# Patient Record
Sex: FEMALE | Race: WHITE | NOT HISPANIC OR LATINO | URBAN - METROPOLITAN AREA
[De-identification: names, ages, dates, MRNs, and addresses within clinical notes are randomized per-mention and may not be internally consistent; named-entity substitution may affect disease eponyms.]

---

## 2016-12-31 NOTE — ED PROVIDER NOTE - ALLERGIC/IMMUNOLOGIC NEGATIVE STATEMENT, MLM
no dermatitis, no environmental allergies, no food allergies, no immunosuppressive disorder, and no pruritus.

## 2016-12-31 NOTE — ED PROVIDER NOTE - SECONDARY DIAGNOSIS.
Aspiration pneumonia of both lower lobes, unspecified aspiration pneumonia type Respiratory distress, acute

## 2016-12-31 NOTE — ED PROVIDER NOTE - CHPI ED SYMPTOMS NEG
no chills/no cough/no chest discomfort/no fever/no chest pain/no chest tightness/no diaphoresis/no vomiting/no dizziness

## 2016-12-31 NOTE — ED PROVIDER NOTE - CRITICAL CARE PROVIDED
interpretation of diagnostic studies/consultation with other physicians/direct patient care (not related to procedure)

## 2016-12-31 NOTE — ED PROVIDER NOTE - PMH
Acute congestive heart failure, unspecified congestive heart failure type    BETSY (acute kidney injury)    Coronary bypass graft mechanical complication    Gastroesophageal reflux disease without esophagitis    Hypertension    Insomnia, unspecified type    ST elevation myocardial infarction (STEMI), unspecified artery

## 2016-12-31 NOTE — ED PROVIDER NOTE - NOTES
Case d/w Dr. Conner pt to be admitted to the hospitaist service (Dr. Shine no longer admits this patient)

## 2016-12-31 NOTE — ED ADULT NURSE NOTE - OBJECTIVE STATEMENT
81 yo female BIBA c/o respiratory distress, on oxygen, ems reports pmh caro, md aware. IV RAC #20g.respiratory at bedside and Bipap

## 2016-12-31 NOTE — ED PROVIDER NOTE - MEDICAL DECISION MAKING DETAILS
81yo female with respiratory distress; elevated BP , and hypoxia; placed on BIPAP and tridil drip for APE/decomp CHF; found to have +infiltrates on CXR; will add antibiotics and blood c/s; admission will be required

## 2016-12-31 NOTE — ED PROVIDER NOTE - INTERPRETATION
normal sinus rhythm, Normal axis, Normal CT interval and QRS complex. There are no acute ischemic ST or T-wave changes. normal sinus rhythm

## 2016-12-31 NOTE — ED ADULT NURSE REASSESSMENT NOTE - NS ED NURSE REASSESS COMMENT FT1
pt VS STABLE AFEBRILE CALL PATTERSON IN REACH, SAFETY MAINTAINED PT PLACED ON TELE , pt resting comfortably, denies pain, except for slight ha, pt removed from bipap by rt, pulse ox 95-96% on 3L /nc.resp 20, pt admitted and awaiting a bed assignment.pt aware.

## 2016-12-31 NOTE — ED PROVIDER NOTE - CARE PLAN
Principal Discharge DX:	Acute on chronic congestive heart failure, unspecified congestive heart failure type  Secondary Diagnosis:	Aspiration pneumonia of both lower lobes, unspecified aspiration pneumonia type  Secondary Diagnosis:	Respiratory distress, acute

## 2017-01-01 NOTE — CONSULT NOTE ADULT - SUBJECTIVE AND OBJECTIVE BOX
Chief Complaint:  Patient is a 82y old  Female who presents with a chief complaint of SOB    HPI: Mirtha is a pleasant 82 female with background history of CAD, coronary artery bypass grafting, hypertension, prior heart failure decompensation and lower extremity edema with recent admission for the same improved and was discharged but apparently returned with increasing shortness of breath that was progressively worsening over the last few days. Last evening she had hypertensive urgency as well and was given BiPAP supplemental oxygen, as well as IV nitroglycerin with improvement in her status. She is resting comfortably now and no acute distress with better blood pressures however she is a bit dizzy with background chronic vertigo as well as Gerd symptoms. No current chest pains, PND, palpitations, syncope, lightheadedness, or worsening edema.    Allergies: Talwin    Medications:  Tylenol 325 mg two capsules every six hours as needed PRN. Albuterol nebulizer every four hours as directed, aspirin 81 mg daily, Atorvastatin 20 mg nightly, Colace 100 mg TID, fluoxetine 40 mg daily, meclizine 25 mg TID, Spiriva 18 µg one capsule inhalation daily, zolpidem 5 mg daily as needed for insomnia.    Past medical/surgical history:   CAD, CABG, hypertension, dyslipidemia, chronic vertigo, Gerd, constipation, insomnia.     Family history: Noncontributory to the current situation    Social history: current non-smoker    Review of Systems:    General:  No wt loss, fevers, chills, night sweats  Eyes:  Good vision, no reported pain  ENT:  No sore throat, pain, runny nose, dysphagia  CV:  No pain, palpitations hypo/hypertension  Resp:  No dyspnea, cough, tachypnea, wheezing  GI:  No pain, nausea, vomiting, diarrhea, constipation  :  No pain, bleeding, incontinence, nocturia  Muscle:  No pain, weakness  Breast:  No pain, abscess, mass, discharge  Neuro:  No weakness, tingling, memory problems  Psych:  No fatigue, insomnia, mood problems, depression  Endocrine:  No polyuria, polydipsia cold/heat intolerance  Heme:  No petechiae, ecchymosis, easy bruisability  Skin:  No rash, scars, edema      Physical Exam:      Vital Signs:  Vital Signs Last 24 Hrs  T(C): 36.3, Max: 36.7 (01-01 @ 00:45)  T(F): 97.4, Max: 98 (01-01 @ 00:45)  HR: 78 (68 - 115)  BP: 178/74 (135/77 - 257/137)  RR: 18 (14 - 26)  SpO2: 95% (95% - 100%)  Daily Height in cm: 152.4 (01 Jan 2017 00:45)        General:  Appears stated age, well-groomed, well-nourished, no distress  HEENT:  NC/AT, patent nares w/ pink mucosa, OP clear w/o lesions,EOMI, conjunctivae clear, no thyromegaly, nodules, adenopathy, no JVD  Chest:  Full & symmetric excursion, no increased effort, breath sounds clear  Cardiovascular:  Regular rhythm, S1, S2, no murmur/rub/S3/S4, no carotid bruit, radial/pedal pulses 2+, no edema  Abdomen:  Soft, non-tender, non-distended, normoactive bowel sounds, no HSM  Extremities: No c/c/edema.  Skin:  No rash/erythema. Skin is warm/dry  Musculoskeletal:  Full ROM in all joints w/o swelling/tenderness/effusion  Neuro/Psych:  Alert, oriented    Laboratory:                            13.9   13.0  )-----------( 337      ( 31 Dec 2016 15:30 )             39.0     31 Dec 2016 15:30    139    |  103    |  18     ----------------------------<  150    4.6     |  25     |  0.78     Ca    9.1        31 Dec 2016 15:30    TPro  8.3    /  Alb  4.2    /  TBili  0.5    /  DBili  x      /  AST  19     /  ALT  17     /  AlkPhos  80     31 Dec 2016 15:30    ABG - ( 31 Dec 2016 16:46 )  pH: x     /  pCO2: 43    /  pO2: 340   / HCO3: 24    / Base Excess: -0.5  /  SaO2: 100         CARDIAC MARKERS ( 31 Dec 2016 15:30 )  <.015 ng/mL / x     / 47 U/L / x     / 0.6 ng/mL  BNP 5779    CAPILLARY BLOOD GLUCOSE    LIVER FUNCTIONS - ( 31 Dec 2016 15:30 )  Alb: 4.2 g/dL / Pro: 8.3 gm/dL / ALK PHOS: 80 U/L / ALT: 17 U/L / AST: 19 U/L / GGT: x           PT/INR - ( 31 Dec 2016 15:30 )   PT: 11.1 sec;   INR: 0.99 ratio         PTT - ( 31 Dec 2016 15:30 )  PTT:41.5 sec      Imaging:  CXR:   IMPRESSION: Worsening bibasilar patchy opacities compatible with   pneumonia since 11/16/2016. Small left pleural effusion.    5/2016 echo:     1. Left ventricular ejection fraction, by visual estimation, is 55 to 60%.   2. Moderate mitral annular calcification.   3. Mild aortic regurgitation.   4. Estimated pulmonary artery systolic pressure is 40.3 mmHg assuming a   right atrial pressure of 5 mmHg, which is consistent with mild pulmonary   hypertension.    Assessment:Mirtha is a pleasant 82 female with background history of CAD, coronary artery bypass grafting, hypertension, prior heart failure decompensation and lower extremity edema with recent admission for the same improved and was discharged but apparently returned with increasing shortness of breath that was progressively worsening over the last few days. Last evening she had hypertensive urgency as well and was given BiPAP supplemental oxygen, as well as IV nitroglycerin with improvement in her status. She is resting comfortably now and no acute distress with better blood pressures however she is a bit dizzy with background chronic vertigo as well as Gerd symptoms. No current chest pains, PND, palpitations, syncope, lightheadedness, or worsening edema. Possible PNA and acute on chronic diastolic HF decompensation.      Plan: con't supportive care, abx, fluid/salt restrict. Diuretic prn.  Con't meds:  heparin  Injectable 5000Unit(s) SubCutaneous every 12 hours  lisinopril 20milliGRAM(s) Oral daily  metoprolol 50milliGRAM(s) Oral every 12 hours  meclizine 25milliGRAM(s) Oral three times a day  ALBUTerol    90 MICROgram(s) HFA Inhaler 1Puff(s) Inhalation every 4 hours  tiotropium 18 MICROgram(s) Capsule 1Capsule(s) Inhalation daily  pantoprazole    Tablet 40milliGRAM(s) Oral before breakfast  aspirin enteric coated 81milliGRAM(s) Oral daily  FLUoxetine 40milliGRAM(s) Oral daily  atorvastatin 20milliGRAM(s) Oral at bedtime  docusate sodium 100milliGRAM(s) Oral three times a day    MEDICATIONS  (PRN):  guaiFENesin    Syrup 100milliGRAM(s) Oral every 6 hours PRN Cough  benzocaine 15 mG/menthol 3.6 mG Lozenge 1Lozenge Oral every 3 hours PRN Sore Throat           Kevin Moulton MD, FACC, YOLIS SEVILLA, FACP  Director, Heart Failure Services  [facility]  , Department of Cardiology  [facility]

## 2017-01-02 NOTE — PROGRESS NOTE ADULT - SUBJECTIVE AND OBJECTIVE BOX
Chief Complaint:  Patient is a 82y old  Female who presents with a chief complaint of SOB    HPI: Mirtha is a pleasant 82 female with background history of CAD, coronary artery bypass grafting, hypertension, prior heart failure decompensation and lower extremity edema with recent admission for the same improved and was discharged but apparently returned with increasing shortness of breath that was progressively worsening over the last few days. Last evening she had hypertensive urgency as well and was given BiPAP supplemental oxygen, as well as IV nitroglycerin with improvement in her status. She is resting comfortably now and no acute distress with better blood pressures however she is a bit dizzy with background chronic vertigo as well as Gerd symptoms. No current chest pains, PND, palpitations, syncope, lightheadedness, or worsening edema.    Medications:  Tylenol 325 mg two capsules every six hours as needed PRN. Albuterol nebulizer every four hours as directed, aspirin 81 mg daily, Atorvastatin 20 mg nightly, Colace 100 mg TID, fluoxetine 40 mg daily, meclizine 25 mg TID, Spiriva 18 µg one capsule inhalation daily, zolpidem 5 mg daily as needed for insomnia.      Review of Systems:    General:  No wt loss, fevers, chills, night sweats  Eyes:  Good vision, no reported pain  ENT:  No sore throat, pain, runny nose, dysphagia  CV:  No pain, palpitations hypo/hypertension  Resp:  No dyspnea, cough, tachypnea, wheezing  GI:  No pain, nausea, vomiting, diarrhea, constipation  :  No pain, bleeding, incontinence, nocturia  Muscle:  No pain, weakness  Breast:  No pain, abscess, mass, discharge  Neuro:  No weakness, tingling, memory problems  Psych:  No fatigue, insomnia, mood problems, depression  Endocrine:  No polyuria, polydipsia cold/heat intolerance  Heme:  No petechiae, ecchymosis, easy bruisability  Skin:  No rash, scars, edema      Physical Exam:    Vital Signs Last 24 Hrs  T(C): 36.9, Max: 36.9 (01-02 @ 11:42)  T(F): 98.4, Max: 98.4 (01-02 @ 11:42)  HR: 66 (63 - 75)  BP: 159/89 (159/89 - 185/66)  RR: 18 (18 - 20)  SpO2: 97% (95% - 99%)      General:  Appears stated age, well-groomed, well-nourished, no distress  HEENT:  NC/AT, patent nares w/ pink mucosa, OP clear w/o lesions,EOMI, conjunctivae clear, no thyromegaly, nodules, adenopathy, no JVD  Chest:  Full & symmetric excursion, no increased effort, breath sounds clear  Cardiovascular:  Regular rhythm, S1, S2, no murmur/rub/S3/S4, no carotid bruit, radial/pedal pulses 2+, no edema  Abdomen:  Soft, non-tender, non-distended, normoactive bowel sounds, no HSM  Extremities: No c/c/edema.  Skin:  No rash/erythema. Skin is warm/dry  Musculoskeletal:  Full ROM in all joints w/o swelling/tenderness/effusion  Neuro/Psych:  Alert, oriented    Laboratory:    No new labs today.    Imaging:  CXR:   IMPRESSION: Worsening bibasilar patchy opacities compatible with   pneumonia since 11/16/2016. Small left pleural effusion.    5/2016 echo:     1. Left ventricular ejection fraction, by visual estimation, is 55 to 60%.   2. Moderate mitral annular calcification.   3. Mild aortic regurgitation.   4. Estimated pulmonary artery systolic pressure is 40.3 mmHg assuming a   right atrial pressure of 5 mmHg, which is consistent with mild pulmonary   hypertension.    Assessment:Mirtha is a pleasant 82 female with background history of CAD, coronary artery bypass grafting, hypertension, prior heart failure decompensation and lower extremity edema with recent admission for the same improved and was discharged but apparently returned with increasing shortness of breath that was progressively worsening over the last few days. Last evening she had hypertensive urgency as well and was given BiPAP supplemental oxygen, as well as IV nitroglycerin with improvement in her status. She is resting comfortably now and no acute distress with better blood pressures however she is a bit dizzy with background chronic vertigo as well as Gerd symptoms. No current chest pains, PND, palpitations, syncope, lightheadedness, or worsening edema. Possible PNA and acute on chronic diastolic HF decompensation.      Plan: con't supportive care, abx, fluid/salt restrict. Diuretic prn.  BP management, DVT protection, lipid lowering.    heparin  Injectable 5000Unit(s) SubCutaneous every 12 hours  lisinopril 20milliGRAM(s) Oral daily  metoprolol 50milliGRAM(s) Oral every 12 hours  meclizine 25milliGRAM(s) Oral three times a day  ALBUTerol    90 MICROgram(s) HFA Inhaler 1Puff(s) Inhalation every 4 hours  tiotropium 18 MICROgram(s) Capsule 1Capsule(s) Inhalation daily  pantoprazole    Tablet 40milliGRAM(s) Oral before breakfast  aspirin enteric coated 81milliGRAM(s) Oral daily  FLUoxetine 40milliGRAM(s) Oral daily  atorvastatin 20milliGRAM(s) Oral at bedtime  docusate sodium 100milliGRAM(s) Oral three times a day    MEDICATIONS  (PRN):  guaiFENesin    Syrup 100milliGRAM(s) Oral every 6 hours PRN Cough  benzocaine 15 mG/menthol 3.6 mG Lozenge 1Lozenge Oral every 3 hours PRN Sore Throat  sodium chloride 0.65% Nasal 1Spray(s) Both Nostrils two times a day PRN Nasal Congestion             Kevin Moulton MD, FACC, FASSWATI, FASNC, FACP  Director, Heart Failure Services  [facility]  , Department of Cardiology  [facility]

## 2017-01-02 NOTE — DIETITIAN INITIAL EVALUATION ADULT. - ENERGY NEEDS
Height (cm): 152.4 (01-01)  Weight (kg): 62.2 (01-01)  BMI (kg/m2): 26.8 (01-01)  IBW: 100 lbs/ 45.3 kg % IBW: 152% UBW: 134lbs/ 60.7 kg %UBW: 113%, ? adm wt. for accuracy, pt. appears WNL of IBW/ DBW

## 2017-01-02 NOTE — DIETITIAN INITIAL EVALUATION ADULT. - NS AS NUTRI INTERV ED CONTENT
Educate on Dysphagia 3 Soft - Thin Liquids @ present & recommendation for swallow evaluation to assess appropriate consistency of diet/Purpose of the nutrition education

## 2017-01-02 NOTE — DIETITIAN INITIAL EVALUATION ADULT. - FACTORS AFF FOOD INTAKE
difficulty chewing/difficulty swallowing/pt. reports poor teeth condition, missing teeth, broken bridge/loose tooth,  coughing c food intake which is alleviated by drinking water, 1/1. dysphagia screen ordered

## 2017-01-02 NOTE — DIETITIAN INITIAL EVALUATION ADULT. - OTHER INFO
Pt. was seen due to dx of CHF.  Pt. stated that she has not been eating right during the Holidays & was consuming salty foods ie, cheese, pepperoni & canned soups.  Pt. reports hx of lactose intolerance c symptoms of gas & vomiting.  Pt. was using lactacid milk @ home.  Pt. reports that she consumed 1/2 of breakfast this AM, able to eat soft, moist foods.

## 2017-01-02 NOTE — DIETITIAN INITIAL EVALUATION ADULT. - NS AS NUTRI INTERV MEALS SNACK
Texture-modified diet/Recommend Dysphagia 3 Soft - Thin Liquids, Low sodium diet/Mineral - modified diet

## 2017-01-02 NOTE — DIETITIAN INITIAL EVALUATION ADULT. - NUTRITION INTERVENTION
Meals and Snack/Nutrition Education/Collaboration and Referral of Nutrition Care Nutrition Education

## 2017-01-02 NOTE — DIETITIAN INITIAL EVALUATION ADULT. - PHYSICAL APPEARANCE
other (specify)/BMI=26.8, overweight 1/1/17)? for accuracy, pt. does  not appear overweight,  pt. reports last weight @ home was 134 lbs 3 weeks ago.

## 2017-01-03 NOTE — PROGRESS NOTE ADULT - ASSESSMENT
81yo female with background history of CAD, coronary artery bypass grafting, hypertension, prior heart failure decompensation and lower extremity edema with recent admission for the same improved and was discharged but apparently returned with increasing shortness of breath that was progressively worsening over the last few days. Admitted with hypertensive urgency and patient remains hypertensive. She is resting comfortably now and no acute distress however she is a bit dizzy with background chronic vertigo as well as Gerd symptoms. Possible PNA and acute on chronic diastolic HF decompensation.

## 2017-01-03 NOTE — PROGRESS NOTE ADULT - SUBJECTIVE AND OBJECTIVE BOX
83yo female who presents with a chief complaint of SOB.    HPI: Known history of CAD, coronary artery bypass grafting, hypertension, prior heart failure decompensation and lower extremity edema with recent admission for the same, improved and was discharged but apparently returned with increasing shortness of breath over the last few days. Patient also has complaints of headaches and chronic dizziness which she attributes to vertigo. She was noted to have hypertensive urgency as well She is resting comfortably now and no acute distress with better blood pressures. She still complains of significant dizziness and is most disturbed over the fact that she did not receive her sleeping pill last night in a timely fashion. No current chest pains, PND, palpitations, syncope, lightheadedness, or worsening edema.    Allergies: Talwin    Medications: Tylenol 325 mg two capsules every six hours as needed PRN. Albuterol nebulizer every four hours as directed, aspirin 81 mg daily, Atorvastatin 20 mg nightly, Colace 100 mg TID, fluoxetine 40 mg daily, meclizine 25 mg TID, Spiriva 18 µg one capsule inhalation daily, zolpidem 5 mg daily as needed for insomnia.  Past medical/surgical history: CABG, hypertension, dyslipidemia, chronic vertigo, Gerd, constipation, insomnia.   Family history: Noncontributory to the current situation  Social history: current non-smoker    Review of Systems:    General:  No wt loss, fevers, chills, night sweats  Eyes:  Good vision, no reported pain  ENT:  No sore throat, pain, runny nose, dysphagia  CV:  No pain, palpitations hypo/hypertension  Resp:  No dyspnea, cough, tachypnea, wheezing  GI:  No pain, nausea, vomiting, diarrhea, constipation  :  No pain, bleeding, incontinence, nocturia  Muscle:  No pain, weakness  Breast:  No pain, abscess, mass, discharge  Neuro:  Migraine headaches and dizziness  Psych:  Anxious. Complains of insomnia.  Endocrine:  No polyuria, polydipsia cold/heat intolerance  Heme:  No petechiae, ecchymosis, easy bruisability  Skin:  No rash, scars, edema    PHYSICAL EXAMINATION:  -----------------------------  T(C): 36.6, Max: 36.9 (- @ 11:42)  HR: 59 (59 - 66)  BP: 181/73 (159/89 - 192/73)  RR: 18 (17 - 18)  SpO2: 97% (97% - 98%)  Wt(kg): --    I & Os for current day (as of  @ 10:05)  =============================================  IN:    Oral Fluid: 120 ml    Total IN: 120 ml  ---------------------------------------------  OUT:    Total OUT: 0 ml  ---------------------------------------------  Total NET: 120 ml        Constitutional: well developed, normal appearance, well groomed, well nourished, no deformities and no acute distress.   Eyes: the conjunctiva exhibited no abnormalities and the eyelids demonstrated no xanthelasmas.   HEENT: normal oral mucosa, no oral pallor and no oral cyanosis.   Neck: normal jugular venous A waves present, normal jugular venous V waves present and no jugular venous martinez A waves.   Pulmonary: no respiratory distress, normal respiratory rhythm and effort, no accessory muscle use and lungs were clear to auscultation bilaterally.   Cardiovascular: heart rate and rhythm were normal, normal S1 and S2 and no murmur, gallop, rub, heave or thrill are present.   Abdomen: soft, non-tender, no hepato-splenomegaly and no abdominal mass palpated.   Musculoskeletal: the gait could not be assessed..   Extremities: no clubbing of the fingernails, no localized cyanosis, no petechial hemorrhages and no ischemic changes.   Skin: normal skin color and pigmentation, no rash, no venous stasis, no skin lesions, no skin ulcer and no xanthoma was observed.   Psychiatric: oriented to person, place, and time, the affect was normal, the mood was normal and not feeling anxious.     LABS:   --------  2017 07:21    136    |  97     |  14     ----------------------------<  96     3.6     |  30     |  0.77     Ca    8.8        2017 07:21    TPro  6.6    /  Alb  3.4    /  TBili  0.4    /  DBili  x      /  AST  18     /  ALT  15     /  AlkPhos  61     2017 07:21                         11.4   6.4   )-----------( 230      ( 2017 07:21 )             32.8                 Imaging:    EXAM:  CHEST SINGLE VIEW                            PROCEDURE DATE:  2016        INTERPRETATION:  Single AP view of the chest.    CLINICAL INFORMATION: Chest pain, shortness of breath    FINDINGS:  There is a small left pleural effusion as well as bibasilar   patchy opacities. Heart size cannot be accurately evaluated in this   projection. The patient is status post sternotomy. 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    IMPRESSION: Worsening bibasilar patchy opacities compatible with   pneumonia since 2016. Small left pleural effusion.             EXAM:  TTE WO CON COMPLETE W DOPPL      PROCEDURE DATE:  May 27 2016   .      INTERPRETATION:  Sonographer:  INDIA     Indications: I50.33 Acute on chronic diastolic (congestive) heart failure  Diagnosis:   CHF           2D AND M-MODE MEASUREMENTS (normal ranges within parentheses):  Left Ventricle:                 Normal    Aorta/Left Atrium:           Normal  IVSd (2D):              1.21 cm (0.7-1.1) Aortic Root (2D):  2.99 cm   (2.4-3.7)  LVPWd (2D):             1.10 cm (0.7-1.1) Left Atrium (2D):  3.01 cm   (1.9-4.0)  LVIDd (2D):             4.47 cm (3.4-5.7) Right Ventricle:  LVIDs (2D):             2.74 cm           TAPSE:           1.53 cm  LV FS (2D):             38.8 %  (>25%)  Relative Wall Thickness 0.49    (<0.42)     LV DIASTOLICFUNCTION:  MV Peak E: 1.09 m/s Decel Time: 235 msec  MV Peak A: 0.87 m/s  E/A Ratio: 1.25     SPECTRAL DOPPLER ANALYSIS (where applicable):  Mitral Valve:  MV P1/2 Time: 68.23 msec  MV Area, PHT: 3.22 cm²     Aortic Valve: AoV Max Mina: 1.63 m/s AoV Peak PG: 10.6 mmHg AoV Mean P.9 mmHg     LVOT Vmax: 1.17 m/s LVOT VTI: 0.272 m LVOT Diameter:     Aortic Insufficiency:  AI Half-time:  670 msec  AI Decel Rate: 1.46 m/s²     Tricuspid Valve and PA/RV Systolic Pressure: TR Max Velocity: 2.97 m/s   RA Pressure: 5 mmHg RVSP/PASP: 40.3 mmHg        PHYSICIAN INTERPRETATION:  Left Ventricle: Normal left ventricular size and wall thicknesses, with   normal systolic and diastolic function.  Left ventricular ejection fraction, by visual estimation, is 55 to 60%.  Right Ventricle: Normal right ventricular size and function.  Left Atrium: The left atrium is normal in size. Normal left atrial size.  Right Atrium: The right atrium is normal in size.  Pericardium: There is no evidence of pericardial effusion.  Mitral Valve: Structurally normal mitral valve, with normal leaflet   excursion. There is moderate mitral annular calcification. Mild mitral   valve regurgitation is seen.  Tricuspid Valve: Structurally normal tricuspid valve, with normal leaflet   excursion. The tricuspid valve is normal in structure. Mild tricuspid   regurgitation is visualized. Estimated pulmonary artery systolic pressure   is 40.3 mmHg assuming a right atrial pressure of 5 mmHg, which is   consistent with mild pulmonary hypertension.  Aortic Valve: Normal trileaflet aortic valve with normal opening. Mild   aortic valve regurgitation is seen.  Pulmonic Valve: Structurally normal pulmonic valve, with normal leaflet   excursion.  Aorta: The aortic root and ascendingaorta are structurally normal, with   no evidence of dilitation.  Pulmonary Artery: The main pulmonary artery is normal in size.     IMPRESSION:  Summary:   1. Left ventricular ejection fraction, by visual estimation, is 55 to   60%.   2. Moderate mitral annular calcification.   3. Mild aortic regurgitation.   4. Estimated pulmonary artery systolic pressure is 40.3 mmHg assuming a   right atrial pressure of 5 mmHg, which is consistent with mild pulmonary   hypertension.

## 2017-01-03 NOTE — CONSULT NOTE ADULT - SUBJECTIVE AND OBJECTIVE BOX
Infectious Diseases - Attending at Dr. Issa    HPI:  76 y/o M with PMH of CHF, h/o cardiac arrythmia VT, s/p AICD, h/o stroke on aggrenox, HTN, TRISTA on CPAP at night and DM2 p/w c/o worsening sob, retrosternal CP since 7 PM evening before admission. She was experiencing worsening cough (dry ) and shortness of breath since 5 days prior to admission and thought she was over exhausting herself over holidays.She denies any fever, chills etc  Patient also gives h/o worsening leg edema for last few days.  In ED he received IV lasix and morphine, now CP is resolved and SOB improved. Lying comfortably on bed and saturating well on nasal cannula.  patient states to be complaint to his medications , but drinks plenty of water daily.  Her symptoms have been improving in the hospital.  She had leukocytosis in the hospital which has resolved. She received a dose of vanco and zosyn in the hospital on admission.      PAST MEDICAL & SURGICAL HISTORY:  Coronary bypass graft mechanical complication  ST elevation myocardial infarction (STEMI), unspecified artery  BETSY (acute kidney injury)  Insomnia, unspecified type  Gastroesophageal reflux disease without esophagitis  Acute congestive heart failure, unspecified congestive heart failure type  Hypertension  S/P CABG x 1: 9 days ago  S/P TKR (total knee replacement) using cement, bilateral      Allergies    Talwin (Nausea)    Intolerances    lactose (Headache; Vomiting)  Milk (Vomiting)      FAMILY HISTORY:  No pertinent family history in first degree relatives      SOCIAL HISTORY:    REVIEW OF SYSTEMS:    Constitutional: No fever, weight loss or fatigue  Eyes: No eye pain, visual disturbances, or discharge  ENT: headache, also has history of migraine  Neck: No pain or stiffness  Breasts: No pain, masses or nipple discharge  Respiratory: cough with shortness of breath and chest pain  Cardiovascular: No chest pain, palpitations, shortness of breath, dizziness or leg swelling  Gastrointestinal: No abdominal or epigastric pain. No nausea, vomiting or hematemesis; No diarrhea or constipation. No melena or hematochezia.  Genitourinary: No dysuria, frequency, hematuria or incontinence  Rectal: No pain, hemorrhoids or incontinence  Neurological: No headaches, memory loss, loss of strength, numbness or tremors  Skin: No itching, burning, rashes or lesions   Lymph Nodes: No enlarged glands  Endocrine: No heat or cold intolerance; No hair loss  Musculoskeletal: No joint pain or swelling; No muscle, back or extremity pain  Psychiatric: No depression, anxiety, mood swings or difficulty sleeping  Heme/Lymph: No easy bruising or bleeding gums  Allergy and Immunologic: No hives or eczema    MEDICATIONS  (STANDING):  heparin  Injectable 5000Unit(s) SubCutaneous every 12 hours  metoprolol 50milliGRAM(s) Oral every 12 hours  meclizine 25milliGRAM(s) Oral three times a day  ALBUTerol    90 MICROgram(s) HFA Inhaler 1Puff(s) Inhalation every 4 hours  tiotropium 18 MICROgram(s) Capsule 1Capsule(s) Inhalation daily  pantoprazole    Tablet 40milliGRAM(s) Oral before breakfast  aspirin enteric coated 81milliGRAM(s) Oral daily  FLUoxetine 40milliGRAM(s) Oral daily  atorvastatin 20milliGRAM(s) Oral at bedtime  docusate sodium 100milliGRAM(s) Oral three times a day  lisinopril 40milliGRAM(s) Oral daily  furosemide   Injectable 40milliGRAM(s) IV Push every 12 hours    MEDICATIONS  (PRN):  guaiFENesin    Syrup 100milliGRAM(s) Oral every 6 hours PRN Cough  benzocaine 15 mG/menthol 3.6 mG Lozenge 1Lozenge Oral every 3 hours PRN Sore Throat  sodium chloride 0.65% Nasal 1Spray(s) Both Nostrils two times a day PRN Nasal Congestion  acetaminophen   Tablet. 650milliGRAM(s) Oral every 6 hours PRN Moderate Pain (4 - 6)  melatonin 3milliGRAM(s) Oral at bedtime PRN Insomnia  zolpidem 5milliGRAM(s) Oral at bedtime PRN Insomnia  benzonatate 100milliGRAM(s) Oral three times a day PRN Cough      Vital Signs Last 24 Hrs  T(C): 36.2, Max: 36.9 (01-02 @ 23:49)  T(F): 97.2, Max: 98.4 (01-02 @ 23:49)  HR: 55 (55 - 65)  BP: 163/59 (163/59 - 192/73)  BP(mean): --  RR: 16 (16 - 18)  SpO2: 96% (96% - 98%)    PHYSICAL EXAM:    Constitutional: NAD, well-groomed, well-developed  HEENT: PERRLA, EOMI, Normal Hearing, MMM  Neck: No LAD, No JVD  Respiratory: decreased air entry in all lung fields.  Cardiovascular: S1 and S2, RRR, no M/G/R  Gastrointestinal: BS+, soft, NT/ND  Extremities: No peripheral edema  Vascular: 2+ peripheral pulses  Neurological: A/O x 3, no focal deficits  Psychiatric: Normal mood, normal affect  Musculoskeletal: 5/5 strength b/l upper and lower extremities  Skin: No rashes      LABS:                        11.4   6.4   )-----------( 230      ( 03 Jan 2017 07:21 )             32.8     03 Jan 2017 07:21    136    |  97     |  14     ----------------------------<  96     3.6     |  30     |  0.77     Ca    8.8        03 Jan 2017 07:21    TPro  6.6    /  Alb  3.4    /  TBili  0.4    /  DBili  x      /  AST  18     /  ALT  15     /  AlkPhos  61     03 Jan 2017 07:21          MICROBIOLOGY:      RADIOLOGY & ADDITIONAL STUDIES:

## 2017-01-03 NOTE — CONSULT NOTE ADULT - PROBLEM SELECTOR RECOMMENDATION 2
on lasix,cxr changes not much changed then last admission and Pr BNP elevated with leg swelling on presentation.  cardio on board

## 2017-01-03 NOTE — H&P ADULT. - HISTORY OF PRESENT ILLNESS
HPI written today because admitting md did not write and history of present illness and was not Follow up by hospitalist since admission   76 y/o M with PMH of CHF, h/o cardiac arrythmia VT, s/p AICD, h/o stroke on aggrenox, HTN, TRISTA on CPAP at night and DM2 p/w c/o worsening sob, retrosternal CP since 7 PM last evening.  Patient also gives h/o worsening leg edema for last few days, he denies cough, fever, chills, orthopnea, perspiration, nausea, vomiting.  In ED he received IV lasix and morphine, now CP is resolved and SOB improved. lying comfortably on bed and saturating well on nasal cannula.  patient states to be complaint to his medications , but drinks plenty of water daily.  patient still uses bipap

## 2017-01-03 NOTE — CONSULT NOTE ADULT - PROBLEM SELECTOR RECOMMENDATION 9
symptoms imrpoving  At this point I really doubt pneumonia. Monitoring off antibiotics  check PCT level  Pennysealejo parker for cough PRN

## 2017-01-04 NOTE — PHYSICAL THERAPY INITIAL EVALUATION ADULT - MODIFIED CLINICAL TEST OF SENSORY INTEGRATION IN BALANCE TEST
Barthel Index: Feeding Score _10__, Bathing Score __5_, Grooming Score _5__, Dressing Score ___10, Bowels Score _10__, Bladder Score __10_, Toilet Score _10__, Transfers Score __15_, Mobility Score _15__, Stairs Score __0_,     Total Score __90_

## 2017-01-04 NOTE — PROGRESS NOTE ADULT - SUBJECTIVE AND OBJECTIVE BOX
Patient is a 82y old  Female who presents with a chief complaint of short of breath (03 Jan 2017 11:40)  HPI: Known history of CAD, coronary artery bypass grafting, hypertension, prior heart failure decompensation and lower extremity edema with recent admission for the same, improved and was discharged but apparently returned with increasing shortness of breath over the last few days. Patient also has complaints of headaches and chronic dizziness which she attributes to vertigo. She was noted to have hypertensive urgency as well She is resting comfortably now and no acute distress with better blood pressures. She still complains of significant dizziness and is most disturbed over the fact that she did not receive her sleeping pill last night in a timely fashion. No current chest pains, PND, palpitations, syncope, lightheadedness, or worsening edema      PAST MEDICAL & SURGICAL HISTORY:  Coronary bypass graft mechanical complication  ST elevation myocardial infarction (STEMI), unspecified artery  BETSY (acute kidney injury)  Insomnia, unspecified type  Gastroesophageal reflux disease without esophagitis  Acute congestive heart failure, unspecified congestive heart failure type  Hypertension  S/P CABG x 1: 9 days ago  S/P TKR (total knee replacement) using cement, bilateral      INTERVAL HISTORY: Pt. was able to ambulate length of hallway with physical therapy. O2 Sat after back in bed on room air 93%, mild DILLARD.  Mostly concerned about insomnia.  	  MEDICATIONS:  MEDICATIONS  (STANDING):  heparin  Injectable 5000Unit(s) SubCutaneous every 12 hours  metoprolol 50milliGRAM(s) Oral every 12 hours  meclizine 25milliGRAM(s) Oral three times a day  ALBUTerol    90 MICROgram(s) HFA Inhaler 1Puff(s) Inhalation every 4 hours  tiotropium 18 MICROgram(s) Capsule 1Capsule(s) Inhalation daily  pantoprazole    Tablet 40milliGRAM(s) Oral before breakfast  aspirin enteric coated 81milliGRAM(s) Oral daily  FLUoxetine 40milliGRAM(s) Oral daily  atorvastatin 20milliGRAM(s) Oral at bedtime  docusate sodium 100milliGRAM(s) Oral three times a day  lisinopril 40milliGRAM(s) Oral daily  furosemide   Injectable 40milliGRAM(s) IV Push every 12 hours    MEDICATIONS  (PRN):  guaiFENesin    Syrup 100milliGRAM(s) Oral every 6 hours PRN Cough  benzocaine 15 mG/menthol 3.6 mG Lozenge 1Lozenge Oral every 3 hours PRN Sore Throat  sodium chloride 0.65% Nasal 1Spray(s) Both Nostrils two times a day PRN Nasal Congestion  acetaminophen   Tablet. 650milliGRAM(s) Oral every 6 hours PRN Moderate Pain (4 - 6)  melatonin 3milliGRAM(s) Oral at bedtime PRN Insomnia  zolpidem 5milliGRAM(s) Oral at bedtime PRN Insomnia  benzonatate 100milliGRAM(s) Oral three times a day PRN Cough      Vitals:  T(F): 98.2, Max: 99.8 (01-03 @ 17:05)  HR: 61 (55 - 66)  BP: 190/85 (161/72 - 196/89)  RR: 17 (16 - 17)  SpO2: 100% (94% - 100%)  Wt(kg): --    I & Os for current day (as of 01-04 @ 12:21)  =============================================  IN:    Oral Fluid: 80 ml    Total IN: 80 ml  ---------------------------------------------  OUT:    Total OUT: 0 ml  ---------------------------------------------  Total NET: 80 ml        PHYSICAL EXAM:  Neuro: Awake, responsive  CV: S1 S2 RRR, soft syst murmur  Lungs: CTA anteriorly, dim bibas  GI: Soft, BS +, ND, NT  Extremities: less LE edema, now trace    TELEMETRY: NSR, occ PVC  	    RADIOLOGY:     CHEST SINGLE VIEW                            PROCEDURE DATE:  12/31/2016      IMPRESSION: Worsening bibasilar patchy opacities compatible with   pneumonia since 11/16/2016. Small left pleural effusion.	                      12.7   7.0   )-----------( 231      ( 04 Jan 2017 07:26 )             36.3 ,                       11.4   6.4   )-----------( 230      ( 03 Jan 2017 07:21 )             32.8   04 Jan 2017 07:26    135    |  97     |  16     ----------------------------<  98     3.5     |  30     |  0.72     Ca    9.0        04 Jan 2017 07:26  Mg     1.7       04 Jan 2017 07:26    TPro  6.6    /  Alb  3.4    /  TBili  0.4    /  DBili  x      /  AST  18     /  ALT  15     /  AlkPhos  61     03 Jan 2017 07:21

## 2017-01-04 NOTE — PHYSICAL THERAPY INITIAL EVALUATION ADULT - CRITERIA FOR SKILLED THERAPEUTIC INTERVENTIONS
anticipated discharge recommendation/Home. DC'd from PT secondary to being at baseline and prior level of function.

## 2017-01-04 NOTE — PROGRESS NOTE ADULT - PROBLEM SELECTOR PROBLEM 3
Gastroesophageal reflux disease without esophagitis
Respiratory distress, acute
Respiratory distress, acute

## 2017-01-04 NOTE — PROGRESS NOTE ADULT - PROBLEM SELECTOR PLAN 4
continue current meds
Continue zolpidem as needed and consider anti-anxiolytics as well.
Continue zolpidem as needed and consider anti-anxiolytics as well.

## 2017-01-04 NOTE — PHYSICAL THERAPY INITIAL EVALUATION ADULT - GENERAL OBSERVATIONS, REHAB EVAL
Pt seen supine in bed, alert and Ox4, NAD, nasal cannula intact, cardiac monitor intact. Pt /85, HR 60, O2 99% on 3L.

## 2017-01-04 NOTE — PROGRESS NOTE ADULT - ASSESSMENT
81yo female with background history of CAD, coronary artery bypass grafting, hypertension, prior heart failure decompensation and lower extremity edema with recent admission for the same improved and was discharged but apparently returned with increasing shortness of breath that was progressively worsening over the last few days. Admitted with hypertensive urgency and patient remains hypertensive. Possible PNA and acute on chronic diastolic HF decompensation. She is diuresing well. Now off ABX, afebrile.

## 2017-01-04 NOTE — PROGRESS NOTE ADULT - SUBJECTIVE AND OBJECTIVE BOX
Patient is a 82y old  Female who presents with a chief complaint of short of breath (03 Jan 2017 11:40)      INTERVAL HPI / OVERNIGHT EVENTS: pt c/o palpitations and on and off dyspnea for a long time. She is concerned if she has lupus which was told to her by a physician 3-4 yrs ago. Her cough is better today and c/o of only     MEDICATIONS  (STANDING):  heparin  Injectable 5000Unit(s) Sub Cutaneous every 12 hours.  metoprolol 50milliGRAM(s) Oral every 12 hours  meclizine 25milliGRAM(s) Oral three times a day  ALBUTerol    90 MICROgram(s) HFA Inhaler 1Puff(s) Inhalation every 4 hours  tiotropium 18 MICROgram(s) Capsule 1Capsule(s) Inhalation daily  pantoprazole    Tablet 40milliGRAM(s) Oral before breakfast  aspirin enteric coated 81milliGRAM(s) Oral daily  FLUoxetine 40milliGRAM(s) Oral daily  atorvastatin 20milliGRAM(s) Oral at bedtime  docusate sodium 100milliGRAM(s) Oral three times a day  lisinopril 40milliGRAM(s) Oral daily  furosemide   Injectable 40milliGRAM(s) IV Push every 12 hours    MEDICATIONS  (PRN):  guaiFENesin    Syrup 100milliGRAM(s) Oral every 6 hours PRN Cough  benzocaine 15 mG/menthol 3.6 mG Lozenge 1Lozenge Oral every 3 hours PRN Sore Throat  sodium chloride 0.65% Nasal 1Spray(s) Both Nostrils two times a day PRN Nasal Congestion  acetaminophen   Tablet. 650milliGRAM(s) Oral every 6 hours PRN Moderate Pain (4 - 6)  melatonin 3milliGRAM(s) Oral at bedtime PRN Insomnia  zolpidem 5milliGRAM(s) Oral at bedtime PRN Insomnia  benzonatate 100milliGRAM(s) Oral three times a day PRN Cough      Vital Signs Last 24 Hrs  T(C): 36.8, Max: 37.7 (01-03 @ 17:05)  T(F): 98.2, Max: 99.8 (01-03 @ 17:05)  HR: 61 (55 - 66)  BP: 190/85 (161/72 - 196/89)  BP(mean): --  RR: 17 (16 - 17)  SpO2: 100% (94% - 100%)    PHYSICAL EXAM:    Constitutional: NAD, well-groomed, well-developed  Respiratory: CTAB/L  Cardiovascular: S1 and S2, RRR, no M/G/R  Gastrointestinal: BS+, soft, NT/ND  Extremities: No peripheral edema  Vascular: 2+ peripheral pulses  Skin: No rashes      LABS:  Procalcitonin <0.05                        12.7   7.0   )-----------( 231      ( 04 Jan 2017 07:26 )             36.3     04 Jan 2017 07:26    135    |  97     |  16     ----------------------------<  98     3.5     |  30     |  0.72     Ca    9.0        04 Jan 2017 07:26  Mg     1.7       04 Jan 2017 07:26    TPro  6.6    /  Alb  3.4    /  TBili  0.4    /  DBili  x      /  AST  18     /  ALT  15     /  AlkPhos  61     03 Jan 2017 07:21            MICROBIOLOGY:    Culture Results -   No growth to date.  Gram Stain - --  Gram Stain Blood - --  Organism - --  Organism Identification - --  Specimen Source - .Blood Blood-Venous            Culture Results -   No growth to date.  Method Type - --  Organism Identification - --  Specimen Source - .Blood Blood-Venous              AFB:        RADIOLOGY & ADDITIONAL STUDIES:

## 2017-01-04 NOTE — PROGRESS NOTE ADULT - PROBLEM SELECTOR PLAN 3
protonix
Possibly due to acute on chronic diastolic heart failure but would consider concurrent respiratory infection as well.
Possibly due to acute on chronic diastolic heart failure but would consider concurrent respiratory infection as well. Now off ABX. Will check RA O2 Sat. DC oxygen if Sat >93%

## 2017-01-04 NOTE — PROGRESS NOTE ADULT - PROBLEM SELECTOR PROBLEM 2
Acute on chronic diastolic (congestive) heart failure
Insomnia, unspecified type
Essential hypertension
Essential hypertension

## 2017-01-04 NOTE — PROGRESS NOTE ADULT - SUBJECTIVE AND OBJECTIVE BOX
CHIEF COMPLAINT/INTERVAL HISTORY:    Patient is a 82y old  Female who presents with a chief complaint of short of breath (03 Jan 2017 11:40)      HPI:  HPI written today because admitting md did not write and history of present illness and was not Follow up by hospitalist since admission   74 y/o M with PMH of CHF, h/o cardiac arrythmia VT, s/p AICD, h/o stroke on aggrenox, HTN, TRISTA on CPAP at night and DM2 p/w c/o worsening sob, retrosternal CP since 7 PM last evening.  Patient also gives h/o worsening leg edema for last few days, he denies cough, fever, chills, orthopnea, perspiration, nausea, vomiting.  In ED he received IV lasix and morphine, now CP is resolved and SOB improved. lying comfortably on bed and saturating well on nasal cannula.  patient states to be complaint to his medications , but drinks plenty of water daily.  patient still uses bipap (03 Jan 2017 11:40)    Overnight issues  still complain of dyspnea upon exertion   switched by cardiology to oral lasix     SUBJECTIVE & OBJECTIVE: Pt seen and examined at bedside.   ROS:  CONSTITUTIONAL: No fever, weight loss, or fatigue  EYES: No eye pain, visual disturbances, or discharge  ENMT:  No difficulty hearing, tinnitus, vertigo; No sinus or throat pain  NECK: No pain or stiffness  RESPIRATORY: No cough, wheezing, chills or hemoptysis; No shortness of breath  CARDIOVASCULAR: No chest pain, palpitations, dizziness, or leg swelling POSITIVE dyspnea upon exertion   GASTROINTESTINAL: No abdominal or epigastric pain. No nausea, vomiting, or hematemesis; No diarrhea or constipation. No melena or hematochezia.  GENITOURINARY: No dysuria, frequency, hematuria, or incontinence  NEUROLOGICAL: No headaches, memory loss, loss of strength, numbness, or tremors  SKIN: No itching, burning, rashes, or lesions   LYMPH NODES: No enlarged glands  ENDOCRINE: No heat or cold intolerance; No hair loss  MUSCULOSKELETAL: No joint pain or swelling; No muscle, back, or extremity pain  PSYCHIATRIC: No depression, anxiety, mood swings, or difficulty sleeping  HEME/LYMPH: No easy bruising, or bleeding gums  ALLERGY AND IMMUNOLOGIC: No hives or eczema  ICU Vital Signs Last 24 Hrs  T(C): 36.6, Max: 37.7 (01-03 @ 17:05)  T(F): 97.8, Max: 99.8 (01-03 @ 17:05)  HR: 62 (61 - 66)  BP: 189/66 (161/72 - 196/89)  BP(mean): --  ABP: --  ABP(mean): --  RR: 16 (16 - 17)  SpO2: 95% (94% - 100%)        MEDICATIONS  (STANDING):  heparin  Injectable 5000Unit(s) SubCutaneous every 12 hours  metoprolol 50milliGRAM(s) Oral every 12 hours  meclizine 25milliGRAM(s) Oral three times a day  ALBUTerol    90 MICROgram(s) HFA Inhaler 1Puff(s) Inhalation every 4 hours  tiotropium 18 MICROgram(s) Capsule 1Capsule(s) Inhalation daily  pantoprazole    Tablet 40milliGRAM(s) Oral before breakfast  aspirin enteric coated 81milliGRAM(s) Oral daily  FLUoxetine 40milliGRAM(s) Oral daily  atorvastatin 20milliGRAM(s) Oral at bedtime  docusate sodium 100milliGRAM(s) Oral three times a day  lisinopril 40milliGRAM(s) Oral daily  hydrochlorothiazide    Capsule 12.5milliGRAM(s) Oral daily  furosemide    Tablet 40milliGRAM(s) Oral daily    MEDICATIONS  (PRN):  guaiFENesin    Syrup 100milliGRAM(s) Oral every 6 hours PRN Cough  benzocaine 15 mG/menthol 3.6 mG Lozenge 1Lozenge Oral every 3 hours PRN Sore Throat  sodium chloride 0.65% Nasal 1Spray(s) Both Nostrils two times a day PRN Nasal Congestion  acetaminophen   Tablet. 650milliGRAM(s) Oral every 6 hours PRN Moderate Pain (4 - 6)  melatonin 3milliGRAM(s) Oral at bedtime PRN Insomnia  zolpidem 5milliGRAM(s) Oral at bedtime PRN Insomnia  benzonatate 100milliGRAM(s) Oral three times a day PRN Cough        PHYSICAL EXAM:    GENERAL: NAD, well-groomed, well-developed  HEAD:  Atraumatic, Normocephalic  EYES: EOMI, PERRLA, conjunctiva and sclera clear  ENMT: Moist mucous membranes  NECK: Supple, No JVD  NERVOUS SYSTEM:  Alert & Oriented X3, Motor Strength 5/5 B/L upper and lower extremities; DTRs 2+ intact and symmetric  CHEST/LUNG: Clear to auscultation bilaterally; No rales, rhonchi, wheezing, or rubs  HEART: Regular rate and rhythm; No murmurs, rubs, or gallops  ABDOMEN: Soft, Nontender, Nondistended; Bowel sounds present  EXTREMITIES:  2+ Peripheral Pulses, No clubbing, cyanosis, or edema    LABS:                        12.7   7.0   )-----------( 231      ( 04 Jan 2017 07:26 )             36.3     04 Jan 2017 07:26    135    |  97     |  16     ----------------------------<  98     3.5     |  30     |  0.72     Ca    9.0        04 Jan 2017 07:26  Mg     1.7       04 Jan 2017 07:26    TPro  6.6    /  Alb  3.4    /  TBili  0.4    /  DBili  x      /  AST  18     /  ALT  15     /  AlkPhos  61     03 Jan 2017 07:21          CAPILLARY BLOOD GLUCOSE      RECENT CULTURES:      RADIOLOGY & ADDITIONAL TESTS:  Imaging Personally Reviewed:  [ ] YES      Consultant(s) Notes Reviewed:  [ ] YES     Care Discussed with [ ] Consultants [X ] Patient [ ] Family  [x ]    [x ]  Other; RN  HEALTH ISSUES - PROBLEM Dx:  Gastroesophageal reflux disease without esophagitis: Gastroesophageal reflux disease without esophagitis  Insomnia, unspecified type: Insomnia, unspecified type  Respiratory distress, acute: Respiratory distress, acute  Essential hypertension: Essential hypertension  Acute on chronic diastolic (congestive) heart failure: Acute on chronic diastolic (congestive) heart failure        DVT/GI ppx  Discussed with pt @ bedside

## 2017-01-04 NOTE — PROGRESS NOTE ADULT - PROBLEM SELECTOR PLAN 2
melatonin
Plan: titrated ACE inhibitor to 40mg/day.Will add hydrochlorothiazide now
Will titrate ACE inhibitor and add hydrochlorothiazide.
Doubt PNA, PCT is neg as well. Cont to monitor off antibiotics

## 2017-01-04 NOTE — PROGRESS NOTE ADULT - PROBLEM SELECTOR PLAN 1
lasix/ace  cardiology consult
Plan: Continue diuretic therapy but can change to home dose once daily, continue Beta blockers and ACE inhibitor. Out of bed to chair and observe for signs of respiratory distress
Resume diuretic therapy, continue Beta blockers and ACE inhibitor. Out of bed to chair and observe for signs of respiratory distress.
resolved,pt gets on and off symptoms of dyspnea and palpitaitons.? LSE in past-needs more w/u   Send SOY, ESR, Ds DNA etc

## 2017-01-04 NOTE — PROGRESS NOTE ADULT - PROBLEM SELECTOR PROBLEM 1
Acute on chronic diastolic (congestive) heart failure
Respiratory distress, acute
Acute on chronic diastolic (congestive) heart failure
Acute on chronic diastolic (congestive) heart failure

## 2017-01-05 NOTE — DISCHARGE NOTE ADULT - MEDICATION SUMMARY - MEDICATIONS TO TAKE
I will START or STAY ON the medications listed below when I get home from the hospital:    aspirin 81 mg oral delayed release tablet  -- 1 tab(s) by mouth once a day  -- Indication: For ACUTE ON CHRONIC CONGESTIVE HEART FAILURE/ ASPIRATION PNEMON    acetaminophen 325 mg oral tablet  -- 2 tab(s) by mouth every 6 hours, As needed, Mild Pain  -- Indication: For ACUTE ON CHRONIC CONGESTIVE HEART FAILURE/ ASPIRATION PNEMON    lisinopril 20 mg oral tablet  -- 1 tab(s) by mouth every 12 hours  -- Indication: For Essential hypertension    FLUoxetine 40 mg oral capsule  -- 1 cap(s) by mouth once a day  -- Indication: For Insomnia, unspecified type    meclizine 25 mg oral tablet  -- 1 tab(s) by mouth 3 times a day  -- Indication: For vertigo    atorvastatin 20 mg oral tablet  -- 1 tab(s) by mouth once a day (at bedtime)  -- Indication: For hyperlipidemia    zolpidem 5 mg oral tablet  -- 1 tab(s) by mouth once a day (at bedtime), As needed, Insomnia MDD:4  -- Indication: For Insomnia, unspecified type    metoprolol tartrate 50 mg oral tablet  -- 1 tab(s) by mouth 2 times a day  -- Indication: For htn    albuterol CFC free 90 mcg/inh inhalation aerosol  -- 1 puff(s) inhaled every 4 hours - for bronchospasm  -- Indication: For bronchitis    tiotropium 18 mcg inhalation capsule  -- 1 cap(s) inhaled once a day  -- Indication: For bronchitis    furosemide 40 mg oral tablet  -- 1 tab(s) by mouth once a day  -- Indication: For Acute on chronic congestive heart failure, unspecified congestive heart failure type    docusate sodium 100 mg oral capsule  -- 1 cap(s) by mouth 3 times a day  -- Indication: For constipation    K-Tab 10 mEq oral tablet, extended release  -- 1 tab(s) by mouth once a day  -- It is very important that you take or use this exactly as directed.  Do not skip doses or discontinue unless directed by your doctor.  Medication should be taken with plenty of water.  Take with food or milk.    -- Indication: For Acute on chronic congestive heart failure, unspecified congestive heart failure type    pantoprazole 40 mg oral delayed release tablet  -- 1 tab(s) by mouth once a day (before a meal)  -- Indication: For Gerd

## 2017-01-05 NOTE — DISCHARGE NOTE ADULT - SECONDARY DIAGNOSIS.
Essential hypertension Insomnia, unspecified type Respiratory distress, acute Gastroesophageal reflux disease without esophagitis

## 2017-01-05 NOTE — DISCHARGE NOTE ADULT - CARE PLAN
Principal Discharge DX:	Acute on chronic congestive heart failure, unspecified congestive heart failure type  Goal:	no short of breath  Instructions for follow-up, activity and diet:	continue current meds  Follow up with cardiology  Secondary Diagnosis:	Essential hypertension  Secondary Diagnosis:	Insomnia, unspecified type  Secondary Diagnosis:	Respiratory distress, acute  Secondary Diagnosis:	Gastroesophageal reflux disease without esophagitis

## 2017-01-05 NOTE — DISCHARGE NOTE ADULT - CARE PROVIDERS DIRECT ADDRESSES
,DirectAddress_Unknown,[email or web address],DirectAddress_Unknown

## 2017-01-05 NOTE — DISCHARGE NOTE ADULT - PATIENT PORTAL LINK FT
“You can access the FollowHealth Patient Portal, offered by [facility], by registering with the following website: [email or web address]”

## 2017-08-27 ENCOUNTER — EMERGENCY (EMERGENCY)
Facility: HOSPITAL | Age: 82
LOS: 0 days | Discharge: HOME | End: 2017-08-28
Attending: EMERGENCY MEDICINE
Payer: MEDICARE

## 2017-08-27 VITALS
DIASTOLIC BLOOD PRESSURE: 60 MMHG | RESPIRATION RATE: 18 BRPM | TEMPERATURE: 99 F | WEIGHT: 154.98 LBS | SYSTOLIC BLOOD PRESSURE: 134 MMHG | OXYGEN SATURATION: 99 % | HEART RATE: 92 BPM | HEIGHT: 60 IN

## 2017-08-27 DIAGNOSIS — Z86.73 PERSONAL HISTORY OF TRANSIENT ISCHEMIC ATTACK (TIA), AND CEREBRAL INFARCTION WITHOUT RESIDUAL DEFICITS: ICD-10-CM

## 2017-08-27 DIAGNOSIS — Z96.653 PRESENCE OF ARTIFICIAL KNEE JOINT, BILATERAL: ICD-10-CM

## 2017-08-27 DIAGNOSIS — Z88.8 ALLERGY STATUS TO OTHER DRUGS, MEDICAMENTS AND BIOLOGICAL SUBSTANCES: ICD-10-CM

## 2017-08-27 DIAGNOSIS — I50.9 HEART FAILURE, UNSPECIFIED: ICD-10-CM
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
## 2017-08-27 DIAGNOSIS — Z91.011 ALLERGY TO MILK PRODUCTS: ICD-10-CM

## 2017-08-27 DIAGNOSIS — I10 ESSENTIAL (PRIMARY) HYPERTENSION: ICD-10-CM

## 2017-08-27 DIAGNOSIS — H91.90 UNSPECIFIED HEARING LOSS, UNSPECIFIED EAR: ICD-10-CM
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
## 2017-08-27 DIAGNOSIS — S42.302A UNSPECIFIED FRACTURE OF SHAFT OF HUMERUS, LEFT ARM, INITIAL ENCOUNTER FOR CLOSED FRACTURE: ICD-10-CM

## 2017-08-27 DIAGNOSIS — M79.602 PAIN IN LEFT ARM: ICD-10-CM

## 2017-08-27 DIAGNOSIS — Z96.653 PRESENCE OF ARTIFICIAL KNEE JOINT, BILATERAL: Chronic | ICD-10-CM

## 2017-08-27 DIAGNOSIS — Z95.1 PRESENCE OF AORTOCORONARY BYPASS GRAFT: Chronic | ICD-10-CM

## 2017-08-27 DIAGNOSIS — K21.9 GASTRO-ESOPHAGEAL REFLUX DISEASE WITHOUT ESOPHAGITIS: ICD-10-CM

## 2017-08-27 DIAGNOSIS — G47.00 INSOMNIA, UNSPECIFIED: ICD-10-CM

## 2017-08-27 DIAGNOSIS — Z95.1 PRESENCE OF AORTOCORONARY BYPASS GRAFT: ICD-10-CM

## 2017-08-27 PROCEDURE — 73030 X-RAY EXAM OF SHOULDER: CPT | Mod: 26,LT

## 2017-08-27 PROCEDURE — 99284 EMERGENCY DEPT VISIT MOD MDM: CPT

## 2017-08-27 PROCEDURE — 73060 X-RAY EXAM OF HUMERUS: CPT | Mod: 26,LT

## 2017-08-27 RX ORDER — OXYCODONE AND ACETAMINOPHEN 5; 325 MG/1; MG/1
1 TABLET ORAL ONCE
Qty: 0 | Refills: 0 | Status: DISCONTINUED | OUTPATIENT
Start: 2017-08-27 | End: 2017-08-27

## 2017-08-27 RX ORDER — PANTOPRAZOLE SODIUM 20 MG/1
40 TABLET, DELAYED RELEASE ORAL ONCE
Qty: 0 | Refills: 0 | Status: COMPLETED | OUTPATIENT
Start: 2017-08-27 | End: 2017-08-27

## 2017-08-27 RX ADMIN — OXYCODONE AND ACETAMINOPHEN 1 TABLET(S): 5; 325 TABLET ORAL at 23:22

## 2017-08-27 RX ADMIN — PANTOPRAZOLE SODIUM 40 MILLIGRAM(S): 20 TABLET, DELAYED RELEASE ORAL at 23:22

## 2017-08-27 NOTE — ED PROVIDER NOTE - CONSTITUTIONAL, MLM
normal... Well appearing, well nourished, awake, alert, oriented to person, place, time/situation and in no apparent distress.no sign of head trauma

## 2017-08-27 NOTE — ED PROVIDER NOTE - NEURO NEGATIVE STATEMENT, MLM
no loss of consciousness, no gait abnormality, no headache, no sensory deficits, and no weakness. + baseline vertiago

## 2017-08-27 NOTE — ED PROVIDER NOTE - OBJECTIVE STATEMENT
this is a 84 yo f bib daughter w a pmhx of htn, hearing impaired, vertigo c/o of left shoulder and upper arm pain s/p fall while gardening x 1 day. Pt falls frequently bc of baseline unsteady gait.  Pt was seen by city md and was told to f/u w orthopedic for comminuted humerus fracture. Pt and daughter denies head trauma, loc, blood thinner, nausea, vomiting, chest pain, neck pain. this is a 84 yo f bib daughter w a pmhx of htn, hearing impaired, vertigo c/o of left shoulder and upper arm pain s/p fall while gardening x 1 day. Pt falls frequently bc of baseline unsteady gait.  Pt was seen by city md and was told to f/u w orthopedic for comminuted humerus fracture. Pt and daughter denies head trauma, loc, blood thinner, nausea, vomiting, chest pain, neck pain, neuro defict. pain worst with movement. she is not taken anything for pain

## 2017-08-27 NOTE — ED PROVIDER NOTE - MEDICAL DECISION MAKING DETAILS
Ddx: L. humerus fx/ no neurovascular deficit  plan: Ortho consult, already in sling, d/c to ortho f/u
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
## 2017-08-28 VITALS
HEART RATE: 87 BPM | OXYGEN SATURATION: 98 % | TEMPERATURE: 98 F | DIASTOLIC BLOOD PRESSURE: 63 MMHG | RESPIRATION RATE: 18 BRPM | SYSTOLIC BLOOD PRESSURE: 143 MMHG

## 2017-09-23 ENCOUNTER — INPATIENT (INPATIENT)
Facility: HOSPITAL | Age: 82
LOS: 1 days | Discharge: HOME HEALTH | End: 2017-09-25
Attending: INTERNAL MEDICINE | Admitting: INTERNAL MEDICINE
Payer: MEDICARE

## 2017-09-23 VITALS
TEMPERATURE: 97 F | RESPIRATION RATE: 16 BRPM | HEART RATE: 118 BPM | SYSTOLIC BLOOD PRESSURE: 137 MMHG | HEIGHT: 64 IN | WEIGHT: 147.93 LBS | OXYGEN SATURATION: 98 % | DIASTOLIC BLOOD PRESSURE: 80 MMHG

## 2017-09-23 DIAGNOSIS — K21.9 GASTRO-ESOPHAGEAL REFLUX DISEASE WITHOUT ESOPHAGITIS: ICD-10-CM

## 2017-09-23 DIAGNOSIS — Z29.9 ENCOUNTER FOR PROPHYLACTIC MEASURES, UNSPECIFIED: ICD-10-CM

## 2017-09-23 DIAGNOSIS — Z95.1 PRESENCE OF AORTOCORONARY BYPASS GRAFT: Chronic | ICD-10-CM

## 2017-09-23 DIAGNOSIS — R41.89 OTHER SYMPTOMS AND SIGNS INVOLVING COGNITIVE FUNCTIONS AND AWARENESS: ICD-10-CM

## 2017-09-23 DIAGNOSIS — I10 ESSENTIAL (PRIMARY) HYPERTENSION: ICD-10-CM

## 2017-09-23 DIAGNOSIS — I50.9 HEART FAILURE, UNSPECIFIED: ICD-10-CM

## 2017-09-23 DIAGNOSIS — G47.00 INSOMNIA, UNSPECIFIED: ICD-10-CM

## 2017-09-23 DIAGNOSIS — Z96.653 PRESENCE OF ARTIFICIAL KNEE JOINT, BILATERAL: Chronic | ICD-10-CM

## 2017-09-23 DIAGNOSIS — Z65.9 PROBLEM RELATED TO UNSPECIFIED PSYCHOSOCIAL CIRCUMSTANCES: ICD-10-CM

## 2017-09-23 DIAGNOSIS — M79.602 PAIN IN LEFT ARM: ICD-10-CM

## 2017-09-23 DIAGNOSIS — I25.810 ATHEROSCLEROSIS OF CORONARY ARTERY BYPASS GRAFT(S) WITHOUT ANGINA PECTORIS: ICD-10-CM

## 2017-09-23 DIAGNOSIS — J45.20 MILD INTERMITTENT ASTHMA, UNCOMPLICATED: ICD-10-CM

## 2017-09-23 DIAGNOSIS — R69 ILLNESS, UNSPECIFIED: ICD-10-CM

## 2017-09-23 LAB
ALBUMIN SERPL ELPH-MCNC: 3.9 G/DL — SIGNIFICANT CHANGE UP (ref 3.3–5)
ALP SERPL-CCNC: 137 U/L — HIGH (ref 40–120)
ALT FLD-CCNC: 32 U/L — SIGNIFICANT CHANGE UP (ref 12–78)
ANION GAP SERPL CALC-SCNC: 14 MMOL/L — SIGNIFICANT CHANGE UP (ref 5–17)
AST SERPL-CCNC: 45 U/L — HIGH (ref 15–37)
BILIRUB SERPL-MCNC: 0.8 MG/DL — SIGNIFICANT CHANGE UP (ref 0.2–1.2)
BUN SERPL-MCNC: 15 MG/DL — SIGNIFICANT CHANGE UP (ref 7–23)
CALCIUM SERPL-MCNC: 9.3 MG/DL — SIGNIFICANT CHANGE UP (ref 8.5–10.1)
CHLORIDE SERPL-SCNC: 92 MMOL/L — LOW (ref 96–108)
CO2 SERPL-SCNC: 24 MMOL/L — SIGNIFICANT CHANGE UP (ref 22–31)
CREAT SERPL-MCNC: 0.85 MG/DL — SIGNIFICANT CHANGE UP (ref 0.5–1.3)
ETHANOL SERPL-MCNC: <10 MG/DL — SIGNIFICANT CHANGE UP (ref 0–10)
GLUCOSE SERPL-MCNC: 126 MG/DL — HIGH (ref 70–99)
HCT VFR BLD CALC: 39.1 % — SIGNIFICANT CHANGE UP (ref 34.5–45)
HGB BLD-MCNC: 13 G/DL — SIGNIFICANT CHANGE UP (ref 11.5–15.5)
MCHC RBC-ENTMCNC: 29.8 PG — SIGNIFICANT CHANGE UP (ref 27–34)
MCHC RBC-ENTMCNC: 33.2 GM/DL — SIGNIFICANT CHANGE UP (ref 32–36)
MCV RBC AUTO: 89.6 FL — SIGNIFICANT CHANGE UP (ref 80–100)
PLATELET # BLD AUTO: 474 K/UL — HIGH (ref 150–400)
POTASSIUM SERPL-MCNC: 3.4 MMOL/L — LOW (ref 3.5–5.3)
POTASSIUM SERPL-SCNC: 3.4 MMOL/L — LOW (ref 3.5–5.3)
PROT SERPL-MCNC: 8.1 GM/DL — SIGNIFICANT CHANGE UP (ref 6–8.3)
RBC # BLD: 4.36 M/UL — SIGNIFICANT CHANGE UP (ref 3.8–5.2)
RBC # FLD: 15.4 % — HIGH (ref 11–15)
SODIUM SERPL-SCNC: 130 MMOL/L — LOW (ref 135–145)
TROPONIN I SERPL-MCNC: <.015 NG/ML — SIGNIFICANT CHANGE UP (ref 0.01–0.04)
TSH SERPL-MCNC: 0.98 UIU/ML — SIGNIFICANT CHANGE UP (ref 0.36–3.74)
WBC # BLD: 7.8 K/UL — SIGNIFICANT CHANGE UP (ref 3.8–10.5)
WBC # FLD AUTO: 7.8 K/UL — SIGNIFICANT CHANGE UP (ref 3.8–10.5)

## 2017-09-23 PROCEDURE — 99223 1ST HOSP IP/OBS HIGH 75: CPT

## 2017-09-23 PROCEDURE — 70450 CT HEAD/BRAIN W/O DYE: CPT | Mod: 26

## 2017-09-23 PROCEDURE — 73030 X-RAY EXAM OF SHOULDER: CPT | Mod: 26,LT

## 2017-09-23 PROCEDURE — 73060 X-RAY EXAM OF HUMERUS: CPT | Mod: 26,LT

## 2017-09-23 PROCEDURE — 90792 PSYCH DIAG EVAL W/MED SRVCS: CPT | Mod: GT

## 2017-09-23 PROCEDURE — 99284 EMERGENCY DEPT VISIT MOD MDM: CPT

## 2017-09-23 RX ORDER — PANTOPRAZOLE SODIUM 20 MG/1
40 TABLET, DELAYED RELEASE ORAL
Qty: 0 | Refills: 0 | Status: DISCONTINUED | OUTPATIENT
Start: 2017-09-23 | End: 2017-09-25

## 2017-09-23 RX ORDER — MECLIZINE HCL 12.5 MG
25 TABLET ORAL THREE TIMES A DAY
Qty: 0 | Refills: 0 | Status: DISCONTINUED | OUTPATIENT
Start: 2017-09-23 | End: 2017-09-25

## 2017-09-23 RX ORDER — DOCUSATE SODIUM 100 MG
100 CAPSULE ORAL THREE TIMES A DAY
Qty: 0 | Refills: 0 | Status: DISCONTINUED | OUTPATIENT
Start: 2017-09-23 | End: 2017-09-25

## 2017-09-23 RX ORDER — ASPIRIN/CALCIUM CARB/MAGNESIUM 324 MG
81 TABLET ORAL DAILY
Qty: 0 | Refills: 0 | Status: DISCONTINUED | OUTPATIENT
Start: 2017-09-23 | End: 2017-09-25

## 2017-09-23 RX ORDER — LISINOPRIL 2.5 MG/1
20 TABLET ORAL ONCE
Qty: 0 | Refills: 0 | Status: COMPLETED | OUTPATIENT
Start: 2017-09-23 | End: 2017-09-23

## 2017-09-23 RX ORDER — HALOPERIDOL DECANOATE 100 MG/ML
2 INJECTION INTRAMUSCULAR ONCE
Qty: 0 | Refills: 0 | Status: COMPLETED | OUTPATIENT
Start: 2017-09-23 | End: 2017-09-23

## 2017-09-23 RX ORDER — METOPROLOL TARTRATE 50 MG
50 TABLET ORAL
Qty: 0 | Refills: 0 | Status: DISCONTINUED | OUTPATIENT
Start: 2017-09-23 | End: 2017-09-25

## 2017-09-23 RX ORDER — IPRATROPIUM/ALBUTEROL SULFATE 18-103MCG
3 AEROSOL WITH ADAPTER (GRAM) INHALATION EVERY 6 HOURS
Qty: 0 | Refills: 0 | Status: DISCONTINUED | OUTPATIENT
Start: 2017-09-23 | End: 2017-09-25

## 2017-09-23 RX ORDER — ZOLPIDEM TARTRATE 10 MG/1
5 TABLET ORAL AT BEDTIME
Qty: 0 | Refills: 0 | Status: DISCONTINUED | OUTPATIENT
Start: 2017-09-23 | End: 2017-09-25

## 2017-09-23 RX ORDER — METOPROLOL TARTRATE 50 MG
50 TABLET ORAL ONCE
Qty: 0 | Refills: 0 | Status: COMPLETED | OUTPATIENT
Start: 2017-09-23 | End: 2017-09-23

## 2017-09-23 RX ORDER — POTASSIUM CHLORIDE 20 MEQ
40 PACKET (EA) ORAL ONCE
Qty: 0 | Refills: 0 | Status: COMPLETED | OUTPATIENT
Start: 2017-09-23 | End: 2017-09-23

## 2017-09-23 RX ORDER — HEPARIN SODIUM 5000 [USP'U]/ML
5000 INJECTION INTRAVENOUS; SUBCUTANEOUS EVERY 8 HOURS
Qty: 0 | Refills: 0 | Status: DISCONTINUED | OUTPATIENT
Start: 2017-09-23 | End: 2017-09-25

## 2017-09-23 RX ORDER — OLANZAPINE 15 MG/1
5 TABLET, FILM COATED ORAL EVERY 6 HOURS
Qty: 0 | Refills: 0 | Status: DISCONTINUED | OUTPATIENT
Start: 2017-09-23 | End: 2017-09-25

## 2017-09-23 RX ORDER — SODIUM CHLORIDE 9 MG/ML
1000 INJECTION INTRAMUSCULAR; INTRAVENOUS; SUBCUTANEOUS ONCE
Qty: 0 | Refills: 0 | Status: COMPLETED | OUTPATIENT
Start: 2017-09-23 | End: 2017-09-23

## 2017-09-23 RX ORDER — POTASSIUM CHLORIDE 20 MEQ
40 PACKET (EA) ORAL ONCE
Qty: 0 | Refills: 0 | Status: DISCONTINUED | OUTPATIENT
Start: 2017-09-23 | End: 2017-09-23

## 2017-09-23 RX ORDER — FLUOXETINE HCL 10 MG
40 CAPSULE ORAL DAILY
Qty: 0 | Refills: 0 | Status: DISCONTINUED | OUTPATIENT
Start: 2017-09-23 | End: 2017-09-25

## 2017-09-23 RX ORDER — ONDANSETRON 8 MG/1
4 TABLET, FILM COATED ORAL ONCE
Qty: 0 | Refills: 0 | Status: COMPLETED | OUTPATIENT
Start: 2017-09-23 | End: 2017-09-23

## 2017-09-23 RX ORDER — DIAZEPAM 5 MG
2 TABLET ORAL ONCE
Qty: 0 | Refills: 0 | Status: DISCONTINUED | OUTPATIENT
Start: 2017-09-23 | End: 2017-09-23

## 2017-09-23 RX ORDER — OXYCODONE AND ACETAMINOPHEN 5; 325 MG/1; MG/1
1 TABLET ORAL THREE TIMES A DAY
Qty: 0 | Refills: 0 | Status: DISCONTINUED | OUTPATIENT
Start: 2017-09-23 | End: 2017-09-23

## 2017-09-23 RX ORDER — FUROSEMIDE 40 MG
40 TABLET ORAL DAILY
Qty: 0 | Refills: 0 | Status: DISCONTINUED | OUTPATIENT
Start: 2017-09-23 | End: 2017-09-25

## 2017-09-23 RX ORDER — ACETAMINOPHEN 500 MG
650 TABLET ORAL EVERY 6 HOURS
Qty: 0 | Refills: 0 | Status: DISCONTINUED | OUTPATIENT
Start: 2017-09-23 | End: 2017-09-25

## 2017-09-23 RX ORDER — OLANZAPINE 15 MG/1
2.5 TABLET, FILM COATED ORAL EVERY 6 HOURS
Qty: 0 | Refills: 0 | Status: DISCONTINUED | OUTPATIENT
Start: 2017-09-23 | End: 2017-09-25

## 2017-09-23 RX ORDER — LISINOPRIL 2.5 MG/1
20 TABLET ORAL DAILY
Qty: 0 | Refills: 0 | Status: DISCONTINUED | OUTPATIENT
Start: 2017-09-23 | End: 2017-09-25

## 2017-09-23 RX ORDER — KETOROLAC TROMETHAMINE 30 MG/ML
30 SYRINGE (ML) INJECTION ONCE
Qty: 0 | Refills: 0 | Status: DISCONTINUED | OUTPATIENT
Start: 2017-09-23 | End: 2017-09-23

## 2017-09-23 RX ORDER — ATORVASTATIN CALCIUM 80 MG/1
20 TABLET, FILM COATED ORAL AT BEDTIME
Qty: 0 | Refills: 0 | Status: DISCONTINUED | OUTPATIENT
Start: 2017-09-23 | End: 2017-09-25

## 2017-09-23 RX ORDER — TIOTROPIUM BROMIDE 18 UG/1
1 CAPSULE ORAL; RESPIRATORY (INHALATION) DAILY
Qty: 0 | Refills: 0 | Status: DISCONTINUED | OUTPATIENT
Start: 2017-09-23 | End: 2017-09-25

## 2017-09-23 RX ORDER — ALBUTEROL 90 UG/1
1 AEROSOL, METERED ORAL EVERY 4 HOURS
Qty: 0 | Refills: 0 | Status: DISCONTINUED | OUTPATIENT
Start: 2017-09-23 | End: 2017-09-25

## 2017-09-23 RX ORDER — ACETAMINOPHEN 500 MG
975 TABLET ORAL ONCE
Qty: 0 | Refills: 0 | Status: COMPLETED | OUTPATIENT
Start: 2017-09-23 | End: 2017-09-23

## 2017-09-23 RX ADMIN — HEPARIN SODIUM 5000 UNIT(S): 5000 INJECTION INTRAVENOUS; SUBCUTANEOUS at 22:22

## 2017-09-23 RX ADMIN — Medication 2 MILLIGRAM(S): at 16:01

## 2017-09-23 RX ADMIN — Medication 650 MILLIGRAM(S): at 22:22

## 2017-09-23 RX ADMIN — Medication 50 MILLIGRAM(S): at 20:26

## 2017-09-23 RX ADMIN — Medication 30 MILLIGRAM(S): at 13:43

## 2017-09-23 RX ADMIN — ATORVASTATIN CALCIUM 20 MILLIGRAM(S): 80 TABLET, FILM COATED ORAL at 22:22

## 2017-09-23 RX ADMIN — LISINOPRIL 20 MILLIGRAM(S): 2.5 TABLET ORAL at 20:26

## 2017-09-23 RX ADMIN — SODIUM CHLORIDE 1000 MILLILITER(S): 9 INJECTION INTRAMUSCULAR; INTRAVENOUS; SUBCUTANEOUS at 13:39

## 2017-09-23 RX ADMIN — Medication 40 MILLIEQUIVALENT(S): at 22:22

## 2017-09-23 RX ADMIN — ONDANSETRON 4 MILLIGRAM(S): 8 TABLET, FILM COATED ORAL at 13:50

## 2017-09-23 RX ADMIN — HALOPERIDOL DECANOATE 2 MILLIGRAM(S): 100 INJECTION INTRAMUSCULAR at 17:41
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
## 2017-09-23 NOTE — ED ADULT NURSE REASSESSMENT NOTE - NS ED NURSE REASSESS COMMENT FT1
Patient gave this nurse her daughter (Thu's) number [phone], tried to reach her daughter but no answer, not able to leave a voice mail on machine. No VM available to leave a message.

## 2017-09-23 NOTE — ED BEHAVIORAL HEALTH ASSESSMENT NOTE - ORIENTATION OTHER
knows she is at [facility], xiao month and year, at first said she is "60" yrs old then quickly changed it to 83.
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
## 2017-09-23 NOTE — ED BEHAVIORAL HEALTH NOTE - BEHAVIORAL HEALTH NOTE
BTCM contacted pts daughter for collateral information [phone], the phone kept ringing and there was no voicemail to leave a message.

## 2017-09-23 NOTE — ED ADULT NURSE REASSESSMENT NOTE - NS ED NURSE REASSESS COMMENT FT1
Patient introduced to Evening shift nurse, explained to patient that she will be moved to private room for telepsych. Water bottle provided Patient introduced to Evening shift nurse, explained to patient that she will be moved to private room for telepsych. Water bottle provided. Patient AAOX3. 20 Gauge L- AC
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
## 2017-09-23 NOTE — ED PROVIDER NOTE - PHYSICAL EXAMINATION
Vitals: tachy 118, otherwise wnl  Gen: AAOx3, NAD, visibly in pain,  cradling left arm, wearing shoulder splint  Head: ncat, perrla, eomi b/l  Neck: supple, no lymphadenopathy, no midline deviation  Heart: rrr, no m/r/g  Lungs: CTA b/l, no rales/ronchi/wheezes  Abd: soft, nontender, non-distended, no rebound or guarding  Ext: no clubbing/cyanosis/edema, LUE tender to palpation, no bruising, no gross deformity or skin break  Neuro: sensation and muscle strength intact b/l, steady gait
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
## 2017-09-23 NOTE — ED BEHAVIORAL HEALTH ASSESSMENT NOTE - PSYCHIATRIC ISSUES AND PLAN (INCLUDE STANDING AND PRN MEDICATION)
for agitation, Zyprexa 2.5mg PO q6hrs, 5mg IM q6hrs PRN for SEVERE agitation (aggression, combativeness)
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
## 2017-09-23 NOTE — ED PROVIDER NOTE - OBJECTIVE STATEMENT
82 yo F with L arm pain for 1 month.  Pt. states she fell while gardening and broke her left arm.  She was told she didn't need surgery.  She can't take narcotics because she is allergic to them.  Pt. feels like her daughter isn't taking care of her--her daughter is currently sick at home.  Pt. denies reinjury.  No other complaints.   ROS: negative for fever, cough, headache, chest pain, shortness of breath, abd pain, nausea, vomiting, diarrhea, rash, paresthesia, and weakness.   PMH: HTN, HLD, depresion, hearing impaired, vertigo; Meds: tylenol, albuterol, asa 81, atorvastatin 20, docusate, fluoxetine 40, furosemide 40, lisinopril 20, meclizine 25, metoprolol 50, pantoprazole 40, zolpidem 5; SH: Denies smoking/drinking/drug use
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
## 2017-09-23 NOTE — H&P ADULT - PROBLEM SELECTOR PLAN 1
Patient left in ED by her daughter, family not available at time of encounter.  Patient is ADL/IADL dependent  SW and PT consults  Cleared by psychiatry, no SI/HI  For agitation, Zyprexa 2.5mg PO q6hrs, 5mg IM q6hrs PRN for SEVERE agitation (aggression, combativeness) as per psychiatry.

## 2017-09-23 NOTE — ED ADULT NURSE REASSESSMENT NOTE - NS ED NURSE REASSESS COMMENT FT1
Patients family at bedside, stating "Pearl never seen her so confused", emotional support provided, patient consoled and asked if she needed anything, patient began to yell, "No one has come to see me no one comes to see me". Patient re-oriented and re-educated on which staff members have come to see her and ask her about her medical history, patient unable to calm down, MD made aware. Valium 2mg PO given to patient. Will continue to monitor.

## 2017-09-23 NOTE — ED BEHAVIORAL HEALTH ASSESSMENT NOTE - OTHER PAST PSYCHIATRIC HISTORY (INCLUDE DETAILS REGARDING ONSET, COURSE OF ILLNESS, INPATIENT/OUTPATIENT TREATMENT)
Patient denies she was ever psychiatrically admitted; denies suicide attempts; denies drug/substance use.  Patient cannot recall what type of medications she is on at this time, including antidepressants

## 2017-09-23 NOTE — ED BEHAVIORAL HEALTH ASSESSMENT NOTE - NS ED [facility] ED COURSE PSYCHIATRIC MEDICATION GIVEN
Involuntary Intramuscular (indication, name, dose, time)/Oral Medication (indication, name, dose, time)

## 2017-09-23 NOTE — H&P ADULT - NSHPLABSRESULTS_GEN_ALL_CORE
Vital Signs Last 24 Hrs  T(C): 36.9 (23 Sep 2017 20:35), Max: 36.9 (23 Sep 2017 20:35)  T(F): 98.4 (23 Sep 2017 20:35), Max: 98.4 (23 Sep 2017 20:35)  HR: 100 (23 Sep 2017 20:35) (87 - 118)  BP: 199/90 (23 Sep 2017 20:35) (137/80 - 199/90)  BP(mean): --  RR: 18 (23 Sep 2017 20:35) (16 - 18)  SpO2: 98% (23 Sep 2017 20:35) (98% - 98%)        LABS:                        13.0   7.8   )-----------( 474      ( 23 Sep 2017 13:44 )             39.1     09-23    130<L>  |  92<L>  |  15  ----------------------------<  126<H>  3.4<L>   |  24  |  0.85    Ca    9.3      23 Sep 2017 13:44    TPro  8.1  /  Alb  3.9  /  TBili  0.8  /  DBili  x   /  AST  45<H>  /  ALT  32  /  AlkPhos  137<H>  09-23

## 2017-09-23 NOTE — H&P ADULT - PROBLEM SELECTOR PLAN 5
CT head unremarkable for acute changes.  Unclear if ever worked up, will send TSH, RPR, Vit B12  1:1 as per psychiatry  Support and reorientation  For agitation, Zyprexa 2.5mg PO q6hrs, 5mg IM q6hrs PRN for SEVERE agitation (aggression, combativeness).

## 2017-09-23 NOTE — H&P ADULT - HISTORY OF PRESENT ILLNESS
83 year old woman with PMH HTN, CAD s/p CABG, CHF, gerd, HLD, mild Dementia, asthma, left arm fracture recently not requiring surgery brought by daughter and to be admitted for placement as she has no safe discharge location.  Her daughter apparently cannot take care of her at home as per charts.  She will not cooperate with interview and says "I've seen enough people today, leave me alone".  Seen by psychiatry in ED as daytime ED attending reported her making a suicidal comment.  She was cleared by psychiatry for admission to medical floor pending placement.

## 2017-09-23 NOTE — ED BEHAVIORAL HEALTH ASSESSMENT NOTE - CURRENT MEDICATION
Percocet 5/325mg PO up to TID prn; tiotropium 18 mcg INH cap qd; albuterol CFC free 90 mcg/inh inhalation aerosol: 1 puff(s) INH q4hrs; K-Tab 10  ER mEq qd; pantoprazole 40 mg PO DR qd; furosemide 40 mg PO qd; metoprolol tartrate 50 mg PO bid; aspirin 81 mg PO qd; atorvastatin 20 mg PO qhs; lisinopril 20 mg PO q12hrs; Prozac 40 mg PO qd; meclizine 25 mg PO tid; zolpidem 5 mg PO qhs PRN; Tylenol PRN; docusate sodium 100 mg po tid

## 2017-09-23 NOTE — ED BEHAVIORAL HEALTH ASSESSMENT NOTE - DESCRIPTION
As per ED nurse note “Patients family at bedside, stating "I’ve never seen her so confused", emotional support provided, patient consoled and asked if she needed anything, patient began to yell, "No one has come to see me no one comes to see me". Patient re-oriented and re-educated on which staff members have come to see her and ask her about her medical history, patient unable to calm down, MD made aware. Valium 2mg PO given to patient” Also haldol 2mg IVP for subsequent agitation and refusal to cooperative with medical assessment/treatment     Zofran, Toradol, Tylenol also given    Patient refused to cooperate with giving a urine sample Percocet 5/325mg PO up to TID prn; tiotropium 18 mcg INH cap qd; albuterol CFC free 90 mcg/inh inhalation aerosol: 1 puff(s) INH q4hrs; K-Tab 10  ER mEq qd; pantoprazole 40 mg PO DR qd; furosemide 40 mg PO qd; metoprolol tartrate 50 mg PO bid; aspirin 81 mg PO qd; atorvastatin 20 mg PO qhs; lisinopril 20 mg PO q12hrs; Prozac 40 mg PO qd; meclizine 25 mg PO tid; zolpidem 5 mg PO qhs PRN; Tylenol PRN; docusate sodium 100 mg po tid see hpi

## 2017-09-23 NOTE — ED PROVIDER NOTE - MEDICAL DECISION MAKING DETAILS
84 yo F with LUE pain s/p fracture  -chart review shows: Aug 27th X-ray L humerus: comminuted fracture of the surgical neck area  -pain control, hydrate iv NS, xr shoulder, basic labs  -f/u results, refer ortho outpt.
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
## 2017-09-23 NOTE — H&P ADULT - NSHPPHYSICALEXAM_GEN_ALL_CORE
GENERAL: Elderly woman on stretcher; seems upset  HEAD:  Atraumatic, Normocephalic  EYES: EOMI, PERRLA, conjunctiva and sclera clear  ENMT: No tonsillar erythema, exudates, or enlargement; Moist mucous membranes, No lesions  NECK: Supple, No JVD, Normal thyroid  NERVOUS SYSTEM:  Alert & Oriented X3, Good concentration  CHEST/LUNG: Clear to auscultation bilaterally; No rales, rhonchi, wheezing, or rubs  HEART: Regular rate and rhythm; No murmurs, rubs, or gallops  ABDOMEN: Soft, Nontender, Nondistended; Bowel sounds present  EXTREMITIES: limited ROM LUE due to pain  PSYCH: agitated, withdrawn

## 2017-09-24 DIAGNOSIS — R53.81 OTHER MALAISE: ICD-10-CM

## 2017-09-24 DIAGNOSIS — E87.1 HYPO-OSMOLALITY AND HYPONATREMIA: ICD-10-CM

## 2017-09-24 DIAGNOSIS — S42.213S: ICD-10-CM

## 2017-09-24 DIAGNOSIS — R29.6 REPEATED FALLS: ICD-10-CM

## 2017-09-24 LAB
ANION GAP SERPL CALC-SCNC: 11 MMOL/L — SIGNIFICANT CHANGE UP (ref 5–17)
APPEARANCE UR: CLEAR — SIGNIFICANT CHANGE UP
BILIRUB UR-MCNC: NEGATIVE — SIGNIFICANT CHANGE UP
BUN SERPL-MCNC: 15 MG/DL — SIGNIFICANT CHANGE UP (ref 7–23)
CALCIUM SERPL-MCNC: 9.2 MG/DL — SIGNIFICANT CHANGE UP (ref 8.5–10.1)
CHLORIDE SERPL-SCNC: 93 MMOL/L — LOW (ref 96–108)
CO2 SERPL-SCNC: 28 MMOL/L — SIGNIFICANT CHANGE UP (ref 22–31)
COLOR SPEC: YELLOW — SIGNIFICANT CHANGE UP
CREAT SERPL-MCNC: 0.61 MG/DL — SIGNIFICANT CHANGE UP (ref 0.5–1.3)
DIFF PNL FLD: ABNORMAL
GLUCOSE SERPL-MCNC: 104 MG/DL — HIGH (ref 70–99)
GLUCOSE UR QL: NEGATIVE MG/DL — SIGNIFICANT CHANGE UP
HCT VFR BLD CALC: 34.5 % — SIGNIFICANT CHANGE UP (ref 34.5–45)
HGB BLD-MCNC: 11.4 G/DL — LOW (ref 11.5–15.5)
KETONES UR-MCNC: ABNORMAL
LEUKOCYTE ESTERASE UR-ACNC: NEGATIVE — SIGNIFICANT CHANGE UP
MCHC RBC-ENTMCNC: 30.7 PG — SIGNIFICANT CHANGE UP (ref 27–34)
MCHC RBC-ENTMCNC: 33.2 GM/DL — SIGNIFICANT CHANGE UP (ref 32–36)
MCV RBC AUTO: 92.5 FL — SIGNIFICANT CHANGE UP (ref 80–100)
NITRITE UR-MCNC: NEGATIVE — SIGNIFICANT CHANGE UP
PCP SPEC-MCNC: SIGNIFICANT CHANGE UP
PH UR: 6.5 — SIGNIFICANT CHANGE UP (ref 5–8)
PLATELET # BLD AUTO: 359 K/UL — SIGNIFICANT CHANGE UP (ref 150–400)
POTASSIUM SERPL-MCNC: 3.7 MMOL/L — SIGNIFICANT CHANGE UP (ref 3.5–5.3)
POTASSIUM SERPL-SCNC: 3.7 MMOL/L — SIGNIFICANT CHANGE UP (ref 3.5–5.3)
PROT UR-MCNC: 100 MG/DL
RBC # BLD: 3.72 M/UL — LOW (ref 3.8–5.2)
RBC # FLD: 15.4 % — HIGH (ref 11–15)
SODIUM SERPL-SCNC: 132 MMOL/L — LOW (ref 135–145)
SP GR SPEC: 1.01 — SIGNIFICANT CHANGE UP (ref 1.01–1.02)
UROBILINOGEN FLD QL: 4 MG/DL
VIT B12 SERPL-MCNC: 855 PG/ML — SIGNIFICANT CHANGE UP (ref 243–894)
WBC # BLD: 6.8 K/UL — SIGNIFICANT CHANGE UP (ref 3.8–10.5)
WBC # FLD AUTO: 6.8 K/UL — SIGNIFICANT CHANGE UP (ref 3.8–10.5)

## 2017-09-24 PROCEDURE — 99233 SBSQ HOSP IP/OBS HIGH 50: CPT

## 2017-09-24 PROCEDURE — 93010 ELECTROCARDIOGRAM REPORT: CPT

## 2017-09-24 RX ORDER — INFLUENZA VIRUS VACCINE 15; 15; 15; 15 UG/.5ML; UG/.5ML; UG/.5ML; UG/.5ML
0.5 SUSPENSION INTRAMUSCULAR ONCE
Qty: 0 | Refills: 0 | Status: DISCONTINUED | OUTPATIENT
Start: 2017-09-24 | End: 2017-09-25

## 2017-09-24 RX ADMIN — Medication 40 MILLIGRAM(S): at 06:19

## 2017-09-24 RX ADMIN — PANTOPRAZOLE SODIUM 40 MILLIGRAM(S): 20 TABLET, DELAYED RELEASE ORAL at 06:19

## 2017-09-24 RX ADMIN — HEPARIN SODIUM 5000 UNIT(S): 5000 INJECTION INTRAVENOUS; SUBCUTANEOUS at 06:19

## 2017-09-24 RX ADMIN — OLANZAPINE 2.5 MILLIGRAM(S): 15 TABLET, FILM COATED ORAL at 21:32

## 2017-09-24 RX ADMIN — Medication 50 MILLIGRAM(S): at 06:19

## 2017-09-24 RX ADMIN — HEPARIN SODIUM 5000 UNIT(S): 5000 INJECTION INTRAVENOUS; SUBCUTANEOUS at 21:21

## 2017-09-24 RX ADMIN — LISINOPRIL 20 MILLIGRAM(S): 2.5 TABLET ORAL at 06:19

## 2017-09-24 RX ADMIN — Medication 650 MILLIGRAM(S): at 06:30

## 2017-09-24 RX ADMIN — ZOLPIDEM TARTRATE 5 MILLIGRAM(S): 10 TABLET ORAL at 00:43

## 2017-09-24 RX ADMIN — Medication 40 MILLIGRAM(S): at 13:26

## 2017-09-24 RX ADMIN — Medication 650 MILLIGRAM(S): at 07:03

## 2017-09-24 RX ADMIN — HEPARIN SODIUM 5000 UNIT(S): 5000 INJECTION INTRAVENOUS; SUBCUTANEOUS at 13:27

## 2017-09-24 RX ADMIN — Medication 50 MILLIGRAM(S): at 17:47

## 2017-09-24 RX ADMIN — Medication 81 MILLIGRAM(S): at 13:27

## 2017-09-24 RX ADMIN — ATORVASTATIN CALCIUM 20 MILLIGRAM(S): 80 TABLET, FILM COATED ORAL at 21:20

## 2017-09-24 RX ADMIN — Medication 650 MILLIGRAM(S): at 00:29

## 2017-09-24 NOTE — PHYSICAL THERAPY INITIAL EVALUATION ADULT - GENERAL OBSERVATIONS, REHAB EVAL
Pt seen supine in bed, alert and Ox4, L upper extremity with sling intact, Fx of surgical neck of L humerus as per EMR. Maintained non WB throughout PT Eval. Pt /90, HR 75, O2 95% on room air.
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
## 2017-09-24 NOTE — PROGRESS NOTE ADULT - PROBLEM SELECTOR PROBLEM 5
Essential hypertension Coronary artery disease involving coronary bypass graft of native heart without angina pectoris

## 2017-09-24 NOTE — PROGRESS NOTE ADULT - PROBLEM SELECTOR PROBLEM 4
Coronary artery disease involving coronary bypass graft of native heart without angina pectoris Hyponatremia

## 2017-09-24 NOTE — PHYSICAL THERAPY INITIAL EVALUATION ADULT - CRITERIA FOR SKILLED THERAPEUTIC INTERVENTIONS
anticipated discharge recommendation/impairments found/functional limitations in following categories/risk reduction/prevention/predicted duration of therapy intervention/therapy frequency/short term rehab/rehab potential

## 2017-09-24 NOTE — PHYSICAL THERAPY INITIAL EVALUATION ADULT - MODIFIED CLINICAL TEST OF SENSORY INTEGRATION IN BALANCE TEST
Barthel Index: Feeding Score _10__, Bathing Score 0___, Grooming Score _0__, Dressing Score 0___, Bowels Score ___10, Bladder Score _10__, Toilet Score __0_, Transfers Score _10__, Mobility Score _0__, Stairs Score 0___,     Total Score _45__
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
## 2017-09-24 NOTE — PHYSICAL THERAPY INITIAL EVALUATION ADULT - RANGE OF MOTION EXAMINATION, REHAB EVAL
Right UE ROM was WNL (within normal limits)/unable to assess L upper extremity secondary to Fx of humerus./bilateral lower extremity was ROM was WNL (within normal limits)

## 2017-09-25 ENCOUNTER — TRANSCRIPTION ENCOUNTER (OUTPATIENT)
Age: 82
End: 2017-09-25

## 2017-09-25 VITALS
HEART RATE: 73 BPM | OXYGEN SATURATION: 97 % | RESPIRATION RATE: 18 BRPM | TEMPERATURE: 99 F | SYSTOLIC BLOOD PRESSURE: 139 MMHG | DIASTOLIC BLOOD PRESSURE: 69 MMHG

## 2017-09-25 LAB
ANION GAP SERPL CALC-SCNC: 12 MMOL/L — SIGNIFICANT CHANGE UP (ref 5–17)
BUN SERPL-MCNC: 19 MG/DL — SIGNIFICANT CHANGE UP (ref 7–23)
CALCIUM SERPL-MCNC: 8.6 MG/DL — SIGNIFICANT CHANGE UP (ref 8.5–10.1)
CHLORIDE SERPL-SCNC: 94 MMOL/L — LOW (ref 96–108)
CO2 SERPL-SCNC: 27 MMOL/L — SIGNIFICANT CHANGE UP (ref 22–31)
CREAT SERPL-MCNC: 0.87 MG/DL — SIGNIFICANT CHANGE UP (ref 0.5–1.3)
CULTURE RESULTS: SIGNIFICANT CHANGE UP
GLUCOSE SERPL-MCNC: 156 MG/DL — HIGH (ref 70–99)
HCT VFR BLD CALC: 32.6 % — LOW (ref 34.5–45)
HGB BLD-MCNC: 10.7 G/DL — LOW (ref 11.5–15.5)
MCHC RBC-ENTMCNC: 29.3 PG — SIGNIFICANT CHANGE UP (ref 27–34)
MCHC RBC-ENTMCNC: 32.7 GM/DL — SIGNIFICANT CHANGE UP (ref 32–36)
MCV RBC AUTO: 89.6 FL — SIGNIFICANT CHANGE UP (ref 80–100)
PLATELET # BLD AUTO: 370 K/UL — SIGNIFICANT CHANGE UP (ref 150–400)
POTASSIUM SERPL-MCNC: 3.2 MMOL/L — LOW (ref 3.5–5.3)
POTASSIUM SERPL-SCNC: 3.2 MMOL/L — LOW (ref 3.5–5.3)
RBC # BLD: 3.64 M/UL — LOW (ref 3.8–5.2)
RBC # FLD: 14.9 % — SIGNIFICANT CHANGE UP (ref 11–15)
SODIUM SERPL-SCNC: 133 MMOL/L — LOW (ref 135–145)
SPECIMEN SOURCE: SIGNIFICANT CHANGE UP
T PALLIDUM AB TITR SER: NEGATIVE — SIGNIFICANT CHANGE UP
WBC # BLD: 6.8 K/UL — SIGNIFICANT CHANGE UP (ref 3.8–10.5)
WBC # FLD AUTO: 6.8 K/UL — SIGNIFICANT CHANGE UP (ref 3.8–10.5)

## 2017-09-25 PROCEDURE — 99239 HOSP IP/OBS DSCHRG MGMT >30: CPT

## 2017-09-25 PROCEDURE — 90792 PSYCH DIAG EVAL W/MED SRVCS: CPT

## 2017-09-25 RX ORDER — TIOTROPIUM BROMIDE 18 UG/1
1 CAPSULE ORAL; RESPIRATORY (INHALATION)
Qty: 0 | Refills: 0 | COMMUNITY
Start: 2017-09-25

## 2017-09-25 RX ORDER — ASPIRIN/CALCIUM CARB/MAGNESIUM 324 MG
1 TABLET ORAL
Qty: 0 | Refills: 0 | DISCHARGE
Start: 2017-09-25

## 2017-09-25 RX ORDER — PANTOPRAZOLE SODIUM 20 MG/1
1 TABLET, DELAYED RELEASE ORAL
Qty: 0 | Refills: 0 | DISCHARGE
Start: 2017-09-25

## 2017-09-25 RX ORDER — OLANZAPINE 15 MG/1
5 TABLET, FILM COATED ORAL
Qty: 0 | Refills: 0 | COMMUNITY
Start: 2017-09-25

## 2017-09-25 RX ORDER — ACETAMINOPHEN 500 MG
2 TABLET ORAL
Qty: 0 | Refills: 0 | DISCHARGE
Start: 2017-09-25

## 2017-09-25 RX ORDER — POTASSIUM CHLORIDE 20 MEQ
40 PACKET (EA) ORAL ONCE
Qty: 0 | Refills: 0 | Status: COMPLETED | OUTPATIENT
Start: 2017-09-25 | End: 2017-09-25

## 2017-09-25 RX ORDER — FUROSEMIDE 40 MG
1 TABLET ORAL
Qty: 0 | Refills: 0 | DISCHARGE
Start: 2017-09-25

## 2017-09-25 RX ORDER — DOCUSATE SODIUM 100 MG
1 CAPSULE ORAL
Qty: 0 | Refills: 0 | COMMUNITY
Start: 2017-09-25

## 2017-09-25 RX ORDER — MECLIZINE HCL 12.5 MG
1 TABLET ORAL
Qty: 0 | Refills: 0 | COMMUNITY
Start: 2017-09-25

## 2017-09-25 RX ORDER — ZOLPIDEM TARTRATE 10 MG/1
1 TABLET ORAL
Qty: 0 | Refills: 0 | COMMUNITY
Start: 2017-09-25

## 2017-09-25 RX ORDER — ALBUTEROL 90 UG/1
1 AEROSOL, METERED ORAL
Qty: 0 | Refills: 0 | DISCHARGE
Start: 2017-09-25

## 2017-09-25 RX ORDER — METOPROLOL TARTRATE 50 MG
1 TABLET ORAL
Qty: 0 | Refills: 0 | DISCHARGE
Start: 2017-09-25

## 2017-09-25 RX ORDER — ATORVASTATIN CALCIUM 80 MG/1
1 TABLET, FILM COATED ORAL
Qty: 0 | Refills: 0 | DISCHARGE
Start: 2017-09-25

## 2017-09-25 RX ORDER — FLUOXETINE HCL 10 MG
1 CAPSULE ORAL
Qty: 0 | Refills: 0 | DISCHARGE
Start: 2017-09-25

## 2017-09-25 RX ORDER — OLANZAPINE 15 MG/1
1 TABLET, FILM COATED ORAL
Qty: 0 | Refills: 0 | COMMUNITY
Start: 2017-09-25

## 2017-09-25 RX ORDER — LISINOPRIL 2.5 MG/1
1 TABLET ORAL
Qty: 0 | Refills: 0 | DISCHARGE
Start: 2017-09-25

## 2017-09-25 RX ADMIN — Medication 81 MILLIGRAM(S): at 11:47

## 2017-09-25 RX ADMIN — LISINOPRIL 20 MILLIGRAM(S): 2.5 TABLET ORAL at 07:00

## 2017-09-25 RX ADMIN — HEPARIN SODIUM 5000 UNIT(S): 5000 INJECTION INTRAVENOUS; SUBCUTANEOUS at 07:00

## 2017-09-25 RX ADMIN — Medication 40 MILLIGRAM(S): at 07:00

## 2017-09-25 RX ADMIN — Medication 40 MILLIGRAM(S): at 11:47

## 2017-09-25 RX ADMIN — Medication 50 MILLIGRAM(S): at 07:00

## 2017-09-25 RX ADMIN — PANTOPRAZOLE SODIUM 40 MILLIGRAM(S): 20 TABLET, DELAYED RELEASE ORAL at 07:00

## 2017-09-25 RX ADMIN — Medication 40 MILLIEQUIVALENT(S): at 11:47

## 2017-09-25 NOTE — DISCHARGE NOTE ADULT - CONDITION (STATED IN TERMS THAT PERMIT A SPECIFIC MEASURABLE COMPARISON WITH CONDITION ON ADMISSION):
75 minutes spent in direct patient care including physical examination, discharge instructions , medication instructions and follow up, patient verbalized understanding and agreed.
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
## 2017-09-25 NOTE — DISCHARGE NOTE ADULT - PROVIDER TOKENS
TOKEN:'[id number]',FREE:[LAST:[PMD],PHONE:[(   )    -],FAX:[(   )    -]] TOKEN:'[id number]',FREE:[LAST:[PMD],PHONE:[(   )    -],FAX:[(   )    -]],TOKEN:'[id number]'

## 2017-09-25 NOTE — OCCUPATIONAL THERAPY INITIAL EVALUATION ADULT - RANGE OF MOTION EXAMINATION, UPPER EXTREMITY
Right UE Active ROM was WFL (within functional limits)/Right UE Passive ROM was WFL  (within functional limits)/ROM  is diminished in  left elbow, wrist and digist
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
## 2017-09-25 NOTE — DISCHARGE NOTE ADULT - CARE PLAN
Principal Discharge DX:	Pain of left upper extremity  Goal:	Follow up with orthopedist  Instructions for follow-up, activity and diet:	Follow up with orthopedist  Secondary Diagnosis:	Cognitive decline  Goal:	Continue home meds  Instructions for follow-up, activity and diet:	Follow up with PMD  Secondary Diagnosis:	Coronary artery disease involving coronary bypass graft of native heart without angina pectoris  Instructions for follow-up, activity and diet:	Continue home meds  Follow up with PMD  Secondary Diagnosis:	Frequent falls  Instructions for follow-up, activity and diet:	Prevention  Secondary Diagnosis:	Debility  Instructions for follow-up, activity and diet:	KADIE  Secondary Diagnosis:	Chronic congestive heart failure, unspecified congestive heart failure type  Instructions for follow-up, activity and diet:	Continue home meds  Follow up with PMD  Secondary Diagnosis:	Essential hypertension  Instructions for follow-up, activity and diet:	Continue home blood pressure medications  Follow up with PCP  Low-sodium diet  AHA Recipes - [email or web address] Principal Discharge DX:	Pain of left upper extremity  Goal:	Follow up with orthopedist  Instructions for follow-up, activity and diet:	Follow up with orthopedist after rehab  Continue using L arm non-weight bearing sling  Secondary Diagnosis:	Cognitive decline  Goal:	Continue home meds  Instructions for follow-up, activity and diet:	Follow up with PMD  Secondary Diagnosis:	Coronary artery disease involving coronary bypass graft of native heart without angina pectoris  Instructions for follow-up, activity and diet:	Continue home meds  Follow up with PMD  Secondary Diagnosis:	Frequent falls  Instructions for follow-up, activity and diet:	Prevention  Secondary Diagnosis:	Debility  Instructions for follow-up, activity and diet:	KADIE  Secondary Diagnosis:	Chronic congestive heart failure, unspecified congestive heart failure type  Instructions for follow-up, activity and diet:	Continue home meds  Follow up with PMD  Secondary Diagnosis:	Essential hypertension  Instructions for follow-up, activity and diet:	Continue home blood pressure medications  Follow up with PCP  Low-sodium diet  AHA Recipes - [email or web address] Principal Discharge DX:	Pain of left upper extremity  Goal:	Follow up with orthopedist  Instructions for follow-up, activity and diet:	sec to left humeral fracture last month.  Follow up with orthopedist Dr. Flower x 1 week   L arm non-weight bearing and keep sling on  analgesics prn  Secondary Diagnosis:	Cognitive decline  Goal:	Continue home meds  Instructions for follow-up, activity and diet:	Please follow up with your primary care physician regarding your hospitalization  Consider getting neurology referral from PCP  Secondary Diagnosis:	Coronary artery disease involving coronary bypass graft of native heart without angina pectoris  Instructions for follow-up, activity and diet:	Continue home meds  Follow up with PMD  Secondary Diagnosis:	Frequent falls  Instructions for follow-up, activity and diet:	Prevention  Physical therapy  Secondary Diagnosis:	Debility  Goal:	get muscle strength and good balance  Instructions for follow-up, activity and diet:	Physical therapy  Secondary Diagnosis:	Chronic congestive heart failure, unspecified congestive heart failure type  Instructions for follow-up, activity and diet:	Continue home meds  Follow up with PMD  Secondary Diagnosis:	Essential hypertension  Instructions for follow-up, activity and diet:	Continue home blood pressure medications  Follow up with PCP  Low-sodium diet  AHA Recipes - [email or web address]

## 2017-09-25 NOTE — BEHAVIORAL HEALTH ASSESSMENT NOTE - ADDITIONAL DETAILS / COMMENTS
she refused an MMSE, but deficits noted in delayed and immediate recall as observed during general assessment
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
## 2017-09-25 NOTE — OCCUPATIONAL THERAPY INITIAL EVALUATION ADULT - GENERAL OBSERVATIONS, REHAB EVAL
Pt was encountered supine in bed, AA&Ox4, cooperative & followed commands. Left UE  is supported in sling , NWB on LUE was reviewed and maintained; pt ir right hand dominant.

## 2017-09-25 NOTE — DISCHARGE NOTE ADULT - CARE PROVIDERS DIRECT ADDRESSES
,DirectAddress_Unknown,DirectAddress_Unknown ,DirectAddress_Unknown,DirectAddress_Unknown,[email or web address]

## 2017-09-25 NOTE — OCCUPATIONAL THERAPY INITIAL EVALUATION ADULT - LIVES WITH, PROFILE
in a private house with her daughter; pt  has 5 steps to enter with hand rails and 2 flight of stairs inside with rail on1 side  ; pt  bathroom  has a tub/shower combination  and is equipped with  standard toilet, shower bench, but no grab bars.

## 2017-09-25 NOTE — OCCUPATIONAL THERAPY INITIAL EVALUATION ADULT - ADDITIONAL COMMENTS
Prior to admission, Pt was functioning in her roles, self sufficient & ambulating independently with a straight cane; presently, pt needs assistance with self care tasks and functional  mobility; pt shows diminished  protective response in standing ,when is displaced. The scale below depicts a picture of the pt's current level of functioning. Barthel Index: Feeding Score____10__, Bathing Score____10__, Grooming Score____5_, Dressing Score___5__, Bowel Score___5__, Bladder Score____5__, Toilet Score____5_, Transfer Score___10___, Mobility Score___10__, Stairs Score___0__, Total Score__55/100___.

## 2017-09-25 NOTE — DISCHARGE NOTE ADULT - MEDICATION SUMMARY - MEDICATIONS TO TAKE
I will START or STAY ON the medications listed below when I get home from the hospital:    aspirin 81 mg oral delayed release tablet  -- 1 tab(s) by mouth once a day  -- Indication: For Preventive measure    oxyCODONE-acetaminophen 5mg-325mg oral tablet  -- 1 tab(s) by mouth 3 times a day MDD:3  -- Caution federal law prohibits the transfer of this drug to any person other  than the person for whom it was prescribed.  May cause drowsiness.  Alcohol may intensify this effect.  Use care when operating dangerous machinery.  This prescription cannot be refilled.  This product contains acetaminophen.  Do not use  with any other product containing acetaminophen to prevent possible liver damage.  Using more of this medication than prescribed may cause serious breathing problems.    -- Indication: For Pain of left upper extremity    acetaminophen 325 mg oral tablet  -- 2 tab(s) by mouth every 6 hours, As needed, Mild Pain (1 - 3)  -- Indication: For Pain of left upper extremity    lisinopril 20 mg oral tablet  -- 1 tab(s) by mouth once a day  -- Indication: For HTN    FLUoxetine 40 mg oral capsule  -- 1 cap(s) by mouth once a day  -- Indication: For Antidepressant    meclizine 25 mg oral tablet  -- 1 tab(s) by mouth 3 times a day, As needed, Dizziness  -- Indication: For Antiemetic    atorvastatin 20 mg oral tablet  -- 1 tab(s) by mouth once a day (at bedtime)  -- Indication: For HLD    OLANZapine 2.5 mg oral tablet  -- 1 tab(s) by mouth every 6 hours, As needed, agiatation  -- Indication: For Antipsych    OLANZapine 10 mg intramuscular injection  -- 5 milligram(s) intramuscular every 6 hours, As needed, Severe agitation  -- Indication: For Antipsych    zolpidem 5 mg oral tablet  -- 1 tab(s) by mouth once a day (at bedtime), As needed, Insomnia  -- Indication: For Insomnia, unspecified type    metoprolol tartrate 50 mg oral tablet  -- 1 tab(s) by mouth 2 times a day  -- Indication: For HTN    albuterol 90 mcg/inh inhalation aerosol  -- 1 puff(s) inhaled every 4 hours, As needed, Shortness of Breath and/or Wheezing  -- Indication: For Asthma    tiotropium 18 mcg inhalation capsule  -- 1 cap(s) inhaled once a day, As needed, SOB/wheezing  -- Indication: For Asthma    furosemide 40 mg oral tablet  -- 1 tab(s) by mouth once a day  -- Indication: For ChF    docusate sodium 100 mg oral capsule  -- 1 cap(s) by mouth 3 times a day, As needed, Constipation  -- Indication: For Constipation    K-Tab 10 mEq oral tablet, extended release  -- 1 tab(s) by mouth once a day  -- It is very important that you take or use this exactly as directed.  Do not skip doses or discontinue unless directed by your doctor.  Medication should be taken with plenty of water.  Take with food or milk.    -- Indication: For Supplement    pantoprazole 40 mg oral delayed release tablet  -- 1 tab(s) by mouth once a day (before a meal)  -- Indication: For GERD I will START or STAY ON the medications listed below when I get home from the hospital:    aspirin 81 mg oral delayed release tablet  -- 1 tab(s) by mouth once a day  -- Indication: For Preventive measure    oxyCODONE-acetaminophen 5mg-325mg oral tablet  -- 1 tab(s) by mouth 3 times a day MDD:3  -- Caution federal law prohibits the transfer of this drug to any person other  than the person for whom it was prescribed.  May cause drowsiness.  Alcohol may intensify this effect.  Use care when operating dangerous machinery.  This prescription cannot be refilled.  This product contains acetaminophen.  Do not use  with any other product containing acetaminophen to prevent possible liver damage.  Using more of this medication than prescribed may cause serious breathing problems.    -- Indication: For Pain of left upper extremity    acetaminophen 325 mg oral tablet  -- 2 tab(s) by mouth every 6 hours, As needed, Mild Pain (1 - 3)  -- Indication: For Pain of left upper extremity    lisinopril 20 mg oral tablet  -- 1 tab(s) by mouth once a day  -- Indication: For HTN    FLUoxetine 40 mg oral capsule  -- 1 cap(s) by mouth once a day  -- Indication: For Antidepressant    meclizine 25 mg oral tablet  -- 1 tab(s) by mouth 3 times a day, As needed, Dizziness  -- Indication: For Dizziness    atorvastatin 20 mg oral tablet  -- 1 tab(s) by mouth once a day (at bedtime)  -- Indication: For HLD    zolpidem 5 mg oral tablet  -- 1 tab(s) by mouth once a day (at bedtime), As needed, Insomnia  -- Indication: For Insomnia, unspecified type    metoprolol tartrate 50 mg oral tablet  -- 1 tab(s) by mouth 2 times a day  -- Indication: For HTN    albuterol 90 mcg/inh inhalation aerosol  -- 1 puff(s) inhaled every 4 hours, As needed, Shortness of Breath and/or Wheezing  -- Indication: For Asthma    tiotropium 18 mcg inhalation capsule  -- 1 cap(s) inhaled once a day, As needed, SOB/wheezing  -- Indication: For Asthma    furosemide 40 mg oral tablet  -- 1 tab(s) by mouth once a day  -- Indication: For ChF    docusate sodium 100 mg oral capsule  -- 1 cap(s) by mouth 3 times a day, As needed, Constipation  -- Indication: For Constipation    K-Tab 10 mEq oral tablet, extended release  -- 1 tab(s) by mouth once a day  -- It is very important that you take or use this exactly as directed.  Do not skip doses or discontinue unless directed by your doctor.  Medication should be taken with plenty of water.  Take with food or milk.    -- Indication: For Supplement    pantoprazole 40 mg oral delayed release tablet  -- 1 tab(s) by mouth once a day (before a meal)  -- Indication: For GERD

## 2017-09-25 NOTE — DISCHARGE NOTE ADULT - CARE PROVIDER_API CALL
Anila Vinson (CAROL), Psychiatry  [address] [place] [address]  Phone: [phone]  Fax: [phone]    PMD,   Phone: (   )    -  Fax: (   )    - Anila Vinson (CAROL), Psychiatry  [address] [place] [address]  Phone: [phone]  Fax: [phone]    PMD,   Phone: (   )    -  Fax: (   )    -    Rafa Gastelum), Orthopaedic Surgery  [address] [place] [address]  Phone: [phone]  Fax: [phone]

## 2017-09-25 NOTE — OCCUPATIONAL THERAPY INITIAL EVALUATION ADULT - PRECAUTIONS/LIMITATIONS, REHAB EVAL
fall precautions/diminished reaction time due to age related changes; pt  has history  of multiple comorbidities

## 2017-09-25 NOTE — DISCHARGE NOTE ADULT - ADDITIONAL INSTRUCTIONS
Follow with PCP x 3 days  Consider getting neurologist referral from PCP for evaluation fro dementia.  Follow with Ortho/Dr. rollins x 1 week

## 2017-09-25 NOTE — DISCHARGE NOTE ADULT - PLAN OF CARE
Follow up with orthopedist Continue home meds Follow up with PMD Continue home meds  Follow up with PMD Prevention KADIE Continue home blood pressure medications  Follow up with PCP  Low-sodium diet  AHA Recipes - [email or web address] Follow up with orthopedist after rehab  Continue using L arm non-weight bearing sling sec to left humeral fracture last month.  Follow up with orthopedist Dr. Flower x 1 week   L arm non-weight bearing and keep sling on  analgesics prn Please follow up with your primary care physician regarding your hospitalization  Consider getting neurology referral from PCP Prevention  Physical therapy get muscle strength and good balance Physical therapy

## 2017-09-25 NOTE — BEHAVIORAL HEALTH ASSESSMENT NOTE - NSBHCONSULTFOLLOWAFTERCARE_PSY_A_CORE FT
Psychiatrically cleared for disposition planning. Can consult psychiatry at rehab for antidepressant review.

## 2017-09-25 NOTE — BEHAVIORAL HEALTH ASSESSMENT NOTE - OTHER
not assessed daughter lives with her worsening cognition? some deficits noted did not assess variable Telepsychiatry- Dr Schmidt

## 2017-09-25 NOTE — OCCUPATIONAL THERAPY INITIAL EVALUATION ADULT - SOCIAL CONCERNS
Complex psychosocial needs/coping issues/Pt voiced concerns about the fracture in her left shoulder  and the inability to  use her hand.

## 2017-09-25 NOTE — OCCUPATIONAL THERAPY INITIAL EVALUATION ADULT - PLANNED THERAPY INTERVENTIONS, OT EVAL
fine motor coordination training/joint mobilization/neuromuscular re-education/ROM/energy conservation techniques/IADL retraining/bed mobility training/massage/parent/caregiver training.../strengthening/stretching/balance training/motor coordination training/ADL retraining/transfer training

## 2017-09-25 NOTE — DISCHARGE NOTE ADULT - SECONDARY DIAGNOSIS.
Cognitive decline Coronary artery disease involving coronary bypass graft of native heart without angina pectoris Frequent falls Debility Chronic congestive heart failure, unspecified congestive heart failure type Essential hypertension
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
## 2017-09-27 DIAGNOSIS — J45.20 MILD INTERMITTENT ASTHMA, UNCOMPLICATED: ICD-10-CM

## 2017-09-27 DIAGNOSIS — M79.622 PAIN IN LEFT UPPER ARM: ICD-10-CM

## 2017-09-27 DIAGNOSIS — Z79.52 LONG TERM (CURRENT) USE OF SYSTEMIC STEROIDS: ICD-10-CM

## 2017-09-27 DIAGNOSIS — R45.1 RESTLESSNESS AND AGITATION: ICD-10-CM
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
## 2017-09-27 DIAGNOSIS — I11.0 HYPERTENSIVE HEART DISEASE WITH HEART FAILURE: ICD-10-CM

## 2017-09-27 DIAGNOSIS — Z91.011 ALLERGY TO MILK PRODUCTS: ICD-10-CM

## 2017-09-27 DIAGNOSIS — F03.90 UNSPECIFIED DEMENTIA, UNSPECIFIED SEVERITY, WITHOUT BEHAVIORAL DISTURBANCE, PSYCHOTIC DISTURBANCE, MOOD DISTURBANCE, AND ANXIETY: ICD-10-CM

## 2017-09-27 DIAGNOSIS — Z95.1 PRESENCE OF AORTOCORONARY BYPASS GRAFT: ICD-10-CM

## 2017-09-27 DIAGNOSIS — Z96.653 PRESENCE OF ARTIFICIAL KNEE JOINT, BILATERAL: ICD-10-CM

## 2017-09-27 DIAGNOSIS — Z79.82 LONG TERM (CURRENT) USE OF ASPIRIN: ICD-10-CM

## 2017-09-27 DIAGNOSIS — G47.00 INSOMNIA, UNSPECIFIED: ICD-10-CM

## 2017-09-27 DIAGNOSIS — Z28.21 IMMUNIZATION NOT CARRIED OUT BECAUSE OF PATIENT REFUSAL: ICD-10-CM

## 2017-09-27 DIAGNOSIS — K21.9 GASTRO-ESOPHAGEAL REFLUX DISEASE WITHOUT ESOPHAGITIS: ICD-10-CM

## 2017-09-27 DIAGNOSIS — Z91.81 HISTORY OF FALLING: ICD-10-CM

## 2017-09-27 DIAGNOSIS — I50.32 CHRONIC DIASTOLIC (CONGESTIVE) HEART FAILURE: ICD-10-CM

## 2017-09-27 DIAGNOSIS — Z53.29 PROCEDURE AND TREATMENT NOT CARRIED OUT BECAUSE OF PATIENT'S DECISION FOR OTHER REASONS: ICD-10-CM

## 2017-09-27 DIAGNOSIS — G89.21 CHRONIC PAIN DUE TO TRAUMA: ICD-10-CM

## 2017-09-27 DIAGNOSIS — Z88.9 ALLERGY STATUS TO UNSPECIFIED DRUGS, MEDICAMENTS AND BIOLOGICAL SUBSTANCES: ICD-10-CM

## 2017-09-27 DIAGNOSIS — E87.0 HYPEROSMOLALITY AND HYPERNATREMIA: ICD-10-CM
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
## 2017-09-27 DIAGNOSIS — Z91.14 PATIENT'S OTHER NONCOMPLIANCE WITH MEDICATION REGIMEN: ICD-10-CM

## 2017-09-27 DIAGNOSIS — Z63.9 PROBLEM RELATED TO PRIMARY SUPPORT GROUP, UNSPECIFIED: ICD-10-CM

## 2017-09-27 DIAGNOSIS — I25.10 ATHEROSCLEROTIC HEART DISEASE OF NATIVE CORONARY ARTERY WITHOUT ANGINA PECTORIS: ICD-10-CM

## 2017-09-27 DIAGNOSIS — S42.292S: ICD-10-CM

## 2017-09-27 SDOH — SOCIAL STABILITY - SOCIAL INSECURITY: PROBLEM RELATED TO PRIMARY SUPPORT GROUP, UNSPECIFIED: Z63.9

## 2017-11-05 ENCOUNTER — INPATIENT (INPATIENT)
Facility: HOSPITAL | Age: 82
LOS: 8 days | Discharge: NURSING, REHAB OR LONG-TERM CARE | End: 2017-11-14
Attending: INTERNAL MEDICINE | Admitting: INTERNAL MEDICINE
Payer: MEDICARE

## 2017-11-05 VITALS
HEART RATE: 104 BPM | RESPIRATION RATE: 22 BRPM | SYSTOLIC BLOOD PRESSURE: 168 MMHG | DIASTOLIC BLOOD PRESSURE: 83 MMHG | OXYGEN SATURATION: 94 % | WEIGHT: 119.93 LBS | HEIGHT: 63 IN

## 2017-11-05 DIAGNOSIS — Z95.1 PRESENCE OF AORTOCORONARY BYPASS GRAFT: Chronic | ICD-10-CM

## 2017-11-05 DIAGNOSIS — Z96.653 PRESENCE OF ARTIFICIAL KNEE JOINT, BILATERAL: Chronic | ICD-10-CM

## 2017-11-05 LAB
ALBUMIN SERPL ELPH-MCNC: 3.7 G/DL — SIGNIFICANT CHANGE UP (ref 3.3–5)
ALP SERPL-CCNC: 119 U/L — SIGNIFICANT CHANGE UP (ref 40–120)
ALT FLD-CCNC: 35 U/L — SIGNIFICANT CHANGE UP (ref 12–78)
ANION GAP SERPL CALC-SCNC: 12 MMOL/L — SIGNIFICANT CHANGE UP (ref 5–17)
APPEARANCE UR: CLEAR — SIGNIFICANT CHANGE UP
APTT BLD: 28.7 SEC — SIGNIFICANT CHANGE UP (ref 27.5–37.4)
AST SERPL-CCNC: 42 U/L — HIGH (ref 15–37)
BACTERIA # UR AUTO: ABNORMAL
BILIRUB SERPL-MCNC: 1.2 MG/DL — SIGNIFICANT CHANGE UP (ref 0.2–1.2)
BILIRUB UR-MCNC: NEGATIVE — SIGNIFICANT CHANGE UP
BLD GP AB SCN SERPL QL: SIGNIFICANT CHANGE UP
BUN SERPL-MCNC: 8 MG/DL — SIGNIFICANT CHANGE UP (ref 7–23)
CALCIUM SERPL-MCNC: 8.6 MG/DL — SIGNIFICANT CHANGE UP (ref 8.5–10.1)
CHLORIDE SERPL-SCNC: 71 MMOL/L — LOW (ref 96–108)
CO2 SERPL-SCNC: 30 MMOL/L — SIGNIFICANT CHANGE UP (ref 22–31)
COLOR SPEC: YELLOW — SIGNIFICANT CHANGE UP
CREAT SERPL-MCNC: 0.52 MG/DL — SIGNIFICANT CHANGE UP (ref 0.5–1.3)
DIFF PNL FLD: ABNORMAL
EPI CELLS # UR: SIGNIFICANT CHANGE UP
GLUCOSE SERPL-MCNC: 119 MG/DL — HIGH (ref 70–99)
GLUCOSE UR QL: NEGATIVE MG/DL — SIGNIFICANT CHANGE UP
HCT VFR BLD CALC: 29.7 % — LOW (ref 34.5–45)
HGB BLD-MCNC: 10.4 G/DL — LOW (ref 11.5–15.5)
INR BLD: 0.99 RATIO — SIGNIFICANT CHANGE UP (ref 0.88–1.16)
KETONES UR-MCNC: ABNORMAL
LEUKOCYTE ESTERASE UR-ACNC: NEGATIVE — SIGNIFICANT CHANGE UP
LYMPHOCYTES # BLD AUTO: 5 % — LOW (ref 13–44)
MAGNESIUM SERPL-MCNC: 1.7 MG/DL — SIGNIFICANT CHANGE UP (ref 1.6–2.6)
MCHC RBC-ENTMCNC: 28.6 PG — SIGNIFICANT CHANGE UP (ref 27–34)
MCHC RBC-ENTMCNC: 35 GM/DL — SIGNIFICANT CHANGE UP (ref 32–36)
MCV RBC AUTO: 81.6 FL — SIGNIFICANT CHANGE UP (ref 80–100)
MONOCYTES NFR BLD AUTO: 8 % — SIGNIFICANT CHANGE UP (ref 2–14)
NEUTROPHILS NFR BLD AUTO: 87 % — HIGH (ref 43–77)
NITRITE UR-MCNC: NEGATIVE — SIGNIFICANT CHANGE UP
PH UR: 7 — SIGNIFICANT CHANGE UP (ref 5–8)
PLAT MORPH BLD: NORMAL — SIGNIFICANT CHANGE UP
PLATELET # BLD AUTO: 305 K/UL — SIGNIFICANT CHANGE UP (ref 150–400)
POLYCHROMASIA BLD QL SMEAR: SLIGHT — SIGNIFICANT CHANGE UP
POTASSIUM SERPL-MCNC: 3 MMOL/L — LOW (ref 3.5–5.3)
POTASSIUM SERPL-SCNC: 3 MMOL/L — LOW (ref 3.5–5.3)
PROT SERPL-MCNC: 7.4 GM/DL — SIGNIFICANT CHANGE UP (ref 6–8.3)
PROT UR-MCNC: 100 MG/DL
PROTHROM AB SERPL-ACNC: 10.8 SEC — SIGNIFICANT CHANGE UP (ref 9.8–12.7)
RBC # BLD: 3.64 M/UL — LOW (ref 3.8–5.2)
RBC # FLD: 13.2 % — SIGNIFICANT CHANGE UP (ref 11–15)
RBC BLD AUTO: SIGNIFICANT CHANGE UP
RBC CASTS # UR COMP ASSIST: SIGNIFICANT CHANGE UP /HPF (ref 0–4)
SODIUM SERPL-SCNC: 113 MMOL/L — CRITICAL LOW (ref 135–145)
SP GR SPEC: 1.01 — SIGNIFICANT CHANGE UP (ref 1.01–1.02)
UROBILINOGEN FLD QL: 8 MG/DL
WBC # BLD: 11.4 K/UL — HIGH (ref 3.8–10.5)
WBC # FLD AUTO: 11.4 K/UL — HIGH (ref 3.8–10.5)
WBC UR QL: SIGNIFICANT CHANGE UP

## 2017-11-05 PROCEDURE — 73610 X-RAY EXAM OF ANKLE: CPT | Mod: 26,LT

## 2017-11-05 PROCEDURE — 72125 CT NECK SPINE W/O DYE: CPT | Mod: 26

## 2017-11-05 PROCEDURE — 76377 3D RENDER W/INTRP POSTPROCES: CPT | Mod: 26,76

## 2017-11-05 PROCEDURE — 71010: CPT | Mod: 26

## 2017-11-05 PROCEDURE — 73590 X-RAY EXAM OF LOWER LEG: CPT | Mod: 26,LT

## 2017-11-05 PROCEDURE — 70486 CT MAXILLOFACIAL W/O DYE: CPT | Mod: 26

## 2017-11-05 PROCEDURE — 70450 CT HEAD/BRAIN W/O DYE: CPT | Mod: 26

## 2017-11-05 PROCEDURE — 73030 X-RAY EXAM OF SHOULDER: CPT | Mod: 26,LT

## 2017-11-05 PROCEDURE — 99285 EMERGENCY DEPT VISIT HI MDM: CPT

## 2017-11-05 PROCEDURE — 73502 X-RAY EXAM HIP UNI 2-3 VIEWS: CPT | Mod: 26,LT

## 2017-11-05 PROCEDURE — 73552 X-RAY EXAM OF FEMUR 2/>: CPT | Mod: 26,LT

## 2017-11-05 PROCEDURE — 73620 X-RAY EXAM OF FOOT: CPT | Mod: 26,LT

## 2017-11-05 RX ORDER — DEXTROSE MONOHYDRATE, SODIUM CHLORIDE, AND POTASSIUM CHLORIDE 50; .745; 4.5 G/1000ML; G/1000ML; G/1000ML
1000 INJECTION, SOLUTION INTRAVENOUS
Qty: 0 | Refills: 0 | Status: DISCONTINUED | OUTPATIENT
Start: 2017-11-05 | End: 2017-11-07

## 2017-11-05 RX ORDER — SENNA PLUS 8.6 MG/1
2 TABLET ORAL AT BEDTIME
Qty: 0 | Refills: 0 | Status: DISCONTINUED | OUTPATIENT
Start: 2017-11-05 | End: 2017-11-14

## 2017-11-05 RX ORDER — TIOTROPIUM BROMIDE 18 UG/1
1 CAPSULE ORAL; RESPIRATORY (INHALATION) DAILY
Qty: 0 | Refills: 0 | Status: DISCONTINUED | OUTPATIENT
Start: 2017-11-05 | End: 2017-11-14

## 2017-11-05 RX ORDER — FLUOXETINE HCL 10 MG
40 CAPSULE ORAL DAILY
Qty: 0 | Refills: 0 | Status: DISCONTINUED | OUTPATIENT
Start: 2017-11-05 | End: 2017-11-14

## 2017-11-05 RX ORDER — LISINOPRIL 2.5 MG/1
20 TABLET ORAL DAILY
Qty: 0 | Refills: 0 | Status: DISCONTINUED | OUTPATIENT
Start: 2017-11-05 | End: 2017-11-14

## 2017-11-05 RX ORDER — ASPIRIN/CALCIUM CARB/MAGNESIUM 324 MG
81 TABLET ORAL DAILY
Qty: 0 | Refills: 0 | Status: DISCONTINUED | OUTPATIENT
Start: 2017-11-05 | End: 2017-11-14

## 2017-11-05 RX ORDER — MORPHINE SULFATE 50 MG/1
2 CAPSULE, EXTENDED RELEASE ORAL ONCE
Qty: 0 | Refills: 0 | Status: DISCONTINUED | OUTPATIENT
Start: 2017-11-05 | End: 2017-11-05

## 2017-11-05 RX ORDER — ZOLPIDEM TARTRATE 10 MG/1
5 TABLET ORAL AT BEDTIME
Qty: 0 | Refills: 0 | Status: DISCONTINUED | OUTPATIENT
Start: 2017-11-05 | End: 2017-11-12

## 2017-11-05 RX ORDER — POTASSIUM CHLORIDE 20 MEQ
10 PACKET (EA) ORAL
Qty: 0 | Refills: 0 | Status: COMPLETED | OUTPATIENT
Start: 2017-11-05 | End: 2017-11-05

## 2017-11-05 RX ORDER — MORPHINE SULFATE 50 MG/1
2 CAPSULE, EXTENDED RELEASE ORAL
Qty: 0 | Refills: 0 | Status: DISCONTINUED | OUTPATIENT
Start: 2017-11-05 | End: 2017-11-06

## 2017-11-05 RX ORDER — ALBUTEROL 90 UG/1
1 AEROSOL, METERED ORAL EVERY 6 HOURS
Qty: 0 | Refills: 0 | Status: DISCONTINUED | OUTPATIENT
Start: 2017-11-05 | End: 2017-11-14

## 2017-11-05 RX ORDER — HEPARIN SODIUM 5000 [USP'U]/ML
5000 INJECTION INTRAVENOUS; SUBCUTANEOUS EVERY 12 HOURS
Qty: 0 | Refills: 0 | Status: DISCONTINUED | OUTPATIENT
Start: 2017-11-05 | End: 2017-11-06

## 2017-11-05 RX ORDER — TRAMADOL HYDROCHLORIDE 50 MG/1
50 TABLET ORAL EVERY 4 HOURS
Qty: 0 | Refills: 0 | Status: DISCONTINUED | OUTPATIENT
Start: 2017-11-05 | End: 2017-11-11

## 2017-11-05 RX ORDER — ATORVASTATIN CALCIUM 80 MG/1
20 TABLET, FILM COATED ORAL AT BEDTIME
Qty: 0 | Refills: 0 | Status: DISCONTINUED | OUTPATIENT
Start: 2017-11-05 | End: 2017-11-14

## 2017-11-05 RX ORDER — SODIUM CHLORIDE 9 MG/ML
2000 INJECTION INTRAMUSCULAR; INTRAVENOUS; SUBCUTANEOUS ONCE
Qty: 0 | Refills: 0 | Status: COMPLETED | OUTPATIENT
Start: 2017-11-05 | End: 2017-11-05

## 2017-11-05 RX ORDER — METOPROLOL TARTRATE 50 MG
50 TABLET ORAL
Qty: 0 | Refills: 0 | Status: DISCONTINUED | OUTPATIENT
Start: 2017-11-05 | End: 2017-11-14

## 2017-11-05 RX ORDER — PANTOPRAZOLE SODIUM 20 MG/1
40 TABLET, DELAYED RELEASE ORAL
Qty: 0 | Refills: 0 | Status: DISCONTINUED | OUTPATIENT
Start: 2017-11-05 | End: 2017-11-14

## 2017-11-05 RX ADMIN — MORPHINE SULFATE 2 MILLIGRAM(S): 50 CAPSULE, EXTENDED RELEASE ORAL at 19:03

## 2017-11-05 RX ADMIN — MORPHINE SULFATE 2 MILLIGRAM(S): 50 CAPSULE, EXTENDED RELEASE ORAL at 19:53

## 2017-11-05 RX ADMIN — LISINOPRIL 20 MILLIGRAM(S): 2.5 TABLET ORAL at 20:56

## 2017-11-05 RX ADMIN — Medication 81 MILLIGRAM(S): at 22:40

## 2017-11-05 RX ADMIN — Medication 40 MILLIGRAM(S): at 22:40

## 2017-11-05 RX ADMIN — Medication 50 MILLIGRAM(S): at 20:29

## 2017-11-05 RX ADMIN — Medication 100 MILLIEQUIVALENT(S): at 19:05

## 2017-11-05 RX ADMIN — ATORVASTATIN CALCIUM 20 MILLIGRAM(S): 80 TABLET, FILM COATED ORAL at 22:40

## 2017-11-05 RX ADMIN — Medication 100 MILLIEQUIVALENT(S): at 22:03

## 2017-11-05 RX ADMIN — Medication 100 MILLIEQUIVALENT(S): at 20:00

## 2017-11-05 RX ADMIN — SODIUM CHLORIDE 1000 MILLILITER(S): 9 INJECTION INTRAMUSCULAR; INTRAVENOUS; SUBCUTANEOUS at 19:05

## 2017-11-05 RX ADMIN — MORPHINE SULFATE 2 MILLIGRAM(S): 50 CAPSULE, EXTENDED RELEASE ORAL at 18:05

## 2017-11-05 NOTE — ED ADULT TRIAGE NOTE - CHIEF COMPLAINT QUOTE
brought by ems for s/p fall couple days ago and need to be evaluated. pt has bruise on the left leg .ecchymosis on the bilateral face
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
## 2017-11-05 NOTE — ED PROVIDER NOTE - PHYSICAL EXAMINATION
Gen: Alert, NAD  Head: NC, AT   Eyes: PERRL, EOMI, normal lids/conjunctiva  ENT: normal hearing, patent oropharynx without erythema/exudate, uvula midline  Neck: supple, no tenderness, Trachea midline  Pulm: Bilateral BS, normal resp effort, no wheeze/stridor/retractions  CV: RRR, no M/R/G, 2+ radial and dp pulses bl, edema of bl le, left greater than right   Abd: soft, NT/ND, +BS, no hepatosplenomegaly  Mskel: left shoulder deformity, tenderness to palpation of the left upper arm and shoulder. hip stable and non tender. pelvis stable.   Skin: extensive bruising of the face and complete ecchymosis of the left leg.   Neuro: AAOx3, no sensory/motor deficits, CN 2-12 intact

## 2017-11-05 NOTE — ED PROVIDER NOTE - OBJECTIVE STATEMENT
Pertinent PMH/PSH/FHx/SHx and Review of Systems contained within:  83F hx cad, dementia, gerd, copd pw fall down stairs. Patient notes she fell down 1 stair today. unclear if there was loc. patient not on blood thinners. complains of shoulder pain on the left and leg pain on the left. no nausea, vomiting, cp, sob  Fh and Sh not otherwise contributory  ROS otherwise negative
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
## 2017-11-05 NOTE — ED ADULT NURSE NOTE - OBJECTIVE STATEMENT
patient received, BIBA stated fell a week ago, "a few times" patient stated fell 7 flight up on stairs down to the ground, unable to move left arm, left lower part of left leg is eccymotic, bruised up. middle part of face noted bruising. patient appears drowsy. is able to answer questions, alert and oriented x3. good bounding pulsation to left radial. no pulse noted on left foot.

## 2017-11-05 NOTE — ED PROVIDER NOTE - CARE PLAN
Principal Discharge DX:	Closed 2-part displaced fracture of surgical neck of left humerus, initial encounter Principal Discharge DX:	Hyponatremia  Secondary Diagnosis:	Hypokalemia  Secondary Diagnosis:	Bruising

## 2017-11-05 NOTE — CONSULT NOTE ADULT - ATTENDING COMMENTS
Healing left proximal humerus fracture. Left ankle fibular fracture, acceptable position in splint. Pt not interested in sx now. Needs repeat xray in 1 week.
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
## 2017-11-06 LAB
ALBUMIN SERPL ELPH-MCNC: 3.1 G/DL — LOW (ref 3.3–5)
ALP SERPL-CCNC: 99 U/L — SIGNIFICANT CHANGE UP (ref 40–120)
ALT FLD-CCNC: 28 U/L — SIGNIFICANT CHANGE UP (ref 12–78)
ANION GAP SERPL CALC-SCNC: 12 MMOL/L — SIGNIFICANT CHANGE UP (ref 5–17)
AST SERPL-CCNC: 35 U/L — SIGNIFICANT CHANGE UP (ref 15–37)
BILIRUB SERPL-MCNC: 0.9 MG/DL — SIGNIFICANT CHANGE UP (ref 0.2–1.2)
BUN SERPL-MCNC: 10 MG/DL — SIGNIFICANT CHANGE UP (ref 7–23)
CALCIUM SERPL-MCNC: 8.1 MG/DL — LOW (ref 8.5–10.1)
CHLORIDE SERPL-SCNC: 79 MMOL/L — LOW (ref 96–108)
CO2 SERPL-SCNC: 26 MMOL/L — SIGNIFICANT CHANGE UP (ref 22–31)
CREAT SERPL-MCNC: 0.51 MG/DL — SIGNIFICANT CHANGE UP (ref 0.5–1.3)
CULTURE RESULTS: NO GROWTH — SIGNIFICANT CHANGE UP
GLUCOSE SERPL-MCNC: 93 MG/DL — SIGNIFICANT CHANGE UP (ref 70–99)
HCT VFR BLD CALC: 26.7 % — LOW (ref 34.5–45)
HGB BLD-MCNC: 9.3 G/DL — LOW (ref 11.5–15.5)
MCHC RBC-ENTMCNC: 29.4 PG — SIGNIFICANT CHANGE UP (ref 27–34)
MCHC RBC-ENTMCNC: 34.9 GM/DL — SIGNIFICANT CHANGE UP (ref 32–36)
MCV RBC AUTO: 84.2 FL — SIGNIFICANT CHANGE UP (ref 80–100)
PLATELET # BLD AUTO: 252 K/UL — SIGNIFICANT CHANGE UP (ref 150–400)
POTASSIUM SERPL-MCNC: 3.2 MMOL/L — LOW (ref 3.5–5.3)
POTASSIUM SERPL-SCNC: 3.2 MMOL/L — LOW (ref 3.5–5.3)
PROT SERPL-MCNC: 6.3 GM/DL — SIGNIFICANT CHANGE UP (ref 6–8.3)
RBC # BLD: 3.17 M/UL — LOW (ref 3.8–5.2)
RBC # FLD: 13.4 % — SIGNIFICANT CHANGE UP (ref 11–15)
SODIUM SERPL-SCNC: 117 MMOL/L — CRITICAL LOW (ref 135–145)
SPECIMEN SOURCE: SIGNIFICANT CHANGE UP
WBC # BLD: 8.6 K/UL — SIGNIFICANT CHANGE UP (ref 3.8–10.5)
WBC # FLD AUTO: 8.6 K/UL — SIGNIFICANT CHANGE UP (ref 3.8–10.5)

## 2017-11-06 RX ORDER — POTASSIUM CHLORIDE 20 MEQ
40 PACKET (EA) ORAL ONCE
Qty: 0 | Refills: 0 | Status: COMPLETED | OUTPATIENT
Start: 2017-11-06 | End: 2017-11-06

## 2017-11-06 RX ORDER — HEPARIN SODIUM 5000 [USP'U]/ML
5000 INJECTION INTRAVENOUS; SUBCUTANEOUS EVERY 12 HOURS
Qty: 0 | Refills: 0 | Status: DISCONTINUED | OUTPATIENT
Start: 2017-11-06 | End: 2017-11-14

## 2017-11-06 RX ORDER — OXYCODONE HYDROCHLORIDE 5 MG/1
5 TABLET ORAL EVERY 4 HOURS
Qty: 0 | Refills: 0 | Status: DISCONTINUED | OUTPATIENT
Start: 2017-11-06 | End: 2017-11-13

## 2017-11-06 RX ORDER — OXYCODONE HYDROCHLORIDE 5 MG/1
5 TABLET ORAL EVERY 4 HOURS
Qty: 0 | Refills: 0 | Status: DISCONTINUED | OUTPATIENT
Start: 2017-11-06 | End: 2017-11-06

## 2017-11-06 RX ORDER — POTASSIUM CHLORIDE 20 MEQ
40 PACKET (EA) ORAL ONCE
Qty: 0 | Refills: 0 | Status: DISCONTINUED | OUTPATIENT
Start: 2017-11-06 | End: 2017-11-06

## 2017-11-06 RX ADMIN — OXYCODONE HYDROCHLORIDE 5 MILLIGRAM(S): 5 TABLET ORAL at 22:00

## 2017-11-06 RX ADMIN — HEPARIN SODIUM 5000 UNIT(S): 5000 INJECTION INTRAVENOUS; SUBCUTANEOUS at 05:51

## 2017-11-06 RX ADMIN — ATORVASTATIN CALCIUM 20 MILLIGRAM(S): 80 TABLET, FILM COATED ORAL at 21:32

## 2017-11-06 RX ADMIN — Medication 40 MILLIEQUIVALENT(S): at 12:54

## 2017-11-06 RX ADMIN — MORPHINE SULFATE 2 MILLIGRAM(S): 50 CAPSULE, EXTENDED RELEASE ORAL at 04:25

## 2017-11-06 RX ADMIN — Medication 40 MILLIGRAM(S): at 12:54

## 2017-11-06 RX ADMIN — PANTOPRAZOLE SODIUM 40 MILLIGRAM(S): 20 TABLET, DELAYED RELEASE ORAL at 05:51

## 2017-11-06 RX ADMIN — Medication 50 MILLIGRAM(S): at 17:49

## 2017-11-06 RX ADMIN — OXYCODONE HYDROCHLORIDE 5 MILLIGRAM(S): 5 TABLET ORAL at 11:49

## 2017-11-06 RX ADMIN — OXYCODONE HYDROCHLORIDE 5 MILLIGRAM(S): 5 TABLET ORAL at 12:52

## 2017-11-06 RX ADMIN — Medication 50 MILLIGRAM(S): at 05:51

## 2017-11-06 RX ADMIN — DEXTROSE MONOHYDRATE, SODIUM CHLORIDE, AND POTASSIUM CHLORIDE 50 MILLILITER(S): 50; .745; 4.5 INJECTION, SOLUTION INTRAVENOUS at 04:31

## 2017-11-06 RX ADMIN — MORPHINE SULFATE 2 MILLIGRAM(S): 50 CAPSULE, EXTENDED RELEASE ORAL at 03:25

## 2017-11-06 RX ADMIN — TIOTROPIUM BROMIDE 1 CAPSULE(S): 18 CAPSULE ORAL; RESPIRATORY (INHALATION) at 15:45

## 2017-11-06 RX ADMIN — LISINOPRIL 20 MILLIGRAM(S): 2.5 TABLET ORAL at 05:51

## 2017-11-06 RX ADMIN — Medication 81 MILLIGRAM(S): at 11:49

## 2017-11-06 RX ADMIN — HEPARIN SODIUM 5000 UNIT(S): 5000 INJECTION INTRAVENOUS; SUBCUTANEOUS at 17:49

## 2017-11-06 RX ADMIN — OXYCODONE HYDROCHLORIDE 5 MILLIGRAM(S): 5 TABLET ORAL at 21:32

## 2017-11-06 NOTE — PHYSICAL THERAPY INITIAL EVALUATION ADULT - GENERAL OBSERVATIONS, REHAB EVAL
Patient encountered supine in bed, vital signs as charted. Attachments: cardiac monitor, left short leg cast. AAOx4 (lethargic). Not in apparent pain or shortness of breath. Noted multiple ecchymoses to left lower limb and both orbital areas.

## 2017-11-06 NOTE — PHYSICAL THERAPY INITIAL EVALUATION ADULT - CRITERIA FOR SKILLED THERAPEUTIC INTERVENTIONS
anticipated equipment needs at discharge/predicted duration of therapy intervention/anticipated discharge recommendation/risk reduction/prevention/rehab potential/impairments found/functional limitations in following categories

## 2017-11-06 NOTE — PHYSICAL THERAPY INITIAL EVALUATION ADULT - MODIFIED CLINICAL TEST OF SENSORY INTEGRATION IN BALANCE TEST
Barthel Index: Feeding Score _0__, Bathing Score _0__, Grooming Score _0__, Dressing Score _0__, Bowels Score _0__, Bladder Score _5__, Toilet Score _5__, Transfers Score _5__, Mobility Score _5__, Stairs Score _0__,     Total Score __20_

## 2017-11-06 NOTE — OCCUPATIONAL THERAPY INITIAL EVALUATION ADULT - IMPAIRMENTS CONTRIBUTING IMPAIRED BED MOBILITY, REHAB EVAL
decreased flexibility/impaired motor control/abnormal muscle tone/narrow base of support/cognition/impaired coordination/pain/impaired postural control/impaired sensory feedback/decreased strength/decreased sensation/decreased ROM

## 2017-11-06 NOTE — OCCUPATIONAL THERAPY INITIAL EVALUATION ADULT - GENERAL OBSERVATIONS, REHAB EVAL
Pt was seen for initial OT consult, encountered supine in bed on cardiac monitor and on oxygen via nasal canula; left ankle is in soft  cast with toes laxed, ecchymotic areas are noted on left, buttock  and sacrum. Pt was  AA&Ox2 confused, tangential at times ,but cooperative & followed commands. Pt presents with generalized weakness, decreased muscle strength , ROM, endurance , balance ADL and functional mobility;
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
## 2017-11-06 NOTE — PHYSICAL THERAPY INITIAL EVALUATION ADULT - PLANNED THERAPY INTERVENTIONS, PT EVAL
gait training/postural re-education/strengthening/neuromuscular re-education/ROM/transfer training/stretching/joint mobilization/balance training/bed mobility training

## 2017-11-06 NOTE — OCCUPATIONAL THERAPY INITIAL EVALUATION ADULT - SOCIAL CONCERNS
Pt voiced concerns  that she needs to wear diapers , because she wears them at  home;  pt emphasized that she cannot control her urine ./Complex psychosocial needs/coping issues

## 2017-11-06 NOTE — PHYSICAL THERAPY INITIAL EVALUATION ADULT - PERTINENT HX OF CURRENT PROBLEM, REHAB EVAL
Patient brought in from home following fall. Chart reviewed and noted critically low Na and K; CT Head/Cervical/Hip/Facial without acute fractures or neuro changes; XRay left Ankle showing widened space (indicating potential ligamental injury) and (+) distal fibular fracture. XRay left hip and knee ruled out fractures or periprosthetic changes on (L) TKR; (L) Shoulder XRay showing callous forming old humeral neck fracture.

## 2017-11-06 NOTE — OCCUPATIONAL THERAPY INITIAL EVALUATION ADULT - PLANNED THERAPY INTERVENTIONS, OT EVAL
balance training/joint mobilization/motor coordination training/neuromuscular re-education/cognitive, visual perceptual/ROM/ADL retraining/bed mobility training/fine motor coordination training/massage/strengthening/stretching/transfer training/energy conservation techniques/IADL retraining/parent/caregiver training...

## 2017-11-06 NOTE — PATIENT PROFILE ADULT. - DIETITIAN.
Encounter addended by: Sarah Mak on: 7/6/2017  8:23 AM<BR>     Documentation filed: Charges VN dietitian/nutrition services

## 2017-11-06 NOTE — PHYSICAL THERAPY INITIAL EVALUATION ADULT - IMPAIRMENTS FOUND, PT EVAL
neuromotor development and sensory integration/muscle strength/poor safety awareness/cognitive impairment/gait, locomotion, and balance/arousal, attention, and cognition/integumentary integrity/joint integrity and mobility/ROM/aerobic capacity/endurance

## 2017-11-06 NOTE — OCCUPATIONAL THERAPY INITIAL EVALUATION ADULT - BALANCE DISTURBANCE, IDENTIFIED IMPAIRMENT CONTRIBUTE, REHAB EVAL
impaired coordination/decreased ROM/abnormal muscle tone/impaired motor control/pain/decreased strength

## 2017-11-06 NOTE — OCCUPATIONAL THERAPY INITIAL EVALUATION ADULT - PERSONAL SAFETY AND JUDGMENT, REHAB EVAL
Pt  needs verbal cues for proper hand  and foot placements during  ADL and functional  mobility  tasks/impaired/at risk behaviors demonstrated

## 2017-11-06 NOTE — OCCUPATIONAL THERAPY INITIAL EVALUATION ADULT - PRECAUTIONS/LIMITATIONS, REHAB EVAL
diminished reaction time due to age related changes; movement velocity and  reflexes are diminished in left UE due to weakness/fall precautions/obesity precautions/cardiac precautions
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
## 2017-11-06 NOTE — OCCUPATIONAL THERAPY INITIAL EVALUATION ADULT - PERTINENT HX OF CURRENT PROBLEM, REHAB EVAL
Pt presented to ER on due to a s/p  falls at  home on the steps . Pt is diagnosed with Hyponatremia , Hypokalemia ans bruising  . MRI on 11/5/17 results confirm no acute intracranial  hemorrhage;  X-ray  11/5/17 of left ankle showed  distal  fibula fracture

## 2017-11-06 NOTE — PHYSICAL THERAPY INITIAL EVALUATION ADULT - TRANSFER SAFETY CONCERNS NOTED: BED/CHAIR, REHAB EVAL
losing balance/decreased safety awareness/decreased sequencing ability/inability to maintain weight-bearing restrictions w/o assist/decreased balance during turns

## 2017-11-06 NOTE — OCCUPATIONAL THERAPY INITIAL EVALUATION ADULT - ADDITIONAL COMMENTS
Prior to admission, Pt was functioning in her roles, & ambulating independently for short devices with  a rolling walker . Pt claimed she was never home alone;  her daughter was always with her. Presently, pt  needs more assistance than before with  functional  mobility and to complete self care tasks . The scale below depicts a picture of the pt's current level of functioning. Barthel Index: Feeding Score____0__, Bathing Score_____0_, Grooming Score__0___, Dressing Score____0_, Bowel Score___0__, Bladder Score______, Toilet Score____0_, Transfer Score__0____, Mobility Score___0__, Stairs Score___0__, Total Score___0__.

## 2017-11-06 NOTE — PHYSICAL THERAPY INITIAL EVALUATION ADULT - ADDITIONAL COMMENTS
As per patient, lives c daughter in private house c 5 stair steps to enter. Had previous falls. Denies home health aide services. Used a cane or rolling walker at home. States linn snot go out much.

## 2017-11-06 NOTE — OCCUPATIONAL THERAPY INITIAL EVALUATION ADULT - RANGE OF MOTION EXAMINATION, UPPER EXTREMITY
Left UE Passive ROM was WFL  (within functional limits)/Right UE Active ROM was WNL (within normal limits)/Right UE Passive ROM was WNL (within normal limits)

## 2017-11-07 DIAGNOSIS — E87.6 HYPOKALEMIA: ICD-10-CM

## 2017-11-07 DIAGNOSIS — E87.1 HYPO-OSMOLALITY AND HYPONATREMIA: ICD-10-CM

## 2017-11-07 DIAGNOSIS — T82.218A OTHER MECHANICAL COMPLICATION OF CORONARY ARTERY BYPASS GRAFT, INITIAL ENCOUNTER: ICD-10-CM

## 2017-11-07 DIAGNOSIS — T14.8XXA OTHER INJURY OF UNSPECIFIED BODY REGION, INITIAL ENCOUNTER: ICD-10-CM

## 2017-11-07 LAB
ALBUMIN SERPL ELPH-MCNC: 3 G/DL — LOW (ref 3.3–5)
ALP SERPL-CCNC: 102 U/L — SIGNIFICANT CHANGE UP (ref 40–120)
ALT FLD-CCNC: 31 U/L — SIGNIFICANT CHANGE UP (ref 12–78)
ANION GAP SERPL CALC-SCNC: 10 MMOL/L — SIGNIFICANT CHANGE UP (ref 5–17)
AST SERPL-CCNC: 29 U/L — SIGNIFICANT CHANGE UP (ref 15–37)
BILIRUB SERPL-MCNC: 0.7 MG/DL — SIGNIFICANT CHANGE UP (ref 0.2–1.2)
BUN SERPL-MCNC: 15 MG/DL — SIGNIFICANT CHANGE UP (ref 7–23)
CALCIUM SERPL-MCNC: 8.2 MG/DL — LOW (ref 8.5–10.1)
CHLORIDE SERPL-SCNC: 84 MMOL/L — LOW (ref 96–108)
CO2 SERPL-SCNC: 25 MMOL/L — SIGNIFICANT CHANGE UP (ref 22–31)
CREAT SERPL-MCNC: 0.69 MG/DL — SIGNIFICANT CHANGE UP (ref 0.5–1.3)
GLUCOSE SERPL-MCNC: 96 MG/DL — SIGNIFICANT CHANGE UP (ref 70–99)
HCT VFR BLD CALC: 29.9 % — LOW (ref 34.5–45)
HGB BLD-MCNC: 10 G/DL — LOW (ref 11.5–15.5)
MCHC RBC-ENTMCNC: 28.7 PG — SIGNIFICANT CHANGE UP (ref 27–34)
MCHC RBC-ENTMCNC: 33.6 GM/DL — SIGNIFICANT CHANGE UP (ref 32–36)
MCV RBC AUTO: 85.5 FL — SIGNIFICANT CHANGE UP (ref 80–100)
NT-PROBNP SERPL-SCNC: HIGH PG/ML (ref 0–450)
PLATELET # BLD AUTO: 283 K/UL — SIGNIFICANT CHANGE UP (ref 150–400)
POTASSIUM SERPL-MCNC: 4.6 MMOL/L — SIGNIFICANT CHANGE UP (ref 3.5–5.3)
POTASSIUM SERPL-SCNC: 4.6 MMOL/L — SIGNIFICANT CHANGE UP (ref 3.5–5.3)
PROT SERPL-MCNC: 6.3 GM/DL — SIGNIFICANT CHANGE UP (ref 6–8.3)
RBC # BLD: 3.5 M/UL — LOW (ref 3.8–5.2)
RBC # FLD: 14.2 % — SIGNIFICANT CHANGE UP (ref 11–15)
SODIUM SERPL-SCNC: 119 MMOL/L — CRITICAL LOW (ref 135–145)
WBC # BLD: 10.8 K/UL — HIGH (ref 3.8–10.5)
WBC # FLD AUTO: 10.8 K/UL — HIGH (ref 3.8–10.5)

## 2017-11-07 RX ORDER — SODIUM CHLORIDE 9 MG/ML
1000 INJECTION INTRAMUSCULAR; INTRAVENOUS; SUBCUTANEOUS
Qty: 0 | Refills: 0 | Status: DISCONTINUED | OUTPATIENT
Start: 2017-11-07 | End: 2017-11-09

## 2017-11-07 RX ORDER — ACETAMINOPHEN 500 MG
650 TABLET ORAL EVERY 6 HOURS
Qty: 0 | Refills: 0 | Status: DISCONTINUED | OUTPATIENT
Start: 2017-11-07 | End: 2017-11-14

## 2017-11-07 RX ORDER — FUROSEMIDE 40 MG
40 TABLET ORAL ONCE
Qty: 0 | Refills: 0 | Status: COMPLETED | OUTPATIENT
Start: 2017-11-07 | End: 2017-11-07

## 2017-11-07 RX ADMIN — ZOLPIDEM TARTRATE 5 MILLIGRAM(S): 10 TABLET ORAL at 00:39

## 2017-11-07 RX ADMIN — Medication 650 MILLIGRAM(S): at 17:58

## 2017-11-07 RX ADMIN — HEPARIN SODIUM 5000 UNIT(S): 5000 INJECTION INTRAVENOUS; SUBCUTANEOUS at 05:55

## 2017-11-07 RX ADMIN — TIOTROPIUM BROMIDE 1 CAPSULE(S): 18 CAPSULE ORAL; RESPIRATORY (INHALATION) at 11:07

## 2017-11-07 RX ADMIN — PANTOPRAZOLE SODIUM 40 MILLIGRAM(S): 20 TABLET, DELAYED RELEASE ORAL at 05:56

## 2017-11-07 RX ADMIN — TRAMADOL HYDROCHLORIDE 50 MILLIGRAM(S): 50 TABLET ORAL at 00:26

## 2017-11-07 RX ADMIN — Medication 40 MILLIGRAM(S): at 11:07

## 2017-11-07 RX ADMIN — OXYCODONE HYDROCHLORIDE 5 MILLIGRAM(S): 5 TABLET ORAL at 05:55

## 2017-11-07 RX ADMIN — ATORVASTATIN CALCIUM 20 MILLIGRAM(S): 80 TABLET, FILM COATED ORAL at 22:46

## 2017-11-07 RX ADMIN — OXYCODONE HYDROCHLORIDE 5 MILLIGRAM(S): 5 TABLET ORAL at 07:30

## 2017-11-07 RX ADMIN — TRAMADOL HYDROCHLORIDE 50 MILLIGRAM(S): 50 TABLET ORAL at 23:47

## 2017-11-07 RX ADMIN — Medication 81 MILLIGRAM(S): at 11:07

## 2017-11-07 RX ADMIN — Medication 50 MILLIGRAM(S): at 05:55

## 2017-11-07 RX ADMIN — Medication 650 MILLIGRAM(S): at 19:27

## 2017-11-07 RX ADMIN — TRAMADOL HYDROCHLORIDE 50 MILLIGRAM(S): 50 TABLET ORAL at 01:00

## 2017-11-07 RX ADMIN — HEPARIN SODIUM 5000 UNIT(S): 5000 INJECTION INTRAVENOUS; SUBCUTANEOUS at 17:59

## 2017-11-07 RX ADMIN — LISINOPRIL 20 MILLIGRAM(S): 2.5 TABLET ORAL at 05:55

## 2017-11-07 RX ADMIN — Medication 40 MILLIGRAM(S): at 10:25

## 2017-11-07 RX ADMIN — Medication 50 MILLIGRAM(S): at 17:59

## 2017-11-07 RX ADMIN — OXYCODONE HYDROCHLORIDE 5 MILLIGRAM(S): 5 TABLET ORAL at 20:40

## 2017-11-07 RX ADMIN — OXYCODONE HYDROCHLORIDE 5 MILLIGRAM(S): 5 TABLET ORAL at 20:07

## 2017-11-07 RX ADMIN — TRAMADOL HYDROCHLORIDE 50 MILLIGRAM(S): 50 TABLET ORAL at 22:47

## 2017-11-07 RX ADMIN — SODIUM CHLORIDE 75 MILLILITER(S): 9 INJECTION INTRAMUSCULAR; INTRAVENOUS; SUBCUTANEOUS at 10:27

## 2017-11-08 ENCOUNTER — TRANSCRIPTION ENCOUNTER (OUTPATIENT)
Age: 82
End: 2017-11-08

## 2017-11-08 DIAGNOSIS — S82.842A DISPLACED BIMALLEOLAR FRACTURE OF LEFT LOWER LEG, INITIAL ENCOUNTER FOR CLOSED FRACTURE: ICD-10-CM

## 2017-11-08 LAB
ANION GAP SERPL CALC-SCNC: 14 MMOL/L — SIGNIFICANT CHANGE UP (ref 5–17)
BUN SERPL-MCNC: 14 MG/DL — SIGNIFICANT CHANGE UP (ref 7–23)
CALCIUM SERPL-MCNC: 8.1 MG/DL — LOW (ref 8.5–10.1)
CHLORIDE SERPL-SCNC: 86 MMOL/L — LOW (ref 96–108)
CO2 SERPL-SCNC: 21 MMOL/L — LOW (ref 22–31)
CREAT SERPL-MCNC: 0.51 MG/DL — SIGNIFICANT CHANGE UP (ref 0.5–1.3)
GLUCOSE SERPL-MCNC: 97 MG/DL — SIGNIFICANT CHANGE UP (ref 70–99)
POTASSIUM SERPL-MCNC: 3.8 MMOL/L — SIGNIFICANT CHANGE UP (ref 3.5–5.3)
POTASSIUM SERPL-SCNC: 3.8 MMOL/L — SIGNIFICANT CHANGE UP (ref 3.5–5.3)
SODIUM SERPL-SCNC: 121 MMOL/L — LOW (ref 135–145)

## 2017-11-08 PROCEDURE — 90792 PSYCH DIAG EVAL W/MED SRVCS: CPT

## 2017-11-08 RX ORDER — HYDRALAZINE HCL 50 MG
10 TABLET ORAL ONCE
Qty: 0 | Refills: 0 | Status: COMPLETED | OUTPATIENT
Start: 2017-11-08 | End: 2017-11-08

## 2017-11-08 RX ADMIN — OXYCODONE HYDROCHLORIDE 5 MILLIGRAM(S): 5 TABLET ORAL at 18:08

## 2017-11-08 RX ADMIN — ATORVASTATIN CALCIUM 20 MILLIGRAM(S): 80 TABLET, FILM COATED ORAL at 21:29

## 2017-11-08 RX ADMIN — SODIUM CHLORIDE 75 MILLILITER(S): 9 INJECTION INTRAMUSCULAR; INTRAVENOUS; SUBCUTANEOUS at 10:46

## 2017-11-08 RX ADMIN — OXYCODONE HYDROCHLORIDE 5 MILLIGRAM(S): 5 TABLET ORAL at 16:31

## 2017-11-08 RX ADMIN — TIOTROPIUM BROMIDE 1 CAPSULE(S): 18 CAPSULE ORAL; RESPIRATORY (INHALATION) at 11:48

## 2017-11-08 RX ADMIN — Medication 10 MILLIGRAM(S): at 23:07

## 2017-11-08 RX ADMIN — HEPARIN SODIUM 5000 UNIT(S): 5000 INJECTION INTRAVENOUS; SUBCUTANEOUS at 18:09

## 2017-11-08 RX ADMIN — PANTOPRAZOLE SODIUM 40 MILLIGRAM(S): 20 TABLET, DELAYED RELEASE ORAL at 05:40

## 2017-11-08 RX ADMIN — Medication 40 MILLIGRAM(S): at 11:47

## 2017-11-08 RX ADMIN — ZOLPIDEM TARTRATE 5 MILLIGRAM(S): 10 TABLET ORAL at 21:29

## 2017-11-08 RX ADMIN — Medication 50 MILLIGRAM(S): at 18:09

## 2017-11-08 RX ADMIN — LISINOPRIL 20 MILLIGRAM(S): 2.5 TABLET ORAL at 05:39

## 2017-11-08 RX ADMIN — Medication 81 MILLIGRAM(S): at 11:47

## 2017-11-08 RX ADMIN — Medication 50 MILLIGRAM(S): at 05:40

## 2017-11-08 RX ADMIN — OXYCODONE HYDROCHLORIDE 5 MILLIGRAM(S): 5 TABLET ORAL at 22:29

## 2017-11-08 RX ADMIN — HEPARIN SODIUM 5000 UNIT(S): 5000 INJECTION INTRAVENOUS; SUBCUTANEOUS at 05:39

## 2017-11-08 RX ADMIN — OXYCODONE HYDROCHLORIDE 5 MILLIGRAM(S): 5 TABLET ORAL at 21:29

## 2017-11-08 NOTE — DISCHARGE NOTE ADULT - MEDICATION SUMMARY - MEDICATIONS TO TAKE
I will START or STAY ON the medications listed below when I get home from the hospital:    acetaminophen 325 mg oral tablet  -- 2 tab(s) by mouth every 6 hours, As needed, Mild Pain (1 - 3)  -- Indication: For Ankle fracture, bimalleolar, closed, left, initial encounter    aspirin 81 mg oral delayed release tablet  -- 1 tab(s) by mouth once a day  -- Indication: For Bruising    traMADol 50 mg oral tablet  -- 1 tab(s) by mouth every 4 hours, As needed, Mild Pain (1 - 3)  -- Indication: For Ankle fracture, bimalleolar, closed, left, initial encounter    lisinopril 20 mg oral tablet  -- 1 tab(s) by mouth once a day  -- Indication: For Coronary bypass graft mechanical complication    FLUoxetine 40 mg oral capsule  -- 1 cap(s) by mouth once a day  -- Indication: For Cognitive decline    atorvastatin 20 mg oral tablet  -- 1 tab(s) by mouth once a day (at bedtime)  -- Indication: For Coronary bypass graft mechanical complication    metoprolol tartrate 50 mg oral tablet  -- 1 tab(s) by mouth 2 times a day  -- Indication: For Coronary bypass graft mechanical complication    albuterol 90 mcg/inh inhalation aerosol  -- 1 puff(s) inhaled every 4 hours, As needed, Shortness of Breath and/or Wheezing  -- Indication: For Coronary bypass graft mechanical complication    furosemide 40 mg oral tablet  -- 1 tab(s) by mouth once a day  -- Indication: For Coronary bypass graft mechanical complication    Innerclean oral tablet  -- 2 tab(s) by mouth once a day (at bedtime), As needed, Constipation  -- Indication: For Bruising    K-Tab 10 mEq oral tablet, extended release  -- 1 tab(s) by mouth once a day  -- It is very important that you take or use this exactly as directed.  Do not skip doses or discontinue unless directed by your doctor.  Medication should be taken with plenty of water.  Take with food or milk.    -- Indication: For Hypokalemia    pantoprazole 40 mg oral delayed release tablet  -- 1 tab(s) by mouth once a day (before a meal)  -- Indication: For Hyponatremia
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
## 2017-11-08 NOTE — BEHAVIORAL HEALTH ASSESSMENT NOTE - DESCRIPTION
VOMITING/NAUSEA/constipation
HTN, GERD, h/o cardiac arrhythmia, VT, s/p AICD, hx of CVA, hx of BETSY, b/l TKR, hearing impairment, DMII, TRISTA, multiple syncopal episodes, CAD, CHF, hx of multiple falls., hx of left humerus fx, now s/p ankle fracture

## 2017-11-08 NOTE — DIETITIAN INITIAL EVALUATION ADULT. - PERTINENT LABORATORY DATA
11-08 Na121 mmol/L<L> Glu 97 mg/dL K+ 3.8 mmol/L Cr  0.51 mg/dL BUN 14 mg/dL Phos n/a   Alb n/a   PAB n/a

## 2017-11-08 NOTE — DISCHARGE NOTE ADULT - PATIENT PORTAL LINK FT
“You can access the [facility] Patient Portal, offered by [facility], by registering with the following website: [email or web address]”
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
## 2017-11-08 NOTE — DIETITIAN INITIAL EVALUATION ADULT. - NUTRITIONGOAL OUTCOME1
Pt to maintain wt +/- 2#; consume > 75% meal/supplements during hospitalization Pt to maintain wt +/- 2#; consume 75% meal/supplements; promote wound healing

## 2017-11-08 NOTE — DISCHARGE NOTE ADULT - CARE PROVIDER_API CALL
Alejo Stuart (MD), Medicine  [address] [place] [address]  Phone: [phone]  Fax: [phone]
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
## 2017-11-08 NOTE — DIETITIAN INITIAL EVALUATION ADULT. - NS AS NUTRI INTERV MEALS SNACK
Change to soft diet for ease of eating/General/healthful diet/Texture-modified diet Change to soft diet for ease of eating, Low Na diet/Texture-modified diet/General/healthful diet/Mineral - modified diet

## 2017-11-08 NOTE — DIETITIAN INITIAL EVALUATION ADULT. - PERTINENT MEDS FT
Heparin, NS @ 75 ml/hr, Oxycodone, Maalox, Prozac, Zestril, Lopressor, Protonix, Senna, Ultram, Ambien

## 2017-11-08 NOTE — DISCHARGE NOTE ADULT - MEDICATION SUMMARY - MEDICATIONS TO STOP TAKING
I will STOP taking the medications listed below when I get home from the hospital:    oxyCODONE-acetaminophen 5mg-325mg oral tablet  -- 1 tab(s) by mouth 3 times a day MDD:3  -- Caution federal law prohibits the transfer of this drug to any person other  than the person for whom it was prescribed.  May cause drowsiness.  Alcohol may intensify this effect.  Use care when operating dangerous machinery.  This prescription cannot be refilled.  This product contains acetaminophen.  Do not use  with any other product containing acetaminophen to prevent possible liver damage.  Using more of this medication than prescribed may cause serious breathing problems.    meclizine 25 mg oral tablet  -- 1 tab(s) by mouth 3 times a day, As needed, Dizziness    zolpidem 5 mg oral tablet  -- 1 tab(s) by mouth once a day (at bedtime), As needed, Insomnia    tiotropium 18 mcg inhalation capsule  -- 1 cap(s) inhaled once a day, As needed, SOB/wheezing

## 2017-11-08 NOTE — DISCHARGE NOTE ADULT - HOSPITAL COURSE
Patient presented to [facility] for multiple fractures and contusions. She was found to be profoundly Hypo Na tremic and Kalemic. She has been replaced and labs have corrected. She now needs rehab. for arm and ankle.
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
## 2017-11-08 NOTE — DIETITIAN INITIAL EVALUATION ADULT. - ENERGY NEEDS
Height (cm): 162.56 (11-06)  Weight (kg): 65 (11-08)  BMI (kg/m2): 24.5 (11-08)  IBW:   54.5 kg +/10%    % IBW:  119%      UBW:  unknown      %UBW: unknown

## 2017-11-08 NOTE — BEHAVIORAL HEALTH ASSESSMENT NOTE - AXIS III
HTN, GERD, h/o cardiac arrhythmia, VT, s/p AICD, hx of CVA, hx of BETSY, b/l TKR, hearing impairment, DMII, TRISTA, multiple syncopal episodes, CAD, CHF, hx of multiple falls., hx of left humerus fx, now s/p ankle fracture
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
## 2017-11-09 DIAGNOSIS — R41.89 OTHER SYMPTOMS AND SIGNS INVOLVING COGNITIVE FUNCTIONS AND AWARENESS: ICD-10-CM

## 2017-11-09 DIAGNOSIS — F01.50 VASCULAR DEMENTIA WITHOUT BEHAVIORAL DISTURBANCE: ICD-10-CM

## 2017-11-09 LAB
ANION GAP SERPL CALC-SCNC: 11 MMOL/L — SIGNIFICANT CHANGE UP (ref 5–17)
BUN SERPL-MCNC: 11 MG/DL — SIGNIFICANT CHANGE UP (ref 7–23)
CALCIUM SERPL-MCNC: 8.1 MG/DL — LOW (ref 8.5–10.1)
CHLORIDE SERPL-SCNC: 89 MMOL/L — LOW (ref 96–108)
CO2 SERPL-SCNC: 24 MMOL/L — SIGNIFICANT CHANGE UP (ref 22–31)
CREAT SERPL-MCNC: 0.42 MG/DL — LOW (ref 0.5–1.3)
GLUCOSE SERPL-MCNC: 104 MG/DL — HIGH (ref 70–99)
HCT VFR BLD CALC: 29.2 % — LOW (ref 34.5–45)
HGB BLD-MCNC: 9.9 G/DL — LOW (ref 11.5–15.5)
MAGNESIUM SERPL-MCNC: 1.6 MG/DL — SIGNIFICANT CHANGE UP (ref 1.6–2.6)
MCHC RBC-ENTMCNC: 28.5 PG — SIGNIFICANT CHANGE UP (ref 27–34)
MCHC RBC-ENTMCNC: 33.9 GM/DL — SIGNIFICANT CHANGE UP (ref 32–36)
MCV RBC AUTO: 84.2 FL — SIGNIFICANT CHANGE UP (ref 80–100)
PHOSPHATE SERPL-MCNC: 2.6 MG/DL — SIGNIFICANT CHANGE UP (ref 2.5–4.5)
PLATELET # BLD AUTO: 253 K/UL — SIGNIFICANT CHANGE UP (ref 150–400)
POTASSIUM SERPL-MCNC: 4 MMOL/L — SIGNIFICANT CHANGE UP (ref 3.5–5.3)
POTASSIUM SERPL-SCNC: 4 MMOL/L — SIGNIFICANT CHANGE UP (ref 3.5–5.3)
RBC # BLD: 3.47 M/UL — LOW (ref 3.8–5.2)
RBC # FLD: 14.1 % — SIGNIFICANT CHANGE UP (ref 11–15)
SODIUM SERPL-SCNC: 124 MMOL/L — LOW (ref 135–145)
WBC # BLD: 8.8 K/UL — SIGNIFICANT CHANGE UP (ref 3.8–10.5)
WBC # FLD AUTO: 8.8 K/UL — SIGNIFICANT CHANGE UP (ref 3.8–10.5)

## 2017-11-09 RX ORDER — FUROSEMIDE 40 MG
40 TABLET ORAL DAILY
Qty: 0 | Refills: 0 | Status: DISCONTINUED | OUTPATIENT
Start: 2017-11-09 | End: 2017-11-14

## 2017-11-09 RX ADMIN — Medication 81 MILLIGRAM(S): at 11:24

## 2017-11-09 RX ADMIN — OXYCODONE HYDROCHLORIDE 5 MILLIGRAM(S): 5 TABLET ORAL at 23:40

## 2017-11-09 RX ADMIN — LISINOPRIL 20 MILLIGRAM(S): 2.5 TABLET ORAL at 05:19

## 2017-11-09 RX ADMIN — Medication 50 MILLIGRAM(S): at 17:14

## 2017-11-09 RX ADMIN — TIOTROPIUM BROMIDE 1 CAPSULE(S): 18 CAPSULE ORAL; RESPIRATORY (INHALATION) at 11:24

## 2017-11-09 RX ADMIN — PANTOPRAZOLE SODIUM 40 MILLIGRAM(S): 20 TABLET, DELAYED RELEASE ORAL at 05:19

## 2017-11-09 RX ADMIN — HEPARIN SODIUM 5000 UNIT(S): 5000 INJECTION INTRAVENOUS; SUBCUTANEOUS at 05:19

## 2017-11-09 RX ADMIN — Medication 50 MILLIGRAM(S): at 05:20

## 2017-11-09 RX ADMIN — HEPARIN SODIUM 5000 UNIT(S): 5000 INJECTION INTRAVENOUS; SUBCUTANEOUS at 17:13

## 2017-11-09 RX ADMIN — Medication 40 MILLIGRAM(S): at 11:24

## 2017-11-10 LAB
ANION GAP SERPL CALC-SCNC: 14 MMOL/L — SIGNIFICANT CHANGE UP (ref 5–17)
BUN SERPL-MCNC: 11 MG/DL — SIGNIFICANT CHANGE UP (ref 7–23)
CALCIUM SERPL-MCNC: 8.6 MG/DL — SIGNIFICANT CHANGE UP (ref 8.5–10.1)
CHLORIDE SERPL-SCNC: 88 MMOL/L — LOW (ref 96–108)
CO2 SERPL-SCNC: 22 MMOL/L — SIGNIFICANT CHANGE UP (ref 22–31)
CREAT SERPL-MCNC: 0.55 MG/DL — SIGNIFICANT CHANGE UP (ref 0.5–1.3)
GLUCOSE SERPL-MCNC: 104 MG/DL — HIGH (ref 70–99)
HCT VFR BLD CALC: 31 % — LOW (ref 34.5–45)
HGB BLD-MCNC: 10.7 G/DL — LOW (ref 11.5–15.5)
MCHC RBC-ENTMCNC: 29.5 PG — SIGNIFICANT CHANGE UP (ref 27–34)
MCHC RBC-ENTMCNC: 34.4 GM/DL — SIGNIFICANT CHANGE UP (ref 32–36)
MCV RBC AUTO: 85.8 FL — SIGNIFICANT CHANGE UP (ref 80–100)
PLATELET # BLD AUTO: 270 K/UL — SIGNIFICANT CHANGE UP (ref 150–400)
POTASSIUM SERPL-MCNC: 4.3 MMOL/L — SIGNIFICANT CHANGE UP (ref 3.5–5.3)
POTASSIUM SERPL-SCNC: 4.3 MMOL/L — SIGNIFICANT CHANGE UP (ref 3.5–5.3)
RBC # BLD: 3.61 M/UL — LOW (ref 3.8–5.2)
RBC # FLD: 13.5 % — SIGNIFICANT CHANGE UP (ref 11–15)
SODIUM SERPL-SCNC: 124 MMOL/L — LOW (ref 135–145)
WBC # BLD: 8.9 K/UL — SIGNIFICANT CHANGE UP (ref 3.8–10.5)
WBC # FLD AUTO: 8.9 K/UL — SIGNIFICANT CHANGE UP (ref 3.8–10.5)

## 2017-11-10 RX ORDER — SODIUM CHLORIDE 9 MG/ML
1 INJECTION INTRAMUSCULAR; INTRAVENOUS; SUBCUTANEOUS
Qty: 0 | Refills: 0 | Status: COMPLETED | OUTPATIENT
Start: 2017-11-10 | End: 2017-11-12

## 2017-11-10 RX ADMIN — TRAMADOL HYDROCHLORIDE 50 MILLIGRAM(S): 50 TABLET ORAL at 18:00

## 2017-11-10 RX ADMIN — TRAMADOL HYDROCHLORIDE 50 MILLIGRAM(S): 50 TABLET ORAL at 11:02

## 2017-11-10 RX ADMIN — Medication 50 MILLIGRAM(S): at 17:19

## 2017-11-10 RX ADMIN — TRAMADOL HYDROCHLORIDE 50 MILLIGRAM(S): 50 TABLET ORAL at 22:28

## 2017-11-10 RX ADMIN — ZOLPIDEM TARTRATE 5 MILLIGRAM(S): 10 TABLET ORAL at 22:30

## 2017-11-10 RX ADMIN — LISINOPRIL 20 MILLIGRAM(S): 2.5 TABLET ORAL at 05:37

## 2017-11-10 RX ADMIN — HEPARIN SODIUM 5000 UNIT(S): 5000 INJECTION INTRAVENOUS; SUBCUTANEOUS at 05:38

## 2017-11-10 RX ADMIN — TRAMADOL HYDROCHLORIDE 50 MILLIGRAM(S): 50 TABLET ORAL at 17:19

## 2017-11-10 RX ADMIN — TRAMADOL HYDROCHLORIDE 50 MILLIGRAM(S): 50 TABLET ORAL at 21:28

## 2017-11-10 RX ADMIN — ATORVASTATIN CALCIUM 20 MILLIGRAM(S): 80 TABLET, FILM COATED ORAL at 21:25

## 2017-11-10 RX ADMIN — TRAMADOL HYDROCHLORIDE 50 MILLIGRAM(S): 50 TABLET ORAL at 11:53

## 2017-11-10 RX ADMIN — OXYCODONE HYDROCHLORIDE 5 MILLIGRAM(S): 5 TABLET ORAL at 00:30

## 2017-11-10 RX ADMIN — Medication 40 MILLIGRAM(S): at 05:37

## 2017-11-10 RX ADMIN — TIOTROPIUM BROMIDE 1 CAPSULE(S): 18 CAPSULE ORAL; RESPIRATORY (INHALATION) at 11:02

## 2017-11-10 RX ADMIN — SODIUM CHLORIDE 1 GRAM(S): 9 INJECTION INTRAMUSCULAR; INTRAVENOUS; SUBCUTANEOUS at 17:19

## 2017-11-10 RX ADMIN — PANTOPRAZOLE SODIUM 40 MILLIGRAM(S): 20 TABLET, DELAYED RELEASE ORAL at 08:03

## 2017-11-10 RX ADMIN — Medication 50 MILLIGRAM(S): at 05:38

## 2017-11-10 RX ADMIN — HEPARIN SODIUM 5000 UNIT(S): 5000 INJECTION INTRAVENOUS; SUBCUTANEOUS at 17:19

## 2017-11-10 RX ADMIN — Medication 40 MILLIGRAM(S): at 11:02

## 2017-11-10 RX ADMIN — SODIUM CHLORIDE 1 GRAM(S): 9 INJECTION INTRAMUSCULAR; INTRAVENOUS; SUBCUTANEOUS at 09:30

## 2017-11-10 RX ADMIN — Medication 81 MILLIGRAM(S): at 11:02

## 2017-11-11 LAB
ANION GAP SERPL CALC-SCNC: 11 MMOL/L — SIGNIFICANT CHANGE UP (ref 5–17)
BUN SERPL-MCNC: 13 MG/DL — SIGNIFICANT CHANGE UP (ref 7–23)
CALCIUM SERPL-MCNC: 8.1 MG/DL — LOW (ref 8.5–10.1)
CHLORIDE SERPL-SCNC: 89 MMOL/L — LOW (ref 96–108)
CO2 SERPL-SCNC: 28 MMOL/L — SIGNIFICANT CHANGE UP (ref 22–31)
CREAT SERPL-MCNC: 0.58 MG/DL — SIGNIFICANT CHANGE UP (ref 0.5–1.3)
GLUCOSE SERPL-MCNC: 97 MG/DL — SIGNIFICANT CHANGE UP (ref 70–99)
HCT VFR BLD CALC: 31 % — LOW (ref 34.5–45)
HGB BLD-MCNC: 10.3 G/DL — LOW (ref 11.5–15.5)
MCHC RBC-ENTMCNC: 28 PG — SIGNIFICANT CHANGE UP (ref 27–34)
MCHC RBC-ENTMCNC: 33.3 GM/DL — SIGNIFICANT CHANGE UP (ref 32–36)
MCV RBC AUTO: 84.1 FL — SIGNIFICANT CHANGE UP (ref 80–100)
PLATELET # BLD AUTO: 283 K/UL — SIGNIFICANT CHANGE UP (ref 150–400)
POTASSIUM SERPL-MCNC: 3.2 MMOL/L — LOW (ref 3.5–5.3)
POTASSIUM SERPL-SCNC: 3.2 MMOL/L — LOW (ref 3.5–5.3)
RBC # BLD: 3.69 M/UL — LOW (ref 3.8–5.2)
RBC # FLD: 14.2 % — SIGNIFICANT CHANGE UP (ref 11–15)
SODIUM SERPL-SCNC: 128 MMOL/L — LOW (ref 135–145)
WBC # BLD: 8 K/UL — SIGNIFICANT CHANGE UP (ref 3.8–10.5)
WBC # FLD AUTO: 8 K/UL — SIGNIFICANT CHANGE UP (ref 3.8–10.5)

## 2017-11-11 RX ORDER — POTASSIUM CHLORIDE 20 MEQ
40 PACKET (EA) ORAL EVERY 4 HOURS
Qty: 0 | Refills: 0 | Status: COMPLETED | OUTPATIENT
Start: 2017-11-11 | End: 2017-11-11

## 2017-11-11 RX ADMIN — TRAMADOL HYDROCHLORIDE 50 MILLIGRAM(S): 50 TABLET ORAL at 05:50

## 2017-11-11 RX ADMIN — Medication 40 MILLIGRAM(S): at 11:49

## 2017-11-11 RX ADMIN — Medication 50 MILLIGRAM(S): at 17:29

## 2017-11-11 RX ADMIN — SODIUM CHLORIDE 1 GRAM(S): 9 INJECTION INTRAMUSCULAR; INTRAVENOUS; SUBCUTANEOUS at 05:52

## 2017-11-11 RX ADMIN — PANTOPRAZOLE SODIUM 40 MILLIGRAM(S): 20 TABLET, DELAYED RELEASE ORAL at 08:22

## 2017-11-11 RX ADMIN — LISINOPRIL 20 MILLIGRAM(S): 2.5 TABLET ORAL at 05:52

## 2017-11-11 RX ADMIN — Medication 650 MILLIGRAM(S): at 16:44

## 2017-11-11 RX ADMIN — Medication 40 MILLIEQUIVALENT(S): at 14:06

## 2017-11-11 RX ADMIN — Medication 40 MILLIGRAM(S): at 05:52

## 2017-11-11 RX ADMIN — ATORVASTATIN CALCIUM 20 MILLIGRAM(S): 80 TABLET, FILM COATED ORAL at 21:43

## 2017-11-11 RX ADMIN — SODIUM CHLORIDE 1 GRAM(S): 9 INJECTION INTRAMUSCULAR; INTRAVENOUS; SUBCUTANEOUS at 17:29

## 2017-11-11 RX ADMIN — ZOLPIDEM TARTRATE 5 MILLIGRAM(S): 10 TABLET ORAL at 22:39

## 2017-11-11 RX ADMIN — HEPARIN SODIUM 5000 UNIT(S): 5000 INJECTION INTRAVENOUS; SUBCUTANEOUS at 17:29

## 2017-11-11 RX ADMIN — Medication 81 MILLIGRAM(S): at 11:49

## 2017-11-11 RX ADMIN — Medication 40 MILLIEQUIVALENT(S): at 11:49

## 2017-11-11 RX ADMIN — TRAMADOL HYDROCHLORIDE 50 MILLIGRAM(S): 50 TABLET ORAL at 06:50

## 2017-11-11 RX ADMIN — TIOTROPIUM BROMIDE 1 CAPSULE(S): 18 CAPSULE ORAL; RESPIRATORY (INHALATION) at 12:07

## 2017-11-11 RX ADMIN — Medication 650 MILLIGRAM(S): at 17:27

## 2017-11-11 RX ADMIN — Medication 50 MILLIGRAM(S): at 05:52

## 2017-11-11 RX ADMIN — HEPARIN SODIUM 5000 UNIT(S): 5000 INJECTION INTRAVENOUS; SUBCUTANEOUS at 05:52

## 2017-11-12 LAB
ANION GAP SERPL CALC-SCNC: 10 MMOL/L — SIGNIFICANT CHANGE UP (ref 5–17)
BUN SERPL-MCNC: 17 MG/DL — SIGNIFICANT CHANGE UP (ref 7–23)
CALCIUM SERPL-MCNC: 8.4 MG/DL — LOW (ref 8.5–10.1)
CHLORIDE SERPL-SCNC: 92 MMOL/L — LOW (ref 96–108)
CO2 SERPL-SCNC: 28 MMOL/L — SIGNIFICANT CHANGE UP (ref 22–31)
CREAT SERPL-MCNC: 0.61 MG/DL — SIGNIFICANT CHANGE UP (ref 0.5–1.3)
GLUCOSE SERPL-MCNC: 94 MG/DL — SIGNIFICANT CHANGE UP (ref 70–99)
HCT VFR BLD CALC: 31.3 % — LOW (ref 34.5–45)
HGB BLD-MCNC: 10.5 G/DL — LOW (ref 11.5–15.5)
MCHC RBC-ENTMCNC: 29.2 PG — SIGNIFICANT CHANGE UP (ref 27–34)
MCHC RBC-ENTMCNC: 33.6 GM/DL — SIGNIFICANT CHANGE UP (ref 32–36)
MCV RBC AUTO: 87.1 FL — SIGNIFICANT CHANGE UP (ref 80–100)
PLATELET # BLD AUTO: 245 K/UL — SIGNIFICANT CHANGE UP (ref 150–400)
POTASSIUM SERPL-MCNC: 4.3 MMOL/L — SIGNIFICANT CHANGE UP (ref 3.5–5.3)
POTASSIUM SERPL-SCNC: 4.3 MMOL/L — SIGNIFICANT CHANGE UP (ref 3.5–5.3)
RBC # BLD: 3.6 M/UL — LOW (ref 3.8–5.2)
RBC # FLD: 14.2 % — SIGNIFICANT CHANGE UP (ref 11–15)
SODIUM SERPL-SCNC: 130 MMOL/L — LOW (ref 135–145)
WBC # BLD: 7.4 K/UL — SIGNIFICANT CHANGE UP (ref 3.8–10.5)
WBC # FLD AUTO: 7.4 K/UL — SIGNIFICANT CHANGE UP (ref 3.8–10.5)

## 2017-11-12 RX ORDER — SENNA PLUS 8.6 MG/1
2 TABLET ORAL
Qty: 0 | Refills: 0 | DISCHARGE
Start: 2017-11-12

## 2017-11-12 RX ORDER — TRAMADOL HYDROCHLORIDE 50 MG/1
1 TABLET ORAL
Qty: 0 | Refills: 0 | DISCHARGE
Start: 2017-11-12

## 2017-11-12 RX ADMIN — Medication 81 MILLIGRAM(S): at 11:25

## 2017-11-12 RX ADMIN — Medication 50 MILLIGRAM(S): at 17:27

## 2017-11-12 RX ADMIN — HEPARIN SODIUM 5000 UNIT(S): 5000 INJECTION INTRAVENOUS; SUBCUTANEOUS at 06:32

## 2017-11-12 RX ADMIN — SODIUM CHLORIDE 1 GRAM(S): 9 INJECTION INTRAMUSCULAR; INTRAVENOUS; SUBCUTANEOUS at 06:32

## 2017-11-12 RX ADMIN — OXYCODONE HYDROCHLORIDE 5 MILLIGRAM(S): 5 TABLET ORAL at 18:22

## 2017-11-12 RX ADMIN — Medication 40 MILLIGRAM(S): at 06:32

## 2017-11-12 RX ADMIN — ATORVASTATIN CALCIUM 20 MILLIGRAM(S): 80 TABLET, FILM COATED ORAL at 21:20

## 2017-11-12 RX ADMIN — ZOLPIDEM TARTRATE 5 MILLIGRAM(S): 10 TABLET ORAL at 21:51

## 2017-11-12 RX ADMIN — Medication 50 MILLIGRAM(S): at 06:32

## 2017-11-12 RX ADMIN — HEPARIN SODIUM 5000 UNIT(S): 5000 INJECTION INTRAVENOUS; SUBCUTANEOUS at 17:27

## 2017-11-12 RX ADMIN — TIOTROPIUM BROMIDE 1 CAPSULE(S): 18 CAPSULE ORAL; RESPIRATORY (INHALATION) at 11:25

## 2017-11-12 RX ADMIN — LISINOPRIL 20 MILLIGRAM(S): 2.5 TABLET ORAL at 06:32

## 2017-11-12 RX ADMIN — Medication 40 MILLIGRAM(S): at 11:25

## 2017-11-12 RX ADMIN — PANTOPRAZOLE SODIUM 40 MILLIGRAM(S): 20 TABLET, DELAYED RELEASE ORAL at 06:32

## 2017-11-12 RX ADMIN — OXYCODONE HYDROCHLORIDE 5 MILLIGRAM(S): 5 TABLET ORAL at 18:55

## 2017-11-13 LAB
HCT VFR BLD CALC: 31.5 % — LOW (ref 34.5–45)
HGB BLD-MCNC: 10.3 G/DL — LOW (ref 11.5–15.5)
MCHC RBC-ENTMCNC: 28 PG — SIGNIFICANT CHANGE UP (ref 27–34)
MCHC RBC-ENTMCNC: 32.7 GM/DL — SIGNIFICANT CHANGE UP (ref 32–36)
MCV RBC AUTO: 85.5 FL — SIGNIFICANT CHANGE UP (ref 80–100)
PLATELET # BLD AUTO: 277 K/UL — SIGNIFICANT CHANGE UP (ref 150–400)
RBC # BLD: 3.68 M/UL — LOW (ref 3.8–5.2)
RBC # FLD: 14.6 % — SIGNIFICANT CHANGE UP (ref 11–15)
WBC # BLD: 7.6 K/UL — SIGNIFICANT CHANGE UP (ref 3.8–10.5)
WBC # FLD AUTO: 7.6 K/UL — SIGNIFICANT CHANGE UP (ref 3.8–10.5)

## 2017-11-13 PROCEDURE — 73610 X-RAY EXAM OF ANKLE: CPT | Mod: 26,LT

## 2017-11-13 RX ORDER — METOPROLOL TARTRATE 50 MG
2.5 TABLET ORAL ONCE
Qty: 0 | Refills: 0 | Status: COMPLETED | OUTPATIENT
Start: 2017-11-13 | End: 2017-11-13

## 2017-11-13 RX ORDER — ZOLPIDEM TARTRATE 10 MG/1
5 TABLET ORAL AT BEDTIME
Qty: 0 | Refills: 0 | Status: DISCONTINUED | OUTPATIENT
Start: 2017-11-13 | End: 2017-11-14

## 2017-11-13 RX ORDER — MAGNESIUM HYDROXIDE 400 MG/1
30 TABLET, CHEWABLE ORAL ONCE
Qty: 0 | Refills: 0 | Status: COMPLETED | OUTPATIENT
Start: 2017-11-13 | End: 2017-11-13

## 2017-11-13 RX ORDER — ALPRAZOLAM 0.25 MG
0.25 TABLET ORAL ONCE
Qty: 0 | Refills: 0 | Status: DISCONTINUED | OUTPATIENT
Start: 2017-11-13 | End: 2017-11-13

## 2017-11-13 RX ADMIN — ZOLPIDEM TARTRATE 5 MILLIGRAM(S): 10 TABLET ORAL at 23:41

## 2017-11-13 RX ADMIN — Medication 40 MILLIGRAM(S): at 05:18

## 2017-11-13 RX ADMIN — PANTOPRAZOLE SODIUM 40 MILLIGRAM(S): 20 TABLET, DELAYED RELEASE ORAL at 08:00

## 2017-11-13 RX ADMIN — OXYCODONE HYDROCHLORIDE 5 MILLIGRAM(S): 5 TABLET ORAL at 09:32

## 2017-11-13 RX ADMIN — HEPARIN SODIUM 5000 UNIT(S): 5000 INJECTION INTRAVENOUS; SUBCUTANEOUS at 17:17

## 2017-11-13 RX ADMIN — Medication 650 MILLIGRAM(S): at 17:14

## 2017-11-13 RX ADMIN — HEPARIN SODIUM 5000 UNIT(S): 5000 INJECTION INTRAVENOUS; SUBCUTANEOUS at 05:18

## 2017-11-13 RX ADMIN — Medication 50 MILLIGRAM(S): at 17:18

## 2017-11-13 RX ADMIN — Medication 0.25 MILLIGRAM(S): at 19:22

## 2017-11-13 RX ADMIN — Medication 40 MILLIGRAM(S): at 11:34

## 2017-11-13 RX ADMIN — Medication 50 MILLIGRAM(S): at 05:18

## 2017-11-13 RX ADMIN — Medication 650 MILLIGRAM(S): at 17:56

## 2017-11-13 RX ADMIN — ATORVASTATIN CALCIUM 20 MILLIGRAM(S): 80 TABLET, FILM COATED ORAL at 21:48

## 2017-11-13 RX ADMIN — OXYCODONE HYDROCHLORIDE 5 MILLIGRAM(S): 5 TABLET ORAL at 11:09

## 2017-11-13 RX ADMIN — Medication 81 MILLIGRAM(S): at 11:34

## 2017-11-13 RX ADMIN — TIOTROPIUM BROMIDE 1 CAPSULE(S): 18 CAPSULE ORAL; RESPIRATORY (INHALATION) at 11:34

## 2017-11-13 RX ADMIN — LISINOPRIL 20 MILLIGRAM(S): 2.5 TABLET ORAL at 05:18

## 2017-11-13 RX ADMIN — MAGNESIUM HYDROXIDE 30 MILLILITER(S): 400 TABLET, CHEWABLE ORAL at 23:42

## 2017-11-14 VITALS — DIASTOLIC BLOOD PRESSURE: 62 MMHG | HEART RATE: 71 BPM | SYSTOLIC BLOOD PRESSURE: 151 MMHG

## 2017-11-14 LAB
HCT VFR BLD CALC: 34.4 % — LOW (ref 34.5–45)
HGB BLD-MCNC: 11.4 G/DL — LOW (ref 11.5–15.5)
MCHC RBC-ENTMCNC: 28.8 PG — SIGNIFICANT CHANGE UP (ref 27–34)
MCHC RBC-ENTMCNC: 33.2 GM/DL — SIGNIFICANT CHANGE UP (ref 32–36)
MCV RBC AUTO: 86.7 FL — SIGNIFICANT CHANGE UP (ref 80–100)
PLATELET # BLD AUTO: 284 K/UL — SIGNIFICANT CHANGE UP (ref 150–400)
RBC # BLD: 3.97 M/UL — SIGNIFICANT CHANGE UP (ref 3.8–5.2)
RBC # FLD: 14.2 % — SIGNIFICANT CHANGE UP (ref 11–15)
WBC # BLD: 8.9 K/UL — SIGNIFICANT CHANGE UP (ref 3.8–10.5)
WBC # FLD AUTO: 8.9 K/UL — SIGNIFICANT CHANGE UP (ref 3.8–10.5)

## 2017-11-14 RX ORDER — HYDRALAZINE HCL 50 MG
10 TABLET ORAL ONCE
Qty: 0 | Refills: 0 | Status: COMPLETED | OUTPATIENT
Start: 2017-11-14 | End: 2017-11-14

## 2017-11-14 RX ADMIN — SENNA PLUS 2 TABLET(S): 8.6 TABLET ORAL at 05:22

## 2017-11-14 RX ADMIN — Medication 81 MILLIGRAM(S): at 12:07

## 2017-11-14 RX ADMIN — Medication 40 MILLIGRAM(S): at 05:22

## 2017-11-14 RX ADMIN — Medication 40 MILLIGRAM(S): at 12:07

## 2017-11-14 RX ADMIN — Medication 50 MILLIGRAM(S): at 05:22

## 2017-11-14 RX ADMIN — Medication 10 MILLIGRAM(S): at 10:55

## 2017-11-14 RX ADMIN — TIOTROPIUM BROMIDE 1 CAPSULE(S): 18 CAPSULE ORAL; RESPIRATORY (INHALATION) at 12:23

## 2017-11-14 RX ADMIN — Medication 650 MILLIGRAM(S): at 12:09

## 2017-11-14 RX ADMIN — PANTOPRAZOLE SODIUM 40 MILLIGRAM(S): 20 TABLET, DELAYED RELEASE ORAL at 08:11

## 2017-11-14 RX ADMIN — Medication 650 MILLIGRAM(S): at 12:35

## 2017-11-14 RX ADMIN — HEPARIN SODIUM 5000 UNIT(S): 5000 INJECTION INTRAVENOUS; SUBCUTANEOUS at 05:21

## 2017-11-14 RX ADMIN — LISINOPRIL 20 MILLIGRAM(S): 2.5 TABLET ORAL at 05:22

## 2017-11-14 NOTE — PROGRESS NOTE ADULT - PROBLEM SELECTOR PROBLEM 1
Hyponatremia
Hyponatremia
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
Hypokalemia
Hyponatremia
Hyponatremia
Ankle fracture, bimalleolar, closed, left, initial encounter

## 2017-11-14 NOTE — PROGRESS NOTE ADULT - SUBJECTIVE AND OBJECTIVE BOX
82 yo lady awaiting rehab.
84 yo F w/ L ankle fracture s/p fall 4 days ago in short leg splint, old left proximal humerus fracture, healing w/ callus    PMH:  Coronary bypass graft mechanical complication  ST elevation myocardial infarction (STEMI), unspecified artery  BETSY (acute kidney injury)  Insomnia, unspecified type  Gastroesophageal reflux disease without esophagitis  Acute congestive heart failure, unspecified congestive heart failure type  Hypertension    PE LLE:  Skin intact, Severe echymosis of L Leg below the knee, +Swelling, small (2mm fx blisters over lateral ankle), SILT L2-S1, +EHL/FHL/TA/Gastroc, +Knee/hip ROM, ankle ROM limited 2/2 pain, DP+, soft compartments, no calf ttp.  LUE:   Patient has hisory of proximal humerus fracture, appears healing w/ callus on xray, able to perform some passive range of motion with mild crepitus, patient able to move arm at elbow and wrist, patient has flexion contracture of 4th and 5th digits that is unable to extend against resistence  Secondary:  No TTP over bony landmarks, SILT BL, ROM intact BL, distal pulses palpable.    Imaging:  XR demonstrating L ankle lateral malleolus fracture  Left proximal humerus fracture with bridging callus formation
84 yo lady improving.
84 yo lady with falls. Hypo natremia slowly better.
84 yo lady with slowly improving hyponatremia. Potassium has improved.
Patient is a 84 yo lady with multiple medical issues making slow head way.
84 yo lady with Na making slow progress.
Patient is an 82 yo lady with hyponatremia and multiple fractures.

## 2017-11-14 NOTE — PROGRESS NOTE ADULT - PROBLEM SELECTOR PROBLEM 2
Ankle fracture, bimalleolar, closed, left, initial encounter
Hyponatremia
Ankle fracture, bimalleolar, closed, left, initial encounter
Ankle fracture, bimalleolar, closed, left, initial encounter
Hyponatremia

## 2017-11-16 DIAGNOSIS — I11.0 HYPERTENSIVE HEART DISEASE WITH HEART FAILURE: ICD-10-CM
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
## 2017-11-16 DIAGNOSIS — Z95.1 PRESENCE OF AORTOCORONARY BYPASS GRAFT: ICD-10-CM

## 2017-11-16 DIAGNOSIS — S42.202D UNSPECIFIED FRACTURE OF UPPER END OF LEFT HUMERUS, SUBSEQUENT ENCOUNTER FOR FRACTURE WITH ROUTINE HEALING: ICD-10-CM

## 2017-11-16 DIAGNOSIS — G47.00 INSOMNIA, UNSPECIFIED: ICD-10-CM

## 2017-11-16 DIAGNOSIS — E87.6 HYPOKALEMIA: ICD-10-CM

## 2017-11-16 DIAGNOSIS — Z96.653 PRESENCE OF ARTIFICIAL KNEE JOINT, BILATERAL: ICD-10-CM

## 2017-11-16 DIAGNOSIS — I25.10 ATHEROSCLEROTIC HEART DISEASE OF NATIVE CORONARY ARTERY WITHOUT ANGINA PECTORIS: ICD-10-CM

## 2017-11-16 DIAGNOSIS — I25.2 OLD MYOCARDIAL INFARCTION: ICD-10-CM

## 2017-11-16 DIAGNOSIS — S82.842A DISPLACED BIMALLEOLAR FRACTURE OF LEFT LOWER LEG, INITIAL ENCOUNTER FOR CLOSED FRACTURE: ICD-10-CM
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
## 2017-11-16 DIAGNOSIS — T76.91XS: ICD-10-CM

## 2017-11-16 DIAGNOSIS — E87.1 HYPO-OSMOLALITY AND HYPONATREMIA: ICD-10-CM

## 2017-11-16 DIAGNOSIS — R41.81 AGE-RELATED COGNITIVE DECLINE: ICD-10-CM

## 2017-11-16 DIAGNOSIS — Z79.82 LONG TERM (CURRENT) USE OF ASPIRIN: ICD-10-CM

## 2017-11-16 DIAGNOSIS — J44.9 CHRONIC OBSTRUCTIVE PULMONARY DISEASE, UNSPECIFIED: ICD-10-CM

## 2017-11-16 DIAGNOSIS — E73.9 LACTOSE INTOLERANCE, UNSPECIFIED: ICD-10-CM

## 2017-11-16 DIAGNOSIS — K21.9 GASTRO-ESOPHAGEAL REFLUX DISEASE WITHOUT ESOPHAGITIS: ICD-10-CM

## 2017-11-16 DIAGNOSIS — I50.9 HEART FAILURE, UNSPECIFIED: ICD-10-CM

## 2018-02-02 NOTE — ED ADULT NURSE NOTE - NS TRANSFER EYEGLASSES PAIRS
MD Eshtela Whittaker reviewed discharge instructions with the patient. The patient verbalized understanding. Patient discharged home and is ambulatory to vehicle with steady gait. This RN unable to go back and validate vitals, and was unable to get updated set of vitals prior to d/c because staff member removed patient from room prior to.      Charge made aware 1 pair

## 2018-03-16 ENCOUNTER — APPOINTMENT (OUTPATIENT)
Dept: ORTHOPEDIC SURGERY | Facility: CLINIC | Age: 83
End: 2018-03-16

## 2018-03-19 ENCOUNTER — APPOINTMENT (OUTPATIENT)
Dept: ORTHOPEDIC SURGERY | Facility: CLINIC | Age: 83
End: 2018-03-19
Payer: MEDICARE

## 2018-03-19 PROCEDURE — 99203 OFFICE O/P NEW LOW 30 MIN: CPT

## 2018-03-19 PROCEDURE — 73060 X-RAY EXAM OF HUMERUS: CPT | Mod: LT

## 2018-04-09 ENCOUNTER — APPOINTMENT (OUTPATIENT)
Dept: ORTHOPEDIC SURGERY | Facility: CLINIC | Age: 83
End: 2018-04-09
Payer: MEDICARE

## 2018-04-09 DIAGNOSIS — S42.292K OTHER DISPLACED FRACTURE OF UPPER END OF LEFT HUMERUS, SUBSEQUENT ENCOUNTER FOR FRACTURE WITH NONUNION: ICD-10-CM

## 2018-04-09 DIAGNOSIS — S42.362D DISPLACED SEGMENTAL FRACTURE OF SHAFT OF HUMERUS, LEFT ARM, SUBSEQUENT ENCOUNTER FOR FRACTURE WITH ROUTINE HEALING: ICD-10-CM

## 2018-04-09 PROCEDURE — 99214 OFFICE O/P EST MOD 30 MIN: CPT

## 2018-04-09 PROCEDURE — 73060 X-RAY EXAM OF HUMERUS: CPT | Mod: LT

## 2018-04-09 PROCEDURE — 73030 X-RAY EXAM OF SHOULDER: CPT | Mod: LT

## 2018-05-24 ENCOUNTER — APPOINTMENT (OUTPATIENT)
Dept: ORTHOPEDIC SURGERY | Facility: CLINIC | Age: 83
End: 2018-05-24

## 2018-06-10 NOTE — PROVIDER CONTACT NOTE (CRITICAL VALUE NOTIFICATION) - RECOMMENDATIONS
2 RN skin check completed at admission on 06/09/18 at 2012. Patient skin intact. Small square pink scar on Right lower abdomen.   notify primary

## 2018-12-10 ENCOUNTER — INPATIENT (INPATIENT)
Facility: HOSPITAL | Age: 83
LOS: 2 days | Discharge: NURSING, REHAB OR LONG-TERM CARE | End: 2018-12-13
Attending: INTERNAL MEDICINE | Admitting: INTERNAL MEDICINE
Payer: MEDICARE

## 2018-12-10 VITALS
RESPIRATION RATE: 20 BRPM | SYSTOLIC BLOOD PRESSURE: 197 MMHG | TEMPERATURE: 100 F | DIASTOLIC BLOOD PRESSURE: 83 MMHG | HEART RATE: 98 BPM | WEIGHT: 136.03 LBS | OXYGEN SATURATION: 100 % | HEIGHT: 61 IN
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
## 2018-12-10 DIAGNOSIS — Z95.1 PRESENCE OF AORTOCORONARY BYPASS GRAFT: Chronic | ICD-10-CM

## 2018-12-10 DIAGNOSIS — I50.9 HEART FAILURE, UNSPECIFIED: ICD-10-CM

## 2018-12-10 DIAGNOSIS — K21.9 GASTRO-ESOPHAGEAL REFLUX DISEASE WITHOUT ESOPHAGITIS: ICD-10-CM

## 2018-12-10 DIAGNOSIS — Z96.653 PRESENCE OF ARTIFICIAL KNEE JOINT, BILATERAL: Chronic | ICD-10-CM

## 2018-12-10 DIAGNOSIS — I10 ESSENTIAL (PRIMARY) HYPERTENSION: ICD-10-CM

## 2018-12-10 DIAGNOSIS — R26.81 UNSTEADINESS ON FEET: ICD-10-CM

## 2018-12-10 DIAGNOSIS — Z29.9 ENCOUNTER FOR PROPHYLACTIC MEASURES, UNSPECIFIED: ICD-10-CM

## 2018-12-10 DIAGNOSIS — M79.606 PAIN IN LEG, UNSPECIFIED: ICD-10-CM

## 2018-12-10 LAB
ALBUMIN SERPL ELPH-MCNC: 3.5 G/DL — SIGNIFICANT CHANGE UP (ref 3.3–5)
ALP SERPL-CCNC: 98 U/L — SIGNIFICANT CHANGE UP (ref 40–120)
ALT FLD-CCNC: 21 U/L — SIGNIFICANT CHANGE UP (ref 12–78)
ANION GAP SERPL CALC-SCNC: 11 MMOL/L — SIGNIFICANT CHANGE UP (ref 5–17)
APTT BLD: 32.1 SEC — SIGNIFICANT CHANGE UP (ref 28.5–37)
AST SERPL-CCNC: 35 U/L — SIGNIFICANT CHANGE UP (ref 15–37)
BILIRUB SERPL-MCNC: 0.5 MG/DL — SIGNIFICANT CHANGE UP (ref 0.2–1.2)
BUN SERPL-MCNC: 11 MG/DL — SIGNIFICANT CHANGE UP (ref 7–23)
CALCIUM SERPL-MCNC: 8.6 MG/DL — SIGNIFICANT CHANGE UP (ref 8.5–10.1)
CHLORIDE SERPL-SCNC: 91 MMOL/L — LOW (ref 96–108)
CO2 SERPL-SCNC: 28 MMOL/L — SIGNIFICANT CHANGE UP (ref 22–31)
CREAT SERPL-MCNC: 0.65 MG/DL — SIGNIFICANT CHANGE UP (ref 0.5–1.3)
GLUCOSE SERPL-MCNC: 117 MG/DL — HIGH (ref 70–99)
HCT VFR BLD CALC: 26.3 % — LOW (ref 34.5–45)
HGB BLD-MCNC: 8.2 G/DL — LOW (ref 11.5–15.5)
INR BLD: 1.07 RATIO — SIGNIFICANT CHANGE UP (ref 0.88–1.16)
MAGNESIUM SERPL-MCNC: 1.8 MG/DL — SIGNIFICANT CHANGE UP (ref 1.6–2.6)
MCHC RBC-ENTMCNC: 24.9 PG — LOW (ref 27–34)
MCHC RBC-ENTMCNC: 31.2 GM/DL — LOW (ref 32–36)
MCV RBC AUTO: 79.9 FL — LOW (ref 80–100)
NRBC # BLD: 0 /100 WBCS — SIGNIFICANT CHANGE UP (ref 0–0)
PLATELET # BLD AUTO: 357 K/UL — SIGNIFICANT CHANGE UP (ref 150–400)
POTASSIUM SERPL-MCNC: 3.7 MMOL/L — SIGNIFICANT CHANGE UP (ref 3.5–5.3)
POTASSIUM SERPL-SCNC: 3.7 MMOL/L — SIGNIFICANT CHANGE UP (ref 3.5–5.3)
PROT SERPL-MCNC: 7.6 GM/DL — SIGNIFICANT CHANGE UP (ref 6–8.3)
PROTHROM AB SERPL-ACNC: 12 SEC — SIGNIFICANT CHANGE UP (ref 10–12.9)
RBC # BLD: 3.29 M/UL — LOW (ref 3.8–5.2)
RBC # FLD: 17.4 % — HIGH (ref 10.3–14.5)
SODIUM SERPL-SCNC: 130 MMOL/L — LOW (ref 135–145)
WBC # BLD: 9.54 K/UL — SIGNIFICANT CHANGE UP (ref 3.8–10.5)
WBC # FLD AUTO: 9.54 K/UL — SIGNIFICANT CHANGE UP (ref 3.8–10.5)

## 2018-12-10 PROCEDURE — 93970 EXTREMITY STUDY: CPT | Mod: 26

## 2018-12-10 PROCEDURE — 99285 EMERGENCY DEPT VISIT HI MDM: CPT

## 2018-12-10 RX ORDER — ZOLPIDEM TARTRATE 10 MG/1
5 TABLET ORAL AT BEDTIME
Qty: 0 | Refills: 0 | Status: DISCONTINUED | OUTPATIENT
Start: 2018-12-10 | End: 2018-12-13

## 2018-12-10 RX ORDER — KETOROLAC TROMETHAMINE 30 MG/ML
15 SYRINGE (ML) INJECTION ONCE
Qty: 0 | Refills: 0 | Status: DISCONTINUED | OUTPATIENT
Start: 2018-12-10 | End: 2018-12-10

## 2018-12-10 RX ORDER — FUROSEMIDE 40 MG
40 TABLET ORAL DAILY
Qty: 0 | Refills: 0 | Status: DISCONTINUED | OUTPATIENT
Start: 2018-12-10 | End: 2018-12-13

## 2018-12-10 RX ORDER — FLUOXETINE HCL 10 MG
40 CAPSULE ORAL DAILY
Qty: 0 | Refills: 0 | Status: DISCONTINUED | OUTPATIENT
Start: 2018-12-10 | End: 2018-12-13

## 2018-12-10 RX ORDER — ACETAMINOPHEN 500 MG
650 TABLET ORAL EVERY 6 HOURS
Qty: 0 | Refills: 0 | Status: DISCONTINUED | OUTPATIENT
Start: 2018-12-10 | End: 2018-12-13

## 2018-12-10 RX ORDER — PANTOPRAZOLE SODIUM 20 MG/1
40 TABLET, DELAYED RELEASE ORAL
Qty: 0 | Refills: 0 | Status: DISCONTINUED | OUTPATIENT
Start: 2018-12-10 | End: 2018-12-13

## 2018-12-10 RX ORDER — PANTOPRAZOLE SODIUM 20 MG/1
40 TABLET, DELAYED RELEASE ORAL ONCE
Qty: 0 | Refills: 0 | Status: COMPLETED | OUTPATIENT
Start: 2018-12-10 | End: 2018-12-10

## 2018-12-10 RX ORDER — LISINOPRIL 2.5 MG/1
20 TABLET ORAL DAILY
Qty: 0 | Refills: 0 | Status: DISCONTINUED | OUTPATIENT
Start: 2018-12-10 | End: 2018-12-13

## 2018-12-10 RX ORDER — METOPROLOL TARTRATE 50 MG
50 TABLET ORAL
Qty: 0 | Refills: 0 | Status: DISCONTINUED | OUTPATIENT
Start: 2018-12-10 | End: 2018-12-13

## 2018-12-10 RX ORDER — ASPIRIN/CALCIUM CARB/MAGNESIUM 324 MG
81 TABLET ORAL DAILY
Qty: 0 | Refills: 0 | Status: DISCONTINUED | OUTPATIENT
Start: 2018-12-10 | End: 2018-12-13

## 2018-12-10 RX ORDER — ATORVASTATIN CALCIUM 80 MG/1
20 TABLET, FILM COATED ORAL AT BEDTIME
Qty: 0 | Refills: 0 | Status: DISCONTINUED | OUTPATIENT
Start: 2018-12-10 | End: 2018-12-13

## 2018-12-10 RX ORDER — HEPARIN SODIUM 5000 [USP'U]/ML
5000 INJECTION INTRAVENOUS; SUBCUTANEOUS EVERY 8 HOURS
Qty: 0 | Refills: 0 | Status: DISCONTINUED | OUTPATIENT
Start: 2018-12-10 | End: 2018-12-13

## 2018-12-10 RX ADMIN — Medication 50 MILLIGRAM(S): at 23:18

## 2018-12-10 RX ADMIN — PANTOPRAZOLE SODIUM 40 MILLIGRAM(S): 20 TABLET, DELAYED RELEASE ORAL at 16:23

## 2018-12-10 RX ADMIN — ZOLPIDEM TARTRATE 5 MILLIGRAM(S): 10 TABLET ORAL at 23:52

## 2018-12-10 RX ADMIN — Medication 650 MILLIGRAM(S): at 23:31

## 2018-12-10 RX ADMIN — Medication 15 MILLIGRAM(S): at 16:23

## 2018-12-10 RX ADMIN — ATORVASTATIN CALCIUM 20 MILLIGRAM(S): 80 TABLET, FILM COATED ORAL at 23:15

## 2018-12-10 NOTE — ED ADULT NURSE NOTE - OBJECTIVE STATEMENT
patient complaining of left leg pain x 4 weeks, states usually walks with a walker, but pain is worsening

## 2018-12-10 NOTE — ED ADULT NURSE NOTE - NSIMPLEMENTINTERV_GEN_ALL_ED
Implemented All Fall Risk Interventions:  [place] to call system. Call bell, personal items and telephone within reach. Instruct patient to call for assistance. Room bathroom lighting operational. Non-slip footwear when patient is off stretcher. Physically safe environment: no spills, clutter or unnecessary equipment. Stretcher in lowest position, wheels locked, appropriate side rails in place. Provide visual cue, wrist band, yellow gown, etc. Monitor gait and stability. Monitor for mental status changes and reorient to person, place, and time. Review medications for side effects contributing to fall risk. Reinforce activity limits and safety measures with patient and family.

## 2018-12-10 NOTE — H&P ADULT - NSHPREVIEWOFSYSTEMS_GEN_ALL_CORE
CONSTITUTIONAL: No fever, weight loss, or fatigue  EYES: No eye pain, visual disturbances, or discharge  ENMT:  No difficulty hearing, tinnitus, vertigo; No sinus or throat pain  NECK: No pain or stiffness  BREASTS: No pain, masses, or nipple discharge  RESPIRATORY: No cough, wheezing, chills or hemoptysis; No shortness of breath  CARDIOVASCULAR: No chest pain, palpitations, dizziness, or leg swelling  GASTROINTESTINAL: No abdominal or epigastric pain. No nausea, vomiting, or hematemesis; No diarrhea or constipation. No melena or hematochezia.  GENITOURINARY: No dysuria, frequency, hematuria, or incontinence  NEUROLOGICAL: No headaches, memory loss, loss of strength, numbness, or tremors  SKIN: No itching, burning, rashes, or lesions   LYMPH NODES: No enlarged glands  ENDOCRINE: No heat or cold intolerance; No hair loss  MUSCULOSKELETAL: ++ B/L leg pain (located behind the knees)   PSYCHIATRIC: No depression, anxiety, mood swings, or difficulty sleeping  HEME/LYMPH: No easy bruising, or bleeding gums  ALLERGY AND IMMUNOLOGIC: No hives or eczema

## 2018-12-10 NOTE — H&P ADULT - NSHPPHYSICALEXAM_GEN_ALL_CORE
ICU Vital Signs Last 24 Hrs  T(C): 37.9 (10 Dec 2018 15:37), Max: 37.9 (10 Dec 2018 15:37)  T(F): 100.2 (10 Dec 2018 15:37), Max: 100.2 (10 Dec 2018 15:37)  HR: 98 (10 Dec 2018 15:37) (98 - 98)  BP: 197/83 (10 Dec 2018 15:37) (197/83 - 197/83)  RR: 20 (10 Dec 2018 15:37) (20 - 20)  SpO2: 100% (10 Dec 2018 15:37) (100% - 100%)    EXAM  ====  GENERAL: NAD, well-groomed, well-developed  HEAD:  Atraumatic, Normocephalic  EYES: EOMI, PERRLA, conjunctiva and sclera clear  ENMT: No tonsillar erythema, exudates, or enlargement; Moist mucous membranes, Good dentition, No lesions  NECK: Supple, No JVD, Normal thyroid  NERVOUS SYSTEM:  Alert & Oriented X3, Good concentration; Motor Strength 4/5 B/Lower extremities;   CHEST/LUNG: Clear to percussion bilaterally; No rales, rhonchi, wheezing, or rubs  HEART: Regular rate and rhythm; + murmurs,   ABDOMEN: Soft, Nontender, Nondistended; Bowel sounds present  EXTREMITIES:  2+ Peripheral Pulses, No clubbing, cyanosis, +  edema  LYMPH: No lymphadenopathy noted  SKIN: No rashes or lesions

## 2018-12-10 NOTE — ED PROVIDER NOTE - OBJECTIVE STATEMENT
84 years old female by ems c/o bilateral L>R legs pain for four weeks. Pt sts she was seen by her doctor one week ago and was told the swelling and pain is due to water. Pt denies recent hx of trauma, headache, dizziness, blurred visions, light sensitivities, focal/distal weakness or numbness, cough, sob, chest pain, nausea, vomiting, fever, chills, abd pain, dysuria or irregular bowel movements.

## 2018-12-10 NOTE — H&P ADULT - HISTORY OF PRESENT ILLNESS
84 years old female by ems c/o bilateral L>R leg pain for four weeks. Pt sts she was seen by her doctor one week ago and was told the swelling and pain is due to water. Patient states that she has falling several times because of the leg pain and is unable to ambulate. She staes that she did hit her head the last time she fell. Pt denies recent hx of headache, dizziness, blurred visions, light sensitivities, focal/distal weakness or numbness, cough, sob, chest pain, nausea, vomiting, fever, chills, abd pain, dysuria or irregular bowel movements.

## 2018-12-10 NOTE — H&P ADULT - ASSESSMENT
84 year old female with PMH CAD, HTN, HLD , arthritis presents to ED with complaint of left > right leg pain.   Doppler shows partially ruptured bakers cyst that may be contributing to the pain.   Patient also states that she fell and hit head, has experienced dizziness  and leg swelling

## 2018-12-10 NOTE — H&P ADULT - NSHPLABSRESULTS_GEN_ALL_CORE
8.2    9.54  )-----------( 357      ( 10 Dec 2018 21:04 )             26.3     12-10    130<L>  |  91<L>  |  11  ----------------------------<  117<H>  3.7   |  28  |  0.65    Ca    8.6      10 Dec 2018 16:48  Mg     1.8     12-10    TPro  7.6  /  Alb  3.5  /  TBili  0.5  /  DBili  x   /  AST  35  /  ALT  21  /  AlkPhos  98  12-10    PT/INR - ( 10 Dec 2018 17:16 )   PT: 12.0 sec;   INR: 1.07 ratio         PTT - ( 10 Dec 2018 17:16 )  PTT:32.1 sec    CAPILLARY BLOOD GLUCOSE    < from: US Duplex Venous Lower Ext Complete, Bilateral (12.10.18 @ 19:01) >    IMPRESSION:   1. No evidence of bilateral lower extremity deep venous thrombosis.  2. Probable partially ruptured Baker's cyst in the left popliteal fossa -   with migration of fluid into calf region, as above.  This report to ER via PACs system.    < end of copied text >

## 2018-12-11 LAB
ANION GAP SERPL CALC-SCNC: 11 MMOL/L — SIGNIFICANT CHANGE UP (ref 5–17)
BASOPHILS # BLD AUTO: 0.02 K/UL — SIGNIFICANT CHANGE UP (ref 0–0.2)
BASOPHILS NFR BLD AUTO: 0.2 % — SIGNIFICANT CHANGE UP (ref 0–2)
BUN SERPL-MCNC: 10 MG/DL — SIGNIFICANT CHANGE UP (ref 7–23)
CALCIUM SERPL-MCNC: 8.1 MG/DL — LOW (ref 8.5–10.1)
CHLORIDE SERPL-SCNC: 92 MMOL/L — LOW (ref 96–108)
CO2 SERPL-SCNC: 26 MMOL/L — SIGNIFICANT CHANGE UP (ref 22–31)
CREAT SERPL-MCNC: 0.78 MG/DL — SIGNIFICANT CHANGE UP (ref 0.5–1.3)
EOSINOPHIL # BLD AUTO: 0 K/UL — SIGNIFICANT CHANGE UP (ref 0–0.5)
EOSINOPHIL NFR BLD AUTO: 0 % — SIGNIFICANT CHANGE UP (ref 0–6)
GLUCOSE SERPL-MCNC: 138 MG/DL — HIGH (ref 70–99)
HCT VFR BLD CALC: 25.6 % — LOW (ref 34.5–45)
HGB BLD-MCNC: 8 G/DL — LOW (ref 11.5–15.5)
IMM GRANULOCYTES NFR BLD AUTO: 0.3 % — SIGNIFICANT CHANGE UP (ref 0–1.5)
LYMPHOCYTES # BLD AUTO: 0.62 K/UL — LOW (ref 1–3.3)
LYMPHOCYTES # BLD AUTO: 6.5 % — LOW (ref 13–44)
MCHC RBC-ENTMCNC: 24.6 PG — LOW (ref 27–34)
MCHC RBC-ENTMCNC: 31.3 GM/DL — LOW (ref 32–36)
MCV RBC AUTO: 78.8 FL — LOW (ref 80–100)
MONOCYTES # BLD AUTO: 0.82 K/UL — SIGNIFICANT CHANGE UP (ref 0–0.9)
MONOCYTES NFR BLD AUTO: 8.6 % — SIGNIFICANT CHANGE UP (ref 2–14)
NEUTROPHILS # BLD AUTO: 8.05 K/UL — HIGH (ref 1.8–7.4)
NEUTROPHILS NFR BLD AUTO: 84.4 % — HIGH (ref 43–77)
NRBC # BLD: 0 /100 WBCS — SIGNIFICANT CHANGE UP (ref 0–0)
PLATELET # BLD AUTO: 359 K/UL — SIGNIFICANT CHANGE UP (ref 150–400)
POTASSIUM SERPL-MCNC: 3.4 MMOL/L — LOW (ref 3.5–5.3)
POTASSIUM SERPL-SCNC: 3.4 MMOL/L — LOW (ref 3.5–5.3)
RBC # BLD: 3.25 M/UL — LOW (ref 3.8–5.2)
RBC # FLD: 17.4 % — HIGH (ref 10.3–14.5)
SODIUM SERPL-SCNC: 129 MMOL/L — LOW (ref 135–145)
WBC # BLD: 8.03 K/UL — SIGNIFICANT CHANGE UP (ref 3.8–10.5)
WBC # FLD AUTO: 8.03 K/UL — SIGNIFICANT CHANGE UP (ref 3.8–10.5)

## 2018-12-11 PROCEDURE — 70450 CT HEAD/BRAIN W/O DYE: CPT | Mod: 26

## 2018-12-11 RX ORDER — POTASSIUM CHLORIDE 20 MEQ
20 PACKET (EA) ORAL ONCE
Qty: 0 | Refills: 0 | Status: COMPLETED | OUTPATIENT
Start: 2018-12-11 | End: 2018-12-11

## 2018-12-11 RX ADMIN — Medication 650 MILLIGRAM(S): at 21:57

## 2018-12-11 RX ADMIN — Medication 40 MILLIGRAM(S): at 11:42

## 2018-12-11 RX ADMIN — HEPARIN SODIUM 5000 UNIT(S): 5000 INJECTION INTRAVENOUS; SUBCUTANEOUS at 21:57

## 2018-12-11 RX ADMIN — Medication 650 MILLIGRAM(S): at 10:55

## 2018-12-11 RX ADMIN — ZOLPIDEM TARTRATE 5 MILLIGRAM(S): 10 TABLET ORAL at 21:57

## 2018-12-11 RX ADMIN — Medication 650 MILLIGRAM(S): at 11:00

## 2018-12-11 RX ADMIN — LISINOPRIL 20 MILLIGRAM(S): 2.5 TABLET ORAL at 07:45

## 2018-12-11 RX ADMIN — Medication 81 MILLIGRAM(S): at 11:42

## 2018-12-11 RX ADMIN — HEPARIN SODIUM 5000 UNIT(S): 5000 INJECTION INTRAVENOUS; SUBCUTANEOUS at 13:16

## 2018-12-11 RX ADMIN — Medication 650 MILLIGRAM(S): at 16:49

## 2018-12-11 RX ADMIN — Medication 650 MILLIGRAM(S): at 23:21

## 2018-12-11 RX ADMIN — Medication 20 MILLIEQUIVALENT(S): at 16:49

## 2018-12-11 RX ADMIN — ATORVASTATIN CALCIUM 20 MILLIGRAM(S): 80 TABLET, FILM COATED ORAL at 21:57

## 2018-12-11 RX ADMIN — Medication 40 MILLIGRAM(S): at 07:45

## 2018-12-11 RX ADMIN — Medication 50 MILLIGRAM(S): at 07:44

## 2018-12-11 RX ADMIN — PANTOPRAZOLE SODIUM 40 MILLIGRAM(S): 20 TABLET, DELAYED RELEASE ORAL at 10:38

## 2018-12-11 RX ADMIN — HEPARIN SODIUM 5000 UNIT(S): 5000 INJECTION INTRAVENOUS; SUBCUTANEOUS at 07:47

## 2018-12-11 NOTE — PHYSICAL THERAPY INITIAL EVALUATION ADULT - CRITERIA FOR SKILLED THERAPEUTIC INTERVENTIONS
predicted duration of therapy intervention/impairments found/functional limitations in following categories/anticipated discharge recommendation/rehab potential/risk reduction/prevention/anticipated equipment needs at discharge
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
## 2018-12-11 NOTE — PHYSICAL THERAPY INITIAL EVALUATION ADULT - IMPAIRMENTS FOUND, PT EVAL
gait, locomotion, and balance/joint integrity and mobility/neuromotor development and sensory integration/poor safety awareness/integumentary integrity/aerobic capacity/endurance/cognitive impairment/muscle strength/arousal, attention, and cognition/ROM
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
## 2018-12-11 NOTE — PHYSICAL THERAPY INITIAL EVALUATION ADULT - PLANNED THERAPY INTERVENTIONS, PT EVAL
transfer training/gait training/stretching/joint mobilization/neuromuscular re-education/postural re-education/ROM/balance training/bed mobility training/strengthening

## 2018-12-12 LAB
HCT VFR BLD CALC: 26.7 % — LOW (ref 34.5–45)
HGB BLD-MCNC: 8.1 G/DL — LOW (ref 11.5–15.5)
MCHC RBC-ENTMCNC: 23.8 PG — LOW (ref 27–34)
MCHC RBC-ENTMCNC: 30.3 GM/DL — LOW (ref 32–36)
MCV RBC AUTO: 78.3 FL — LOW (ref 80–100)
NRBC # BLD: 0 /100 WBCS — SIGNIFICANT CHANGE UP (ref 0–0)
OB PNL STL: NEGATIVE — SIGNIFICANT CHANGE UP
PLATELET # BLD AUTO: 331 K/UL — SIGNIFICANT CHANGE UP (ref 150–400)
RBC # BLD: 3.41 M/UL — LOW (ref 3.8–5.2)
RBC # FLD: 17.2 % — HIGH (ref 10.3–14.5)
WBC # BLD: 7.04 K/UL — SIGNIFICANT CHANGE UP (ref 3.8–10.5)
WBC # FLD AUTO: 7.04 K/UL — SIGNIFICANT CHANGE UP (ref 3.8–10.5)

## 2018-12-12 RX ADMIN — Medication 50 MILLIGRAM(S): at 06:47

## 2018-12-12 RX ADMIN — HEPARIN SODIUM 5000 UNIT(S): 5000 INJECTION INTRAVENOUS; SUBCUTANEOUS at 22:05

## 2018-12-12 RX ADMIN — LISINOPRIL 20 MILLIGRAM(S): 2.5 TABLET ORAL at 06:47

## 2018-12-12 RX ADMIN — Medication 650 MILLIGRAM(S): at 17:00

## 2018-12-12 RX ADMIN — Medication 650 MILLIGRAM(S): at 03:22

## 2018-12-12 RX ADMIN — ZOLPIDEM TARTRATE 5 MILLIGRAM(S): 10 TABLET ORAL at 22:07

## 2018-12-12 RX ADMIN — Medication 650 MILLIGRAM(S): at 16:39

## 2018-12-12 RX ADMIN — Medication 81 MILLIGRAM(S): at 11:34

## 2018-12-12 RX ADMIN — PANTOPRAZOLE SODIUM 40 MILLIGRAM(S): 20 TABLET, DELAYED RELEASE ORAL at 08:52

## 2018-12-12 RX ADMIN — Medication 40 MILLIGRAM(S): at 11:32

## 2018-12-12 RX ADMIN — Medication 50 MILLIGRAM(S): at 18:03

## 2018-12-12 RX ADMIN — HEPARIN SODIUM 5000 UNIT(S): 5000 INJECTION INTRAVENOUS; SUBCUTANEOUS at 06:47

## 2018-12-12 RX ADMIN — Medication 40 MILLIGRAM(S): at 06:47

## 2018-12-12 RX ADMIN — HEPARIN SODIUM 5000 UNIT(S): 5000 INJECTION INTRAVENOUS; SUBCUTANEOUS at 14:03

## 2018-12-12 RX ADMIN — ATORVASTATIN CALCIUM 20 MILLIGRAM(S): 80 TABLET, FILM COATED ORAL at 22:05

## 2018-12-12 NOTE — PROGRESS NOTE ADULT - SUBJECTIVE AND OBJECTIVE BOX
85 yo lady on meds for ASHD and HTN along with Systolic CHF not with pain in left knee and inability to walk.     Vital Signs Last 24 Hrs  T(C): 37.1 (12 Dec 2018 12:17), Max: 37.1 (12 Dec 2018 00:15)  T(F): 98.8 (12 Dec 2018 12:17), Max: 98.8 (12 Dec 2018 12:17)  HR: 87 (12 Dec 2018 12:44) (68 - 98)  BP: 127/68 (12 Dec 2018 12:17) (127/68 - 180/63)  BP(mean): --  RR: 19 (12 Dec 2018 12:44) (17 - 19)  SpO2: 95% (12 Dec 2018 12:44) (95% - 99%)                          8.1    7.04  )-----------( 331      ( 12 Dec 2018 07:02 )             26.7   12-11    129<L>  |  92<L>  |  10  ----------------------------<  138<H>  3.4<L>   |  26  |  0.78    Ca    8.1<L>      11 Dec 2018 01:44  Mg     1.8     12-10    TPro  7.6  /  Alb  3.5  /  TBili  0.5  /  DBili  x   /  AST  35  /  ALT  21  /  AlkPhos  98  12-10    MEDICATIONS  (STANDING):  aspirin enteric coated 81 milliGRAM(s) Oral daily  atorvastatin 20 milliGRAM(s) Oral at bedtime  FLUoxetine 40 milliGRAM(s) Oral daily  furosemide    Tablet 40 milliGRAM(s) Oral daily  heparin  Injectable 5000 Unit(s) SubCutaneous every 8 hours  lisinopril 20 milliGRAM(s) Oral daily  metoprolol tartrate 50 milliGRAM(s) Oral two times a day  pantoprazole    Tablet 40 milliGRAM(s) Oral before breakfast

## 2018-12-13 ENCOUNTER — TRANSCRIPTION ENCOUNTER (OUTPATIENT)
Age: 83
End: 2018-12-13

## 2018-12-13 VITALS
RESPIRATION RATE: 18 BRPM | SYSTOLIC BLOOD PRESSURE: 166 MMHG | OXYGEN SATURATION: 98 % | HEART RATE: 73 BPM | TEMPERATURE: 99 F | DIASTOLIC BLOOD PRESSURE: 49 MMHG

## 2018-12-13 RX ADMIN — Medication 650 MILLIGRAM(S): at 13:30

## 2018-12-13 RX ADMIN — Medication 50 MILLIGRAM(S): at 05:32

## 2018-12-13 RX ADMIN — HEPARIN SODIUM 5000 UNIT(S): 5000 INJECTION INTRAVENOUS; SUBCUTANEOUS at 05:31

## 2018-12-13 RX ADMIN — Medication 40 MILLIGRAM(S): at 05:31

## 2018-12-13 RX ADMIN — Medication 650 MILLIGRAM(S): at 14:30

## 2018-12-13 RX ADMIN — Medication 40 MILLIGRAM(S): at 13:28

## 2018-12-13 RX ADMIN — HEPARIN SODIUM 5000 UNIT(S): 5000 INJECTION INTRAVENOUS; SUBCUTANEOUS at 13:28

## 2018-12-13 RX ADMIN — PANTOPRAZOLE SODIUM 40 MILLIGRAM(S): 20 TABLET, DELAYED RELEASE ORAL at 08:48

## 2018-12-13 RX ADMIN — Medication 81 MILLIGRAM(S): at 13:27

## 2018-12-13 RX ADMIN — LISINOPRIL 20 MILLIGRAM(S): 2.5 TABLET ORAL at 05:32

## 2018-12-13 NOTE — DISCHARGE NOTE ADULT - MEDICATION SUMMARY - MEDICATIONS TO TAKE
I will START or STAY ON the medications listed below when I get home from the hospital:    acetaminophen 325 mg oral tablet  -- 2 tab(s) by mouth every 6 hours, As needed, Mild Pain (1 - 3)  -- Indication: For Pain    aspirin 81 mg oral delayed release tablet  -- 1 tab(s) by mouth once a day  -- Indication: For cardiac protection    traMADol 50 mg oral tablet  -- 1 tab(s) by mouth every 4 hours, As needed, Mild Pain (1 - 3)  -- Indication: For Leg pain    lisinopril 20 mg oral tablet  -- 1 tab(s) by mouth once a day  -- Indication: For Hypertension    FLUoxetine 40 mg oral capsule  -- 1 cap(s) by mouth once a day  -- Indication: For mood disorder    atorvastatin 20 mg oral tablet  -- 1 tab(s) by mouth once a day (at bedtime)  -- Indication: For cholesterol    Ambien 5 mg oral tablet  -- 1 tab(s) by mouth once a day (at bedtime), As Needed  -- Indication: For sleep aid    metoprolol tartrate 50 mg oral tablet  -- 1 tab(s) by mouth 2 times a day  -- Indication: For Hypertension    albuterol 90 mcg/inh inhalation aerosol  -- 1 puff(s) inhaled every 4 hours, As needed, Shortness of Breath and/or Wheezing  -- Indication: For COPD    furosemide 40 mg oral tablet  -- 1 tab(s) by mouth once a day  -- Indication: For Heart failure    Innerclean oral tablet  -- 2 tab(s) by mouth once a day (at bedtime), As needed, Constipation  -- Indication: For Laxative    K-Tab 10 mEq oral tablet, extended release  -- 1 tab(s) by mouth once a day  -- It is very important that you take or use this exactly as directed.  Do not skip doses or discontinue unless directed by your doctor.  Medication should be taken with plenty of water.  Take with food or milk.    -- Indication: For Potassium supplement     pantoprazole 40 mg oral delayed release tablet  -- 1 tab(s) by mouth once a day (before a meal)  -- Indication: For reflux

## 2018-12-13 NOTE — DISCHARGE NOTE ADULT - CARE PROVIDER_API CALL
Alejo Stuart (MD), Medicine  [address] [place] [address]  Phone: [phone]  Fax: [phone]

## 2018-12-13 NOTE — DISCHARGE NOTE ADULT - PLAN OF CARE
resolution of pain US completed,   shows partial rupture of bakers cyst.  Rehab for further therapy rehab continue current BP medications lisinopril and metoprolol continue heart failure continue PPI, protonix

## 2018-12-13 NOTE — DISCHARGE NOTE ADULT - CARE PLAN
Principal Discharge DX:	Leg pain  Goal:	resolution of pain  Assessment and plan of treatment:	US completed,   shows partial rupture of bakers cyst.  Rehab for further therapy  Secondary Diagnosis:	Fall  Assessment and plan of treatment:	rehab  Secondary Diagnosis:	Hypertension  Assessment and plan of treatment:	continue current BP medications lisinopril and metoprolol  Secondary Diagnosis:	Heart failure  Assessment and plan of treatment:	continue heart failure  Secondary Diagnosis:	Gastroesophageal reflux disease without esophagitis  Assessment and plan of treatment:	continue PPI, protonix

## 2018-12-13 NOTE — DISCHARGE NOTE ADULT - HOSPITAL COURSE
84 years old female by ems c/o bilateral L>R leg pain for four weeks. Pt sts she was seen by her doctor one week ago and was told the swelling and pain is due to water. Patient states that she has falling several times because of the leg pain and is unable to ambulate. Patient was admitted to medicine, US lower extremities shows partially ruptured bakers cyst. Physical therapy evaluation, suggested rehab. Echo completed  < from: [facility]TE Echo Doppler w/o Cont (12.11.18 @ 08:58) >  Summary:   1. Left ventricular ejection fraction, by visual estimation, is 55 to   60%.   2. There is no left ventricular hypertrophy.   3. Mild mitral annular calcification.   4. Thickening of the anterior and posterior mitral valve leaflets.   5. Moderate tricuspid regurgitation.   6. Moderate aortic regurgitation.   7. Sclerotic aortic valve with normal opening.   8. Estimated pulmonary artery systolic pressure is 51.8 mmHg assuming a   right atrial pressure of 5 mmHg, which is consistent with moderate   pulmonary hypertension.   9. The aortic valve mean gradient is 7.1 mmHg consistent with normally   opening aortic valve.    CT head negative . Patient to discharge for further rehabilitation.

## 2018-12-17 DIAGNOSIS — D64.9 ANEMIA, UNSPECIFIED: ICD-10-CM

## 2018-12-17 DIAGNOSIS — I50.20 UNSPECIFIED SYSTOLIC (CONGESTIVE) HEART FAILURE: ICD-10-CM

## 2018-12-17 DIAGNOSIS — F39 UNSPECIFIED MOOD [AFFECTIVE] DISORDER: ICD-10-CM

## 2018-12-17 DIAGNOSIS — I25.10 ATHEROSCLEROTIC HEART DISEASE OF NATIVE CORONARY ARTERY WITHOUT ANGINA PECTORIS: ICD-10-CM

## 2018-12-17 DIAGNOSIS — M66.0 RUPTURE OF POPLITEAL CYST: ICD-10-CM

## 2018-12-17 DIAGNOSIS — Z95.1 PRESENCE OF AORTOCORONARY BYPASS GRAFT: ICD-10-CM

## 2018-12-17 DIAGNOSIS — E78.5 HYPERLIPIDEMIA, UNSPECIFIED: ICD-10-CM

## 2018-12-17 DIAGNOSIS — K21.9 GASTRO-ESOPHAGEAL REFLUX DISEASE WITHOUT ESOPHAGITIS: ICD-10-CM

## 2018-12-17 DIAGNOSIS — J44.9 CHRONIC OBSTRUCTIVE PULMONARY DISEASE, UNSPECIFIED: ICD-10-CM

## 2018-12-17 DIAGNOSIS — Z79.82 LONG TERM (CURRENT) USE OF ASPIRIN: ICD-10-CM

## 2018-12-17 DIAGNOSIS — G47.00 INSOMNIA, UNSPECIFIED: ICD-10-CM

## 2018-12-17 DIAGNOSIS — Z96.653 PRESENCE OF ARTIFICIAL KNEE JOINT, BILATERAL: ICD-10-CM

## 2018-12-17 DIAGNOSIS — Z91.81 HISTORY OF FALLING: ICD-10-CM

## 2018-12-17 DIAGNOSIS — R26.81 UNSTEADINESS ON FEET: ICD-10-CM

## 2018-12-17 DIAGNOSIS — I11.0 HYPERTENSIVE HEART DISEASE WITH HEART FAILURE: ICD-10-CM

## 2018-12-17 DIAGNOSIS — I25.2 OLD MYOCARDIAL INFARCTION: ICD-10-CM

## 2018-12-17 DIAGNOSIS — Z79.51 LONG TERM (CURRENT) USE OF INHALED STEROIDS: ICD-10-CM

## 2018-12-18 NOTE — ED ADULT NURSE NOTE - READING LANGUAGE PREFERRED
English

## 2018-12-24 ENCOUNTER — OUTPATIENT (OUTPATIENT)
Dept: OUTPATIENT SERVICES | Facility: HOSPITAL | Age: 83
LOS: 1 days | Discharge: HOME | End: 2018-12-24
Payer: MEDICARE

## 2018-12-24 DIAGNOSIS — Z96.653 PRESENCE OF ARTIFICIAL KNEE JOINT, BILATERAL: Chronic | ICD-10-CM

## 2018-12-24 DIAGNOSIS — J18.9 PNEUMONIA, UNSPECIFIED ORGANISM: ICD-10-CM

## 2018-12-24 DIAGNOSIS — Z95.1 PRESENCE OF AORTOCORONARY BYPASS GRAFT: Chronic | ICD-10-CM

## 2018-12-24 PROCEDURE — 71045 X-RAY EXAM CHEST 1 VIEW: CPT | Mod: 26

## 2019-03-06 NOTE — BEHAVIORAL HEALTH ASSESSMENT NOTE - INSIGHT (REGARDING PSYCHIATRIC ILLNESS)
"GERIATRIC MEDICINE HISTORY AND PHYSICAL   Corrie Mcgowan   80year old male   MRN: [id number]   : 1938     CHIEF COMPLAINT:   Chief Complaint   Patient presents with   â¢ Follow-up     3 month medication check. discuss edema and gynecomastia. No pain today     HISTORY OF PRESENT ILLNESS:   Corrie Mcgowan, 80year old,   is seen accompanied by his wife Deirdre Boggs for follow-up of several medical problems. 1) peripheral edema-multiple etiologies. Doing very well on current dose of Lasix. Laboratory tests stable. Weight remained stable between 150-155 pounds. 2) gynecomastia-had requested some lab for a work to look at the etiology of this further. His mammogram was negative. They did not get the lab work done and will be doing this today. 3) mixed dementia with aphasia that she continues to be difficult to interpret his level of dementia although functionally he is continuing to decline. He is starting to get people more confused. He cannot follow directions that are simple such as putting a dish towel in the . He has increasing receptive aphasia as well as expressive aphasia. She is trying to get him more involved in things such as the senior center in senior living apartment. He continues to feel as if he can go back to ""normal\"" if he was just involved with things such as going back to work. We talked about realistic expectations which did not make max very happy today. This was discussed in some depth with him. He was willing to and interested in getting speech therapy at this time. 4) osteoarthritis and psoriatic arthritis-on methotrexate and being followed by [facility] in [facility] 5) history of ulcerative colitis-this has been very stable without any recent problems and followed by gastroenterology as needed. 6) hypothyroidism-again has not had his laboratory work done as recommended and will be doing this today.     REVIEW OF SYSTEMS:   Unable to get a meaningful review of systems due " to the degree of dementia. Per family concerns are as per above. ALLERGIES:  Namenda [memantine hcl]     MEDICATIONS:   Current Outpatient Medications   Medication Sig Dispense Refill   â¢ methotrexate (RHEUMATREX) 2.5 MG tablet Take 2.5 mg by mouth 1 day a week. 1   â¢ folic acid (FOLATE) 1 MG tablet Take 1 mg by mouth every 24 hours. â¢ furosemide (LASIX) 40 MG tablet Take 1 tablet by mouth daily. 30 tablet 11   â¢ donepezil (ARICEPT) 10 MG tablet Take 1 tablet by mouth daily. 90 tablet 1   â¢ atorvastatin (LIPITOR) 20 MG tablet TAKE 1 TABLET BY MOUTH DAILY 90 tablet 3   â¢ levothyroxine (SYNTHROID, LEVOTHROID) 125 MCG tablet TAKE 1 TABLET BY MOUTH DAILY 90 tablet 3   â¢ meloxicam (MOBIC) 7.5 MG tablet TAKE 1 TABLET BY MOUTH DAILY 90 tablet 3   â¢ doxazosin (CARDURA) 2 MG tablet TAKE 1 TABLET BY MOUTH EVERY EVENING (Patient taking differently: TAKE 1 TABLET BY MOUTH EVERY day) 90 tablet 3   â¢ Ascorbic Acid (VITAMIN C) 500 MG tablet Take 500 mg by mouth daily. â¢ cholecalciferol (VITAMIN D3) 1000 UNITS tablet Take 5,000 Units by mouth daily. â¢ acetaminophen (TYLENOL) 500 MG tablet Take 1,000 mg by mouth daily as needed for Pain. â¢ traMADol (ULTRAM) 50 MG tablet Take 1 tablet by mouth every 8 hours as needed for Pain. 90 tablet 1     No current facility-administered medications for this visit.           PAST MEDICAL HISTORY:   Past Medical History:   Diagnosis Date   â¢ Benign prostatic hypertrophy with urinary obstruction    â¢ BPH (benign prostatic hyperplasia)    â¢ CAD (coronary artery disease)    â¢ Carotid artery stenosis    â¢ Chronic anemia    â¢ Colon polyps    â¢ Colon polyps    â¢ Coronary artery disease, non-occlusive    â¢ Elevated PSA     prostate biopsy 09/18/2013 only showed chronic inflammation   â¢ Erectile dysfunction    â¢ Expressive aphasia 11/29/2017   â¢ Hypercholesteremia    â¢ Hyperlipidemia    â¢ Hypothyroidism    â¢ Hypothyroidism    â¢ Laceration of lower leg, left, complicated    â¢ Low back pain    â¢ Lumbar spinal stenosis 11/19/2012    moderate to severe canal stenosis at L3-L4 and L4-L5    â¢ Lumbar spinal stenosis    â¢ Lumbar spondylosis    â¢ Mild bilateral carpal tunnel syndrome    â¢ Mild hypertension    â¢ Mild to moderate atherosclerosis of abdominal aorta 11/19/2012   â¢ Moderate stenosis of right carotid artery     40 to 59% on 10/06/2010.  50-69% 11/07/2013.    â¢ Multiple lipomas    â¢ Osteoarthritis of both hips    â¢ Osteoporosis 11/21/2013   â¢ Osteoporosis    â¢ Psoriasis    â¢ Psoriasis    â¢ Psoriatic arthritis (CMS/HCC)    â¢ Psoriatic arthritis (CMS/HCC)    â¢ Raynaud's syndrome    â¢ Sciatica    â¢ Shingles of the right face    â¢ Trochanteric bursitis of both hips    â¢ Ulcerative colitis (CMS/HCC)    â¢ Ulcerative colitis (CMS/HCC)    â¢ Vitamin D deficiency    â¢ Vitamin D deficiency           PAST SURGICAL HISTORY:   Past Surgical History:   Procedure Laterality Date   â¢ Bd dexa axial skeleton  02/19/2010    osteopenia   â¢ Cataract extraction w/ intraocular lens implant Right 2010   â¢ Colonoscopy diagnostic  01/01/2007   â¢ Colonoscopy w/ polypectomy  06/22/2011    2 polyps and localized colitis in the sigmoid colon   â¢ Dexa bone density axial skeleton  11/21/2013    osteoporosis   â¢ Inguinal hernia repair      right   â¢ Lumbar epidural injection  11/21/2012   â¢ Multiple tooth extractions     â¢ Prostate biopsy  09/18/2013    chronic inflammation, no cancer evident   â¢ Tonsillectomy and adenoidectomy     â¢ Wrist surgery      fracture - age 10          FAMILY HISTORY:   Family History   Problem Relation Age of Onset   â¢ Myocardial Infarction Father 48        3 MIs, 3rd was fatal   â¢ Hypertension Father    â¢ Heart Father    â¢ Aneurysm Mother 80        Aortic Aneurysm   â¢ Hypertension Mother    â¢ Hypertension Other         sibling   â¢ Obesity Other         sibling   â¢ Cancer, Breast Sister    â¢ Hypertension Sister    â¢ Hypertension Brother           PERSONAL AND SOCIAL HISTORY:   Social History "    Socioeconomic History   â¢ Marital status: /Civil Union     Spouse name: Not on file   â¢ Number of children: 2   â¢ Years of education: Not on file   â¢ Highest education level: Not on file   Social Needs   â¢ Financial resource strain: Not on file   â¢ Food insecurity - worry: Not on file   â¢ Food insecurity - inability: Not on file   â¢ Transportation needs - medical: Not on file   â¢ Transportation needs - non-medical: Not on file   Occupational History   â¢ Occupation: manages [employer]   Tobacco Use   â¢ Smoking status: Former Smoker     Packs/day: 1.00     Years: 30.00     Pack years: 30.00     Types: Cigarettes     Last attempt to quit: 1982     Years since quittin.2   â¢ Smokeless tobacco: Never Used   Substance and Sexual Activity   â¢ Alcohol use: No     Comment: Fairly heavy use until . â¢ Drug use: No   â¢ Sexual activity: Not on file   Other Topics Concern   â¢  Service Not Asked   â¢ Blood Transfusions Not Asked   â¢ Caffeine Concern Not Asked   â¢ Occupational Exposure Not Asked   â¢ [place] Hazards Not Asked   â¢ Sleep Concern Not Asked   â¢ Stress Concern Not Asked   â¢ Weight Concern Not Asked   â¢ Special Diet Not Asked   â¢ Back Care Not Asked   â¢ Exercise Not Asked   â¢ Bike Helmet Not Asked   â¢ Seat Belt Yes   â¢ Self-Exams Not Asked   Social History Narrative    He is on the [place]. He is a recreational . He is originally from [place]. He has a son and daughter. He has 2 siblings. PHYSICAL EXAMINATION:   VITALS:   Visit Vitals  /70 ([place] [facility] [place])   Pulse 52   Ht 5' 5"" (1.651 m)   Wt 74.4 kg   BMI 27.29 kg/mÂ²        Wt Readings from Last 4 Encounters:   19 74.4 kg   18 72.4 kg   18 83.7 kg   18 84.4 kg           GEN: AAO x 1. No acute pain or distress. Moderately severe aphasia  EXT:  No edema. Pedal pulses present B/SKIN:  Warm and dry. No erythema.    NEURO: obvious " cogntive deficits. No focal abnormality   PSYCH:  Mood and affect is good. GAIT: Stance and gait are normal.     Component      Latest Ref Rng & Units 3/6/2019   Fasting Status      hrs 0   Sodium      135 - 145 mmol/L 141   Potassium      3.4 - 5.1 mmol/L 5.1   Chloride      98 - 107 mmol/L 104   CO2      21 - 32 mmol/L 29   ANION GAP      10 - 20 mmol/L 13   Glucose      65 - 99 mg/dL 96   BUN      6 - 20 mg/dL 25 (H)   Creatinine      0.67 - 1.17 mg/dL 1.21 (H)   GFR Estimate,        65   GFR Estimate, Non African American       56   BUN/CREATININE RATIO      7 - 25 21   CALCIUM      8.4 - 10.2 mg/dL 9.4   TOTAL BILIRUBIN      0.2 - 1.0 mg/dL 0.5   AST/SGOT      <38 Units/L 26   ALT/SGPT      <79 Units/L 28   ALK PHOSPHATASE      45 - 117 Units/L 78   TOTAL PROTEIN      6.4 - 8.2 g/dL 6.7   Albumin      3.6 - 5.1 g/dL 3.6   GLOBULIN      2.0 - 4.0 g/dL 3.1   A/G Ratio, Serum      1.0 - 2.4 1.2   TSH      0.350 - 5.000 mcUnits/mL 11.910 (H)   PROLACTIN      2.1 - 17.7 ng/mL 6.0   T4, FREE      0.8 - 1.5 ng/dL 0.8   TRIIODOTHYRONINE, FREE      2.2 - 4.0 pg/mL 1.9 (L)   Testosterone Free Males ED       39.2 (L)   Testosterone Total Males ED       422.0     ASSESSMENT:   Expressive aphasia  Progressive secondary to mixed dementia   - SERVICE TO SPEECH THERAPY    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  Progressive, increasing aphasia as well as cognitive decline  - SERVICE TO SPEECH THERAPY  - COMPREHENSIVE METABOLIC PANEL;  Future    Acquired hypothyroidism    - FREE T3; Future  - FREE T4; Future    Gynecomastia  Discussed need for laboratory work to further evaluate, he was resistant but finally agreed   - FREE T3; Future  - FREE T4; Future    Essential hypertension  stable    Psoriatic arthritis (CMS/HCC)  As per rheumatology        PLAN:   Patient Instructions   Speech therapy consultation to try and work with aphasia     You need to take advantage of opportunities with men's clubs, activities in complex and activities at the [facility]     Lab work today to look for other causes of gynecomastia     Follow up in 6 months with memory follow up       Total time spent is more than 45 minutes, with more than 50% of the time spent in coordination of care, counseling, review of records and discussion of plan of care with the patient/ staff/ family.      Ban Velazco MD [place]

## 2019-08-19 NOTE — ED ADULT TRIAGE NOTE - NS ED NURSE DIRECT TO ROOM YN
Unresponsive - The patient is nonverbal and does not respond even when a painful stimulus is applied. No

## 2019-08-22 ENCOUNTER — INPATIENT (INPATIENT)
Facility: HOSPITAL | Age: 84
LOS: 11 days | Discharge: HOME | End: 2019-09-03
Attending: INTERNAL MEDICINE | Admitting: INTERNAL MEDICINE
Payer: MEDICARE

## 2019-08-22 VITALS
DIASTOLIC BLOOD PRESSURE: 93 MMHG | WEIGHT: 132.06 LBS | HEART RATE: 97 BPM | TEMPERATURE: 98 F | SYSTOLIC BLOOD PRESSURE: 195 MMHG | OXYGEN SATURATION: 100 % | RESPIRATION RATE: 18 BRPM | HEIGHT: 60 IN

## 2019-08-22 DIAGNOSIS — Z95.1 PRESENCE OF AORTOCORONARY BYPASS GRAFT: Chronic | ICD-10-CM

## 2019-08-22 DIAGNOSIS — Z96.653 PRESENCE OF ARTIFICIAL KNEE JOINT, BILATERAL: Chronic | ICD-10-CM

## 2019-08-22 LAB
ALBUMIN SERPL ELPH-MCNC: 3.6 G/DL — SIGNIFICANT CHANGE UP (ref 3.3–5)
ALP SERPL-CCNC: 71 U/L — SIGNIFICANT CHANGE UP (ref 40–120)
ALT FLD-CCNC: 27 U/L — SIGNIFICANT CHANGE UP (ref 12–78)
ANION GAP SERPL CALC-SCNC: 9 MMOL/L — SIGNIFICANT CHANGE UP (ref 5–17)
APTT BLD: 30.7 SEC — SIGNIFICANT CHANGE UP (ref 28.5–37)
AST SERPL-CCNC: 29 U/L — SIGNIFICANT CHANGE UP (ref 15–37)
BILIRUB SERPL-MCNC: 0.6 MG/DL — SIGNIFICANT CHANGE UP (ref 0.2–1.2)
BUN SERPL-MCNC: 12 MG/DL — SIGNIFICANT CHANGE UP (ref 7–23)
CALCIUM SERPL-MCNC: 8.2 MG/DL — LOW (ref 8.5–10.1)
CHLORIDE SERPL-SCNC: 92 MMOL/L — LOW (ref 96–108)
CK MB BLD-MCNC: 1.2 % — SIGNIFICANT CHANGE UP (ref 0–3.5)
CK MB CFR SERPL CALC: 1.1 NG/ML — SIGNIFICANT CHANGE UP (ref 0.5–3.6)
CK SERPL-CCNC: 89 U/L — SIGNIFICANT CHANGE UP (ref 26–192)
CO2 SERPL-SCNC: 27 MMOL/L — SIGNIFICANT CHANGE UP (ref 22–31)
CREAT SERPL-MCNC: 0.79 MG/DL — SIGNIFICANT CHANGE UP (ref 0.5–1.3)
GLUCOSE SERPL-MCNC: 129 MG/DL — HIGH (ref 70–99)
HCT VFR BLD CALC: 33.6 % — LOW (ref 34.5–45)
HGB BLD-MCNC: 11 G/DL — LOW (ref 11.5–15.5)
INR BLD: 1.05 RATIO — SIGNIFICANT CHANGE UP (ref 0.88–1.16)
MCHC RBC-ENTMCNC: 26.2 PG — LOW (ref 27–34)
MCHC RBC-ENTMCNC: 32.7 GM/DL — SIGNIFICANT CHANGE UP (ref 32–36)
MCV RBC AUTO: 80 FL — SIGNIFICANT CHANGE UP (ref 80–100)
NRBC # BLD: 0 /100 WBCS — SIGNIFICANT CHANGE UP (ref 0–0)
NT-PROBNP SERPL-SCNC: HIGH PG/ML (ref 0–450)
PLATELET # BLD AUTO: 269 K/UL — SIGNIFICANT CHANGE UP (ref 150–400)
POTASSIUM SERPL-MCNC: 3.4 MMOL/L — LOW (ref 3.5–5.3)
POTASSIUM SERPL-SCNC: 3.4 MMOL/L — LOW (ref 3.5–5.3)
PROT SERPL-MCNC: 7.2 GM/DL — SIGNIFICANT CHANGE UP (ref 6–8.3)
PROTHROM AB SERPL-ACNC: 11.8 SEC — SIGNIFICANT CHANGE UP (ref 10–12.9)
RBC # BLD: 4.2 M/UL — SIGNIFICANT CHANGE UP (ref 3.8–5.2)
RBC # FLD: 16.7 % — HIGH (ref 10.3–14.5)
SODIUM SERPL-SCNC: 128 MMOL/L — LOW (ref 135–145)
TROPONIN I SERPL-MCNC: 0.04 NG/ML — SIGNIFICANT CHANGE UP (ref 0.01–0.04)
WBC # BLD: 6.39 K/UL — SIGNIFICANT CHANGE UP (ref 3.8–10.5)
WBC # FLD AUTO: 6.39 K/UL — SIGNIFICANT CHANGE UP (ref 3.8–10.5)

## 2019-08-22 PROCEDURE — 93010 ELECTROCARDIOGRAM REPORT: CPT

## 2019-08-22 PROCEDURE — 99285 EMERGENCY DEPT VISIT HI MDM: CPT

## 2019-08-22 RX ORDER — FUROSEMIDE 40 MG
40 TABLET ORAL ONCE
Refills: 0 | Status: COMPLETED | OUTPATIENT
Start: 2019-08-22 | End: 2019-08-22

## 2019-08-22 NOTE — ED ADULT NURSE NOTE - NSIMPLEMENTINTERV_GEN_ALL_ED
Implemented All Fall with Harm Risk Interventions:  [place] to call system. Call bell, personal items and telephone within reach. Instruct patient to call for assistance. Room bathroom lighting operational. Non-slip footwear when patient is off stretcher. Physically safe environment: no spills, clutter or unnecessary equipment. Stretcher in lowest position, wheels locked, appropriate side rails in place. Provide visual cue, wrist band, yellow gown, etc. Monitor gait and stability. Monitor for mental status changes and reorient to person, place, and time. Review medications for side effects contributing to fall risk. Reinforce activity limits and safety measures with patient and family. Provide visual clues: red socks.

## 2019-08-22 NOTE — ED PROVIDER NOTE - OBJECTIVE STATEMENT
85 year old female presents today c/o four day h/o lower chest pressure with sob and DILLARD (-) nausea or vomiting (-) dizzy or lightheaded (-) diaphoretic, pt is prescribed lasix but has not taken it in over a month due to insurance problems

## 2019-08-22 NOTE — ED PROVIDER NOTE - PSYCHIATRIC, MLM
Pt complaining of pain; chills. Alert and oriented to person, place, time/situation. normal mood and affect. no apparent risk to self or others.

## 2019-08-23 LAB
ANION GAP SERPL CALC-SCNC: 9 MMOL/L — SIGNIFICANT CHANGE UP (ref 5–17)
BASE EXCESS BLDA CALC-SCNC: 4.5 MMOL/L — HIGH (ref -2–2)
BLOOD GAS COMMENTS: SIGNIFICANT CHANGE UP
BLOOD GAS SOURCE: SIGNIFICANT CHANGE UP
BUN SERPL-MCNC: 12 MG/DL — SIGNIFICANT CHANGE UP (ref 7–23)
CALCIUM SERPL-MCNC: 8.3 MG/DL — LOW (ref 8.5–10.1)
CHLORIDE SERPL-SCNC: 95 MMOL/L — LOW (ref 96–108)
CO2 SERPL-SCNC: 28 MMOL/L — SIGNIFICANT CHANGE UP (ref 22–31)
CREAT SERPL-MCNC: 0.7 MG/DL — SIGNIFICANT CHANGE UP (ref 0.5–1.3)
GLUCOSE SERPL-MCNC: 100 MG/DL — HIGH (ref 70–99)
HCO3 BLDA-SCNC: 27 MMOL/L — SIGNIFICANT CHANGE UP (ref 21–29)
HOROWITZ INDEX BLDA+IHG-RTO: 28 — SIGNIFICANT CHANGE UP
PCO2 BLDA: 36 MMHG — SIGNIFICANT CHANGE UP (ref 32–46)
PH BLD: 7.5 — HIGH (ref 7.35–7.45)
PO2 BLDA: 80 MMHG — SIGNIFICANT CHANGE UP (ref 74–108)
POTASSIUM SERPL-MCNC: 3.1 MMOL/L — LOW (ref 3.5–5.3)
POTASSIUM SERPL-SCNC: 3.1 MMOL/L — LOW (ref 3.5–5.3)
SAO2 % BLDA: 96 % — SIGNIFICANT CHANGE UP (ref 92–96)
SODIUM SERPL-SCNC: 132 MMOL/L — LOW (ref 135–145)

## 2019-08-23 PROCEDURE — 71045 X-RAY EXAM CHEST 1 VIEW: CPT | Mod: 26

## 2019-08-23 PROCEDURE — 99223 1ST HOSP IP/OBS HIGH 75: CPT

## 2019-08-23 RX ORDER — ASPIRIN/CALCIUM CARB/MAGNESIUM 324 MG
81 TABLET ORAL DAILY
Refills: 0 | Status: DISCONTINUED | OUTPATIENT
Start: 2019-08-23 | End: 2019-09-03

## 2019-08-23 RX ORDER — ZOLPIDEM TARTRATE 10 MG/1
5 TABLET ORAL ONCE
Refills: 0 | Status: DISCONTINUED | OUTPATIENT
Start: 2019-08-23 | End: 2019-08-23

## 2019-08-23 RX ORDER — FUROSEMIDE 40 MG
40 TABLET ORAL
Refills: 0 | Status: DISCONTINUED | OUTPATIENT
Start: 2019-08-23 | End: 2019-08-25

## 2019-08-23 RX ORDER — FLUOXETINE HCL 10 MG
40 CAPSULE ORAL DAILY
Refills: 0 | Status: DISCONTINUED | OUTPATIENT
Start: 2019-08-23 | End: 2019-09-03

## 2019-08-23 RX ORDER — LISINOPRIL 2.5 MG/1
20 TABLET ORAL DAILY
Refills: 0 | Status: DISCONTINUED | OUTPATIENT
Start: 2019-08-23 | End: 2019-09-03

## 2019-08-23 RX ORDER — ENOXAPARIN SODIUM 100 MG/ML
40 INJECTION SUBCUTANEOUS DAILY
Refills: 0 | Status: DISCONTINUED | OUTPATIENT
Start: 2019-08-23 | End: 2019-09-03

## 2019-08-23 RX ORDER — POTASSIUM CHLORIDE 20 MEQ
40 PACKET (EA) ORAL EVERY 4 HOURS
Refills: 0 | Status: COMPLETED | OUTPATIENT
Start: 2019-08-23 | End: 2019-08-23

## 2019-08-23 RX ORDER — ACETAMINOPHEN 500 MG
650 TABLET ORAL EVERY 6 HOURS
Refills: 0 | Status: DISCONTINUED | OUTPATIENT
Start: 2019-08-23 | End: 2019-09-03

## 2019-08-23 RX ORDER — ATORVASTATIN CALCIUM 80 MG/1
20 TABLET, FILM COATED ORAL AT BEDTIME
Refills: 0 | Status: DISCONTINUED | OUTPATIENT
Start: 2019-08-23 | End: 2019-09-03

## 2019-08-23 RX ORDER — ZOLPIDEM TARTRATE 10 MG/1
5 TABLET ORAL AT BEDTIME
Refills: 0 | Status: DISCONTINUED | OUTPATIENT
Start: 2019-08-23 | End: 2019-08-30

## 2019-08-23 RX ORDER — METOPROLOL TARTRATE 50 MG
50 TABLET ORAL
Refills: 0 | Status: DISCONTINUED | OUTPATIENT
Start: 2019-08-23 | End: 2019-09-03

## 2019-08-23 RX ORDER — PANTOPRAZOLE SODIUM 20 MG/1
40 TABLET, DELAYED RELEASE ORAL
Refills: 0 | Status: DISCONTINUED | OUTPATIENT
Start: 2019-08-23 | End: 2019-09-03

## 2019-08-23 RX ADMIN — ATORVASTATIN CALCIUM 20 MILLIGRAM(S): 80 TABLET, FILM COATED ORAL at 21:47

## 2019-08-23 RX ADMIN — Medication 40 MILLIEQUIVALENT(S): at 10:11

## 2019-08-23 RX ADMIN — Medication 40 MILLIGRAM(S): at 05:34

## 2019-08-23 RX ADMIN — Medication 650 MILLIGRAM(S): at 13:07

## 2019-08-23 RX ADMIN — Medication 40 MILLIGRAM(S): at 18:06

## 2019-08-23 RX ADMIN — Medication 650 MILLIGRAM(S): at 07:09

## 2019-08-23 RX ADMIN — Medication 50 MILLIGRAM(S): at 18:06

## 2019-08-23 RX ADMIN — Medication 650 MILLIGRAM(S): at 07:49

## 2019-08-23 RX ADMIN — Medication 650 MILLIGRAM(S): at 14:00

## 2019-08-23 RX ADMIN — ENOXAPARIN SODIUM 40 MILLIGRAM(S): 100 INJECTION SUBCUTANEOUS at 12:28

## 2019-08-23 RX ADMIN — PANTOPRAZOLE SODIUM 40 MILLIGRAM(S): 20 TABLET, DELAYED RELEASE ORAL at 07:09

## 2019-08-23 RX ADMIN — Medication 40 MILLIGRAM(S): at 00:21

## 2019-08-23 RX ADMIN — Medication 50 MILLIGRAM(S): at 07:09

## 2019-08-23 RX ADMIN — LISINOPRIL 20 MILLIGRAM(S): 2.5 TABLET ORAL at 07:09

## 2019-08-23 RX ADMIN — Medication 81 MILLIGRAM(S): at 12:28

## 2019-08-23 RX ADMIN — ZOLPIDEM TARTRATE 5 MILLIGRAM(S): 10 TABLET ORAL at 23:04

## 2019-08-23 RX ADMIN — Medication 40 MILLIEQUIVALENT(S): at 07:09

## 2019-08-23 RX ADMIN — Medication 40 MILLIGRAM(S): at 14:21

## 2019-08-23 NOTE — CONSULT NOTE ADULT - SUBJECTIVE AND OBJECTIVE BOX
CHIEF COMPLAINT:  Patient is a 85y old  Female who presents with a chief complaint of     HPI:  86 yo F with CAD, CABG, HTN, GERD pw sob/linn.    ALLERGIES:  NKDA    Talwin (Nausea)  lactose (Headache; Vomiting)  Milk (Vomiting)    Home Medications:  acetaminophen 325 mg oral tablet: 2 tab(s) orally every 6 hours, As needed, Mild Pain (1 - 3) (10 Dec 2018 23:36)  albuterol 90 mcg/inh inhalation aerosol: 1 puff(s) inhaled every 4 hours, As needed, Shortness of Breath and/or Wheezing (10 Dec 2018 23:36)  Ambien 5 mg oral tablet: 1 tab(s) orally once a day (at bedtime), As Needed (10 Dec 2018 23:36)  aspirin 81 mg oral delayed release tablet: 1 tab(s) orally once a day (10 Dec 2018 23:36)  atorvastatin 20 mg oral tablet: 1 tab(s) orally once a day (at bedtime) (10 Dec 2018 23:36)  FLUoxetine 40 mg oral capsule: 1 cap(s) orally once a day (10 Dec 2018 23:36)  furosemide 40 mg oral tablet: 1 tab(s) orally once a day (10 Dec 2018 23:36)  Innerclean oral tablet: 2 tab(s) orally once a day (at bedtime), As needed, Constipation (10 Dec 2018 23:36)  lisinopril 20 mg oral tablet: 1 tab(s) orally once a day (25 Sep 2017 13:48)  metoprolol tartrate 50 mg oral tablet: 1 tab(s) orally 2 times a day (25 Sep 2017 13:48)  pantoprazole 40 mg oral delayed release tablet: 1 tab(s) orally once a day (before a meal) (25 Sep 2017 13:48)  Symbicort 80 mcg-4.5 mcg/inh inhalation aerosol: 2 puff(s) inhaled 2 times a day (23 Aug 2019 09:59)  traMADol 50 mg oral tablet: 1 tab(s) orally every 4 hours, As needed, Mild Pain (1 - 3) (12 Nov 2017 11:38)    PAST MEDICAL & SURGICAL HISTORY:  Coronary bypass graft mechanical complication  ST elevation myocardial infarction (STEMI), unspecified artery  BETSY (acute kidney injury)  Insomnia, unspecified type  Gastroesophageal reflux disease without esophagitis  Acute congestive heart failure, unspecified congestive heart failure type  Hypertension  S/P CABG x 1: 9 days ago  S/P TKR (total knee replacement) using cement, bilateral      FAMILY HISTORY:  No pertinent family history in first degree relatives      SOCIAL HISTORY:    REVIEW OF SYSTEMS:  General:  No wt loss, fevers, chills, night sweats  Eyes:  Good vision, no reported pain  ENT:  No sore throat, pain, runny nose, dysphagia  CV:  No pain, palpitations, hypo/hypertension  Resp:  No dyspnea, cough, tachypnea, wheezing  GI:  No pain, nausea, vomiting, diarrhea, constipation  :  No pain, bleeding, incontinence, nocturia  Muscle:  No pain, weakness  Breast:  No pain, abscess, mass, discharge  Neuro:  No weakness, tingling, memory problems  Psych:  No fatigue, insomnia, mood problems, depression  Endocrine:  No polyuria, polydipsia, cold/heat intolerance  Heme:  No petechiae, ecchymosis, easy bruisability  Skin:  No rash, tattoos, scars, edema    PHYSICAL EXAM:  Vital Signs:  Vital Signs Last 24 Hrs  T(C): 35.7 (23 Aug 2019 10:45), Max: 36.9 (22 Aug 2019 21:04)  T(F): 96.3 (23 Aug 2019 10:45), Max: 98.5 (22 Aug 2019 21:04)  HR: 64 (23 Aug 2019 10:45) (64 - 97)  BP: 134/66 (23 Aug 2019 10:45) (134/66 - 195/93)  RR: 18 (23 Aug 2019 10:45) (18 - 18)  SpO2: 100% (23 Aug 2019 10:45) (97% - 100%)      Tele:   General:  Appears stated age, well-groomed, well-nourished, no distress  HEENT:  NC/AT, patent nares w/ pink mucosa, OP clear w/o lesions, EOMI, conjunctivae clear, no thyromegaly, nodules, adenopathy, no JVD  Chest:  Full & symmetric excursion, no increased effort, breath sounds clear  Cardiovascular:  Regular rhythm, S1, S2, no murmur  Abdomen:  Soft, non-tender, non-distended  Extremities:   Skin:  warm/dry  Musculoskeletal:  Full ROM in all joints w/o swelling/tenderness/effusion  Neuro/Psych:  Alert, oriented      LABORATORY:                          11.0   6.39  )-----------( 269      ( 22 Aug 2019 21:32 )             33.6     08-23    132<L>  |  95<L>  |  12  ----------------------------<  100<H>  3.1<L>   |  28  |  0.70    Ca    8.3<L>      23 Aug 2019 08:09    TPro  7.2  /  Alb  3.6  /  TBili  0.6  /  DBili  x   /  AST  29  /  ALT  27  /  AlkPhos  71  08-22    ABG - ( 23 Aug 2019 01:16 )  pH, Arterial: x     pH, Blood: 7.50  /  pCO2: 36    /  pO2: 80    / HCO3: 27    / Base Excess: 4.5   /  SaO2: 96          CARDIAC MARKERS ( 22 Aug 2019 21:32 )  .039 ng/mL / x     / 89 U/L / x     / 1.1 ng/mL      LIVER FUNCTIONS - ( 22 Aug 2019 21:32 )  Alb: 3.6 g/dL / Pro: 7.2 gm/dL / ALK PHOS: 71 U/L / ALT: 27 U/L / AST: 29 U/L / GGT: x           PT/INR - ( 22 Aug 2019 21:32 )   PT: 11.8 sec;   INR: 1.05 ratio         PTT - ( 22 Aug 2019 21:32 )  PTT:30.7 sec    BNPSerum Pro-Brain Natriuretic Peptide: 73470 pg/mL (08-22-19 @ 21:32)      IMAGING:  < from: Xray Chest 1 View- PORTABLE-Urgent (08.23.19 @ 00:13) >  IMPRESSION:   1. Since 12/24/18 exam, new bilateral pleural effusions.  2. Left basilar atelectasis and/or pneumonia, as above  3. New upper left lung haziness may be due to pneumonia.    < end of copied text >    < from: TTE Echo Doppler w/o Cont (12.11.18 @ 08:58) >   1. Left ventricular ejection fraction, by visual estimation, is 55 to   60%.   2. There is no left ventricular hypertrophy.   3. Mild mitral annular calcification.   4. Thickening of the anterior and posterior mitral valve leaflets.   5. Moderate tricuspid regurgitation.   6. Moderate aortic regurgitation.   7. Sclerotic aortic valve with normal opening.   8. Estimated pulmonary artery systolic pressure is 51.8 mmHg assuming a   right atrial pressure of 5 mmHg, which is consistent with moderate   pulmonary hypertension.   9. The aortic valve mean gradient is 7.1 mmHg consistent with normally   opening aortic valve.    < end of copied text >    ASSESSMENT:  86 yo F with CAD, CABG, HTN, GERD pw sob/linn.    PLAN:     Tele monitor.    MEDICATIONS  (STANDING):  aspirin enteric coated 81 milliGRAM(s) Oral daily  atorvastatin 20 milliGRAM(s) Oral at bedtime  enoxaparin Injectable 40 milliGRAM(s) SubCutaneous daily  FLUoxetine 40 milliGRAM(s) Oral daily  furosemide   Injectable 40 milliGRAM(s) IV Push two times a day  lisinopril 20 milliGRAM(s) Oral daily  metoprolol tartrate 50 milliGRAM(s) Oral two times a day  pantoprazole    Tablet 40 milliGRAM(s) Oral before breakfast    Keep fluid status negative with reduced oral fluid intake and more outputs.  Sodium restriction.    Kevin Moulton MD, FACC, FASE, FASNC, FACP  Director, Heart Failure Services  [facility]  , Department of Cardiology  [facility] CHIEF COMPLAINT:  Patient is a 85y old  Female who presents with a chief complaint of sob    HPI:  84 yo F with CAD, CABG, HTN, GERD pw sob/linn. Mild lower abd pain/soreness as well. Loose stools noted. Better on oxygen/diureses. Had increased water intake over the last few days.    ALLERGIES:  NKDA    Talwin (Nausea)  lactose (Headache; Vomiting)  Milk (Vomiting)    Home Medications:  acetaminophen 325 mg oral tablet: 2 tab(s) orally every 6 hours, As needed, Mild Pain (1 - 3) (10 Dec 2018 23:36)  albuterol 90 mcg/inh inhalation aerosol: 1 puff(s) inhaled every 4 hours, As needed, Shortness of Breath and/or Wheezing (10 Dec 2018 23:36)  Ambien 5 mg oral tablet: 1 tab(s) orally once a day (at bedtime), As Needed (10 Dec 2018 23:36)  aspirin 81 mg oral delayed release tablet: 1 tab(s) orally once a day (10 Dec 2018 23:36)  atorvastatin 20 mg oral tablet: 1 tab(s) orally once a day (at bedtime) (10 Dec 2018 23:36)  FLUoxetine 40 mg oral capsule: 1 cap(s) orally once a day (10 Dec 2018 23:36)  furosemide 40 mg oral tablet: 1 tab(s) orally once a day (10 Dec 2018 23:36)  Innerclean oral tablet: 2 tab(s) orally once a day (at bedtime), As needed, Constipation (10 Dec 2018 23:36)  lisinopril 20 mg oral tablet: 1 tab(s) orally once a day (25 Sep 2017 13:48)  metoprolol tartrate 50 mg oral tablet: 1 tab(s) orally 2 times a day (25 Sep 2017 13:48)  pantoprazole 40 mg oral delayed release tablet: 1 tab(s) orally once a day (before a meal) (25 Sep 2017 13:48)  Symbicort 80 mcg-4.5 mcg/inh inhalation aerosol: 2 puff(s) inhaled 2 times a day (23 Aug 2019 09:59)  traMADol 50 mg oral tablet: 1 tab(s) orally every 4 hours, As needed, Mild Pain (1 - 3) (12 Nov 2017 11:38)    PAST MEDICAL & SURGICAL HISTORY:  Coronary bypass graft mechanical complication  ST elevation myocardial infarction (STEMI), unspecified artery  BETSY (acute kidney injury)  Insomnia, unspecified type  Gastroesophageal reflux disease without esophagitis  Acute congestive heart failure, unspecified congestive heart failure type  Hypertension  S/P CABG x 1: 9 days ago  S/P TKR (total knee replacement) using cement, bilateral      FAMILY HISTORY:  No pertinent family history in first degree relatives      SOCIAL HISTORY:  No tobacco use noted.    REVIEW OF SYSTEMS:  General:  No wt loss, fevers, chills, night sweats  Eyes:  Good vision, no reported pain  ENT:  No sore throat, pain, runny nose, dysphagia  CV:  No pain, palpitations, hypo/hypertension  Resp:  No dyspnea, cough, tachypnea, wheezing  GI:  No pain, nausea, vomiting, diarrhea, constipation  :  No pain, bleeding, incontinence, nocturia  Muscle:  No pain, weakness  Breast:  No pain, abscess, mass, discharge  Neuro:  No weakness, tingling, memory problems  Psych:  No fatigue, insomnia, mood problems, depression  Endocrine:  No polyuria, polydipsia, cold/heat intolerance  Heme:  No petechiae, ecchymosis, easy bruisability  Skin:  No rash, edema    PHYSICAL EXAM:  Vital Signs:  Vital Signs Last 24 Hrs  T(C): 35.7 (23 Aug 2019 10:45), Max: 36.9 (22 Aug 2019 21:04)  T(F): 96.3 (23 Aug 2019 10:45), Max: 98.5 (22 Aug 2019 21:04)  HR: 64 (23 Aug 2019 10:45) (64 - 97)  BP: 134/66 (23 Aug 2019 10:45) (134/66 - 195/93)  RR: 18 (23 Aug 2019 10:45) (18 - 18)  SpO2: 100% (23 Aug 2019 10:45) (97% - 100%)      Tele:   General:  Appears stated age, well-groomed, well-nourished, no distress  HEENT:  NC/AT, patent nares w/ pink mucosa, OP clear w/o lesions, EOMI, conjunctivae clear, no thyromegaly, nodules, adenopathy, no JVD  Chest:  Full & symmetric excursion, no increased effort, breath sounds clear; no wheeze or rales now.  Cardiovascular:  Regular rhythm, S1, S2, no murmur  Abdomen:  Soft, non-tender, non-distended  Extremities: trace b/l leg / ankle edema.  Skin:  warm/dry  Musculoskeletal:  Full ROM in all joints w/o swelling/tenderness/effusion  Neuro/Psych:  Alert, oriented      LABORATORY:                          11.0   6.39  )-----------( 269      ( 22 Aug 2019 21:32 )             33.6     08-23    132<L>  |  95<L>  |  12  ----------------------------<  100<H>  3.1<L>   |  28  |  0.70    Ca    8.3<L>      23 Aug 2019 08:09    TPro  7.2  /  Alb  3.6  /  TBili  0.6  /  DBili  x   /  AST  29  /  ALT  27  /  AlkPhos  71  08-22    ABG - ( 23 Aug 2019 01:16 )  pH, Arterial: x     pH, Blood: 7.50  /  pCO2: 36    /  pO2: 80    / HCO3: 27    / Base Excess: 4.5   /  SaO2: 96          CARDIAC MARKERS ( 22 Aug 2019 21:32 )  .039 ng/mL / x     / 89 U/L / x     / 1.1 ng/mL      LIVER FUNCTIONS - ( 22 Aug 2019 21:32 )  Alb: 3.6 g/dL / Pro: 7.2 gm/dL / ALK PHOS: 71 U/L / ALT: 27 U/L / AST: 29 U/L / GGT: x           PT/INR - ( 22 Aug 2019 21:32 )   PT: 11.8 sec;   INR: 1.05 ratio         PTT - ( 22 Aug 2019 21:32 )  PTT:30.7 sec    BNPSerum Pro-Brain Natriuretic Peptide: 95637 pg/mL (08-22-19 @ 21:32)      IMAGING:  < from: Xray Chest 1 View- PORTABLE-Urgent (08.23.19 @ 00:13) >  IMPRESSION:   1. Since 12/24/18 exam, new bilateral pleural effusions.  2. Left basilar atelectasis and/or pneumonia, as above  3. New upper left lung haziness may be due to pneumonia.    < end of copied text >    < from: TTE Echo Doppler w/o Cont (12.11.18 @ 08:58) >   1. Left ventricular ejection fraction, by visual estimation, is 55 to   60%.   2. There is no left ventricular hypertrophy.   3. Mild mitral annular calcification.   4. Thickening of the anterior and posterior mitral valve leaflets.   5. Moderate tricuspid regurgitation.   6. Moderate aortic regurgitation.   7. Sclerotic aortic valve with normal opening.   8. Estimated pulmonary artery systolic pressure is 51.8 mmHg assuming a   right atrial pressure of 5 mmHg, which is consistent with moderate   pulmonary hypertension.   9. The aortic valve mean gradient is 7.1 mmHg consistent with normally   opening aortic valve.    < end of copied text >    ASSESSMENT:  84 yo F with CAD, CABG, HTN, GERD pw sob/linn. Mild lower abd pain/soreness as well. Loose stools noted. Better on oxygen/diureses. Had increased water intake over the last few days.    PLAN:     Tele monitor.    MEDICATIONS  (STANDING):  aspirin enteric coated 81 milliGRAM(s) Oral daily  atorvastatin 20 milliGRAM(s) Oral at bedtime  enoxaparin Injectable 40 milliGRAM(s) SubCutaneous daily  FLUoxetine 40 milliGRAM(s) Oral daily  furosemide   Injectable 40 milliGRAM(s) IV Push two times a day  lisinopril 20 milliGRAM(s) Oral daily  metoprolol tartrate 50 milliGRAM(s) Oral two times a day  pantoprazole    Tablet 40 milliGRAM(s) Oral before breakfast    Keep fluid status negative with reduced oral fluid intake and more outputs.  Sodium restriction.  check echo.    Kevin Moulton MD, FACC, ROEL, YOLIS, FACP  Director, Heart Failure Services  [facility]  , Department of Cardiology  [employer]

## 2019-08-24 DIAGNOSIS — T82.218A OTHER MECHANICAL COMPLICATION OF CORONARY ARTERY BYPASS GRAFT, INITIAL ENCOUNTER: ICD-10-CM

## 2019-08-24 DIAGNOSIS — I50.43 ACUTE ON CHRONIC COMBINED SYSTOLIC (CONGESTIVE) AND DIASTOLIC (CONGESTIVE) HEART FAILURE: ICD-10-CM

## 2019-08-24 DIAGNOSIS — I10 ESSENTIAL (PRIMARY) HYPERTENSION: ICD-10-CM

## 2019-08-24 PROCEDURE — 99232 SBSQ HOSP IP/OBS MODERATE 35: CPT

## 2019-08-24 RX ADMIN — ENOXAPARIN SODIUM 40 MILLIGRAM(S): 100 INJECTION SUBCUTANEOUS at 11:46

## 2019-08-24 RX ADMIN — PANTOPRAZOLE SODIUM 40 MILLIGRAM(S): 20 TABLET, DELAYED RELEASE ORAL at 04:50

## 2019-08-24 RX ADMIN — Medication 40 MILLIGRAM(S): at 11:46

## 2019-08-24 RX ADMIN — Medication 650 MILLIGRAM(S): at 12:36

## 2019-08-24 RX ADMIN — Medication 650 MILLIGRAM(S): at 04:51

## 2019-08-24 RX ADMIN — Medication 50 MILLIGRAM(S): at 04:51

## 2019-08-24 RX ADMIN — Medication 650 MILLIGRAM(S): at 13:36

## 2019-08-24 RX ADMIN — Medication 40 MILLIGRAM(S): at 17:24

## 2019-08-24 RX ADMIN — Medication 650 MILLIGRAM(S): at 05:50

## 2019-08-24 RX ADMIN — Medication 40 MILLIGRAM(S): at 04:51

## 2019-08-24 RX ADMIN — Medication 81 MILLIGRAM(S): at 11:46

## 2019-08-24 RX ADMIN — ZOLPIDEM TARTRATE 5 MILLIGRAM(S): 10 TABLET ORAL at 20:57

## 2019-08-24 RX ADMIN — LISINOPRIL 20 MILLIGRAM(S): 2.5 TABLET ORAL at 04:50

## 2019-08-24 RX ADMIN — Medication 50 MILLIGRAM(S): at 17:24

## 2019-08-24 RX ADMIN — ATORVASTATIN CALCIUM 20 MILLIGRAM(S): 80 TABLET, FILM COATED ORAL at 20:58

## 2019-08-24 NOTE — PROGRESS NOTE ADULT - ASSESSMENT
84 yo F with CAD, CABG, HTN, GERD pw sob/linn. Mild lower abd pain/soreness as well. Loose stools noted. Better on oxygen/diureses. Had increased water intake over the last few days.  Improved overnight.  Tele: sinus 60s      PLAN:     -Tele monitor... no overnight events... HRs 60s  -cont asa/statin/lisinopril/metoprolol  -cont diuresis with IV lasix... 40IV bid  -monitor lytes/creat  -repeat echo with LVEF 55%, DD, mild-mod MR, mild MS, mod AI, mild pulm HTN... predominantly unchanged from 2018

## 2019-08-24 NOTE — PROGRESS NOTE ADULT - SUBJECTIVE AND OBJECTIVE BOX
CHIEF COMPLAINT:  Patient is a 85y old  Female who presents with a chief complaint of sob    HPI:  86 yo F with CAD, CABG, HTN, GERD pw sob/linn. Mild lower abd pain/soreness as well. Loose stools noted. Better on oxygen/diureses. Had increased water intake over the last few days.  Improved overnight.  Tele: sinus 60s    ALLERGIES:  NKDA    Talwin (Nausea)  lactose (Headache; Vomiting)  Milk (Vomiting)      PAST MEDICAL & SURGICAL HISTORY:  Coronary bypass graft mechanical complication  ST elevation myocardial infarction (STEMI), unspecified artery  BETSY (acute kidney injury)  Insomnia, unspecified type  Gastroesophageal reflux disease without esophagitis  Acute congestive heart failure, unspecified congestive heart failure type  Hypertension  S/P CABG x 1: 9 days ago  S/P TKR (total knee replacement) using cement, bilateral    MEDICATIONS  (STANDING):  aspirin enteric coated 81 milliGRAM(s) Oral daily  atorvastatin 20 milliGRAM(s) Oral at bedtime  enoxaparin Injectable 40 milliGRAM(s) SubCutaneous daily  FLUoxetine 40 milliGRAM(s) Oral daily  furosemide   Injectable 40 milliGRAM(s) IV Push two times a day  lisinopril 20 milliGRAM(s) Oral daily  metoprolol tartrate 50 milliGRAM(s) Oral two times a day  pantoprazole    Tablet 40 milliGRAM(s) Oral before breakfast    MEDICATIONS  (PRN):  acetaminophen   Tablet .. 650 milliGRAM(s) Oral every 6 hours PRN Mild Pain (1 - 3)  zolpidem 5 milliGRAM(s) Oral at bedtime PRN Insomnia      FAMILY HISTORY:  No pertinent family history in first degree relatives      SOCIAL HISTORY:  No tobacco use noted.    REVIEW OF SYSTEMS:  General:  No wt loss, fevers, chills, night sweats  Eyes:  Good vision, no reported pain  ENT:  No sore throat, pain, runny nose, dysphagia  CV:  No pain, palpitations, hypo/hypertension  Resp:  No dyspnea, cough, tachypnea, wheezing  GI:  No pain, nausea, vomiting, diarrhea, constipation  :  No pain, bleeding, incontinence, nocturia  Muscle:  No pain, weakness  Breast:  No pain, abscess, mass, discharge  Neuro:  No weakness, tingling, memory problems  Psych:  No fatigue, insomnia, mood problems, depression  Endocrine:  No polyuria, polydipsia, cold/heat intolerance  Heme:  No petechiae, ecchymosis, easy bruisability  Skin:  No rash, edema    PHYSICAL EXAM:  Vital Signs Last 24 Hrs  T(C): 36.1 (24 Aug 2019 05:13), Max: 36.1 (24 Aug 2019 05:13)  T(F): 97 (24 Aug 2019 05:13), Max: 97 (24 Aug 2019 05:13)  HR: 69 (24 Aug 2019 05:13) (67 - 72)  BP: 135/66 (24 Aug 2019 05:13) (135/66 - 155/81)  RR: 17 (24 Aug 2019 05:13) (17 - 18)  SpO2: 99% (24 Aug 2019 05:13) (99% - 100%)      Tele:   General:  Appears stated age, well-groomed, well-nourished, no distress  HEENT:  NC/AT, patent nares w/ pink mucosa, OP clear w/o lesions, EOMI, conjunctivae clear, no thyromegaly, nodules, adenopathy, no JVD  Chest:  Full & symmetric excursion, no increased effort, breath sounds clear; no wheeze or rales now.  Cardiovascular:  Regular rhythm, S1, S2, no murmur  Abdomen:  Soft, non-tender, non-distended  Extremities: trace b/l leg / ankle edema.  Skin:  warm/dry  Musculoskeletal:  Full ROM in all joints w/o swelling/tenderness/effusion  Neuro/Psych:  Alert, oriented        LABORATORY:                          11.0   6.39  )-----------( 269      ( 22 Aug 2019 21:32 )             33.6     08-23    132<L>  |  95<L>  |  12  ----------------------------<  100<H>  3.1<L>   |  28  |  0.70    Ca    8.3<L>      23 Aug 2019 08:09    TPro  7.2  /  Alb  3.6  /  TBili  0.6  /  DBili  x   /  AST  29  /  ALT  27  /  AlkPhos  71  08-22      BNPSerum Pro-Brain Natriuretic Peptide: 91010 pg/mL (08-22-19 @ 21:32)      IMAGING:  < from: Xray Chest 1 View- PORTABLE-Urgent (08.23.19 @ 00:13) >  IMPRESSION:   1. Since 12/24/18 exam, new bilateral pleural effusions.  2. Left basilar atelectasis and/or pneumonia, as above  3. New upper left lung haziness may be due to pneumonia.    < end of copied text >    < from: TTE Echo Doppler w/o Cont (12.11.18 @ 08:58) >   1. Left ventricular ejection fraction, by visual estimation, is 55 to   60%.   2. There is no left ventricular hypertrophy.   3. Mild mitral annular calcification.   4. Thickening of the anterior and posterior mitral valve leaflets.   5. Moderate tricuspid regurgitation.   6. Moderate aortic regurgitation.   7. Sclerotic aortic valve with normal opening.   8. Estimated pulmonary artery systolic pressure is 51.8 mmHg assuming a   right atrial pressure of 5 mmHg, which is consistent with moderate   pulmonary hypertension.   9. The aortic valve mean gradient is 7.1 mmHg consistent with normally   opening aortic valve.    < end of copied text >    < from: TTE Echo Doppler w/o Cont (08.23.19 @ 16:30) >   1. Left ventricular ejection fraction, by visual estimation, is 55 to   60%.   2. Normal left ventricular internal cavity size.   3. Spectral Doppler shows restrictive pattern of left ventricular  myocardial filling (Grade III diastolic dysfunction).   4. Normal right ventricular size and function.   5. The left atrium is normal in size.   6. The right atrium is normal in size.   7. There is no evidence of pericardial effusion.   8. Mild to moderate mitral annular calcification.   9. Mild to moderate mitral valve regurgitation.  10. Thickening of the anterior and posterior mitral valve leaflets.  11. Structurally normal tricuspid valve, with normal leaflet excursion.  12. Moderate aorticregurgitation.  13. Structurally normal pulmonic valve, with normal leaflet excursion.  14. Estimated pulmonary artery systolic pressure is 47.9 mmHg assuming a   right atrial pressure of 10 mmHg, which is consistent with mild pulmonary   hypertension.  15. Mitral valve mean gradient is 4.9 mmHg consistent with mild mitral   stenosis.    < end of copied text >

## 2019-08-24 NOTE — PROGRESS NOTE ADULT - SUBJECTIVE AND OBJECTIVE BOX
84 yo lady admitted for acute/ chronic CHF      Vital Signs Last 24 Hrs  T(C): 36.1 (24 Aug 2019 17:48), Max: 36.2 (24 Aug 2019 11:10)  T(F): 97 (24 Aug 2019 17:48), Max: 97.1 (24 Aug 2019 11:10)  HR: 64 (24 Aug 2019 17:48) (64 - 69)  BP: 161/67 (24 Aug 2019 17:48) (135/66 - 161/67)  BP(mean): --  RR: 17 (24 Aug 2019 17:48) (17 - 18)  SpO2: 100% (24 Aug 2019 17:48) (99% - 100%)    08-23    132<L>  |  95<L>  |  12  ----------------------------<  100<H>  3.1<L>   |  28  |  0.70    Ca    8.3<L>      23 Aug 2019 08:09    MEDICATIONS  (STANDING):  aspirin enteric coated 81 milliGRAM(s) Oral daily  atorvastatin 20 milliGRAM(s) Oral at bedtime  enoxaparin Injectable 40 milliGRAM(s) SubCutaneous daily  FLUoxetine 40 milliGRAM(s) Oral daily  furosemide   Injectable 40 milliGRAM(s) IV Push two times a day  lisinopril 20 milliGRAM(s) Oral daily  metoprolol tartrate 50 milliGRAM(s) Oral two times a day  pantoprazole    Tablet 40 milliGRAM(s) Oral before breakfast    MEDICATIONS  (PRN):  acetaminophen   Tablet .. 650 milliGRAM(s) Oral every 6 hours PRN Mild Pain (1 - 3)  zolpidem 5 milliGRAM(s) Oral at bedtime PRN Insomnia

## 2019-08-25 DIAGNOSIS — I50.9 HEART FAILURE, UNSPECIFIED: ICD-10-CM

## 2019-08-25 LAB
ANION GAP SERPL CALC-SCNC: 10 MMOL/L — SIGNIFICANT CHANGE UP (ref 5–17)
BUN SERPL-MCNC: 38 MG/DL — HIGH (ref 7–23)
CALCIUM SERPL-MCNC: 8.8 MG/DL — SIGNIFICANT CHANGE UP (ref 8.5–10.1)
CHLORIDE SERPL-SCNC: 93 MMOL/L — LOW (ref 96–108)
CO2 SERPL-SCNC: 29 MMOL/L — SIGNIFICANT CHANGE UP (ref 22–31)
CREAT SERPL-MCNC: 1.04 MG/DL — SIGNIFICANT CHANGE UP (ref 0.5–1.3)
GLUCOSE BLDC GLUCOMTR-MCNC: 194 MG/DL — HIGH (ref 70–99)
GLUCOSE SERPL-MCNC: 131 MG/DL — HIGH (ref 70–99)
HCT VFR BLD CALC: 35.3 % — SIGNIFICANT CHANGE UP (ref 34.5–45)
HGB BLD-MCNC: 11.2 G/DL — LOW (ref 11.5–15.5)
MAGNESIUM SERPL-MCNC: 2.1 MG/DL — SIGNIFICANT CHANGE UP (ref 1.6–2.6)
MCHC RBC-ENTMCNC: 26 PG — LOW (ref 27–34)
MCHC RBC-ENTMCNC: 31.7 GM/DL — LOW (ref 32–36)
MCV RBC AUTO: 81.9 FL — SIGNIFICANT CHANGE UP (ref 80–100)
NRBC # BLD: 0 /100 WBCS — SIGNIFICANT CHANGE UP (ref 0–0)
PHOSPHATE SERPL-MCNC: 4.5 MG/DL — SIGNIFICANT CHANGE UP (ref 2.5–4.5)
PLATELET # BLD AUTO: 296 K/UL — SIGNIFICANT CHANGE UP (ref 150–400)
POTASSIUM SERPL-MCNC: 4.5 MMOL/L — SIGNIFICANT CHANGE UP (ref 3.5–5.3)
POTASSIUM SERPL-SCNC: 4.5 MMOL/L — SIGNIFICANT CHANGE UP (ref 3.5–5.3)
RBC # BLD: 4.31 M/UL — SIGNIFICANT CHANGE UP (ref 3.8–5.2)
RBC # FLD: 16.8 % — HIGH (ref 10.3–14.5)
SODIUM SERPL-SCNC: 132 MMOL/L — LOW (ref 135–145)
TROPONIN I SERPL-MCNC: 0.01 NG/ML — SIGNIFICANT CHANGE UP (ref 0.01–0.04)
WBC # BLD: 9.24 K/UL — SIGNIFICANT CHANGE UP (ref 3.8–10.5)
WBC # FLD AUTO: 9.24 K/UL — SIGNIFICANT CHANGE UP (ref 3.8–10.5)

## 2019-08-25 PROCEDURE — 71045 X-RAY EXAM CHEST 1 VIEW: CPT | Mod: 26

## 2019-08-25 PROCEDURE — 99232 SBSQ HOSP IP/OBS MODERATE 35: CPT

## 2019-08-25 PROCEDURE — 93010 ELECTROCARDIOGRAM REPORT: CPT

## 2019-08-25 RX ORDER — FUROSEMIDE 40 MG
40 TABLET ORAL DAILY
Refills: 0 | Status: DISCONTINUED | OUTPATIENT
Start: 2019-08-26 | End: 2019-08-29

## 2019-08-25 RX ORDER — POTASSIUM CHLORIDE 20 MEQ
40 PACKET (EA) ORAL ONCE
Refills: 0 | Status: DISCONTINUED | OUTPATIENT
Start: 2019-08-25 | End: 2019-08-25

## 2019-08-25 RX ORDER — IPRATROPIUM/ALBUTEROL SULFATE 18-103MCG
3 AEROSOL WITH ADAPTER (GRAM) INHALATION ONCE
Refills: 0 | Status: COMPLETED | OUTPATIENT
Start: 2019-08-25 | End: 2019-08-25

## 2019-08-25 RX ORDER — ALBUTEROL 90 UG/1
2.5 AEROSOL, METERED ORAL ONCE
Refills: 0 | Status: DISCONTINUED | OUTPATIENT
Start: 2019-08-25 | End: 2019-09-03

## 2019-08-25 RX ORDER — ONDANSETRON 8 MG/1
4 TABLET, FILM COATED ORAL ONCE
Refills: 0 | Status: COMPLETED | OUTPATIENT
Start: 2019-08-25 | End: 2019-08-25

## 2019-08-25 RX ADMIN — ATORVASTATIN CALCIUM 20 MILLIGRAM(S): 80 TABLET, FILM COATED ORAL at 21:17

## 2019-08-25 RX ADMIN — Medication 650 MILLIGRAM(S): at 05:18

## 2019-08-25 RX ADMIN — Medication 650 MILLIGRAM(S): at 21:16

## 2019-08-25 RX ADMIN — LISINOPRIL 20 MILLIGRAM(S): 2.5 TABLET ORAL at 05:17

## 2019-08-25 RX ADMIN — Medication 650 MILLIGRAM(S): at 22:15

## 2019-08-25 RX ADMIN — ENOXAPARIN SODIUM 40 MILLIGRAM(S): 100 INJECTION SUBCUTANEOUS at 12:00

## 2019-08-25 RX ADMIN — ZOLPIDEM TARTRATE 5 MILLIGRAM(S): 10 TABLET ORAL at 21:18

## 2019-08-25 RX ADMIN — ONDANSETRON 4 MILLIGRAM(S): 8 TABLET, FILM COATED ORAL at 11:56

## 2019-08-25 RX ADMIN — Medication 3 MILLILITER(S): at 11:33

## 2019-08-25 RX ADMIN — Medication 81 MILLIGRAM(S): at 11:58

## 2019-08-25 RX ADMIN — Medication 50 MILLIGRAM(S): at 05:17

## 2019-08-25 RX ADMIN — PANTOPRAZOLE SODIUM 40 MILLIGRAM(S): 20 TABLET, DELAYED RELEASE ORAL at 05:17

## 2019-08-25 RX ADMIN — Medication 40 MILLIGRAM(S): at 11:58

## 2019-08-25 RX ADMIN — Medication 40 MILLIGRAM(S): at 05:17

## 2019-08-25 RX ADMIN — Medication 650 MILLIGRAM(S): at 05:48

## 2019-08-25 NOTE — DIETITIAN INITIAL EVALUATION ADULT. - PERTINENT LABORATORY DATA
08-23 Na 132 mmol/L<L> Glu 100 mg/dL<H> K+ 3.1 mmol/L<L> Cr 0.70 mg/dL BUN 12 mg/dL Phos n/a   Alb 3.6(8/22)   PAB n/a   Hgb n/a   Hct n/a   HgA1C n/a     24Hr FS:

## 2019-08-25 NOTE — PROGRESS NOTE ADULT - ASSESSMENT
86 yo F with CAD, CABG, HTN, GERD pw sob/linn. Mild lower abd pain/soreness as well. Loose stools noted. Better on oxygen/diureses. Had increased water intake over the last few days.  Improved overnight.  Tele: sinus 60s      PLAN:     -Tele monitor... no overnight events... HRs 60s  -cont asa/statin/lisinopril/metoprolol  -cont diuresis with IV lasix... 40IV bid  -monitor lytes/creat  -repeat echo with LVEF 55%, DD, mild-mod MR, mild MS, mod AI, mild pulm HTN... predominantly unchanged from 2018 86 yo F with CAD, CABG, HTN, GERD pw sob/linn. Mild lower abd pain/soreness as well. Loose stools noted. Better on oxygen/diureses. Had increased water intake over the last few days.  Improved overnight.  Tele: sinus 60s      PLAN:     -Tele monitor... no overnight events... HRs 60s  -cont asa/statin/lisinopril/metoprolol  -cont diuresis with IV lasix... 40IV bid.... likely switch to PO in AM OR if creat bumps on BMP  -monitor lytes/creat and K with diuresis.... no labs today; added them STAT  -repeat echo with LVEF 55%, DD, mild-mod MR, mild MS, mod AI, mild pulm HTN... predominantly unchanged from 2018

## 2019-08-25 NOTE — PROGRESS NOTE ADULT - SUBJECTIVE AND OBJECTIVE BOX
86 yo                  lady         with DILLARD/SOB    Vital Signs Last 24 Hrs  T(C): 35.8 (25 Aug 2019 11:01), Max: 36.3 (24 Aug 2019 23:25)  T(F): 96.5 (25 Aug 2019 11:01), Max: 97.4 (24 Aug 2019 23:25)  HR: 68 (25 Aug 2019 11:01) (62 - 68)  BP: 152/75 (25 Aug 2019 11:01) (152/72 - 161/67)  BP(mean): --  RR: 16 (25 Aug 2019 11:01) (16 - 17)  SpO2: 100% (25 Aug 2019 11:01) (98% - 100%)                          11.2   9.24  )-----------( 296      ( 25 Aug 2019 11:47 )             35.3       08-25    132<L>  |  93<L>  |  38<H>  ----------------------------<  131<H>  4.5   |  29  |  1.04    Ca    8.8      25 Aug 2019 11:47  Phos  4.5     08-25  Mg     2.1     08-25    MEDICATIONS  (STANDING):  aspirin enteric coated 81 milliGRAM(s) Oral daily  atorvastatin 20 milliGRAM(s) Oral at bedtime  enoxaparin Injectable 40 milliGRAM(s) SubCutaneous daily  FLUoxetine 40 milliGRAM(s) Oral daily  furosemide   Injectable 40 milliGRAM(s) IV Push two times a day  lisinopril 20 milliGRAM(s) Oral daily  metoprolol tartrate 50 milliGRAM(s) Oral two times a day  pantoprazole    Tablet 40 milliGRAM(s) Oral before breakfast    MEDICATIONS  (PRN):  acetaminophen   Tablet .. 650 milliGRAM(s) Oral every 6 hours PRN Mild Pain (1 - 3)  zolpidem 5 milliGRAM(s) Oral at bedtime PRN Insomnia

## 2019-08-25 NOTE — DIETITIAN INITIAL EVALUATION ADULT. - OTHER INFO
Pt lives alone & receives Meals on Wheels x 8 meals/week. Pt follows Low Na diet PTA;   Diet hx; Eggs or cereal  Lunch; Cream cheese & jelly s/w  Dinner; meal from [facility].     Pt reports decreased appetite & po intake x ~4 days due to increased SOB. Observed pt consumed 25% breakfast this am. Pt lives alone & receives Meals on Wheels x 8 meals/week. Pt follows Low Na diet PTA;   Diet hx; Eggs or cereal  Lunch; Cream cheese & jelly s/w  Dinner; meal from [facility].     Pt reports decreased appetite & po intake x ~4 days due to increased SOB & presents with Satish pleural effusions. Observed pt consumed 25% breakfast this am. UBW obtained from pt.    Pt refused diet handout material @ this time. Pt lives alone & receives Meals on Wheels x 8 meals/week. Pt follows Low Na diet PTA;   Diet hx; Eggs or cereal  Lunch; Cream cheese & jelly s/w  Dinner; meal from [facility].     Pt reports decreased appetite & po intake x ~4 days due to increased SOB & presents with Satish pleural effusions. Observed pt consumed 25% breakfast this am. Pt with natural dentition reports no difficulty chewing or swallowing. UBW obtained from pt. Last BM x 2(8/23).     Pt refused diet handout material @ this time.

## 2019-08-25 NOTE — DIETITIAN INITIAL EVALUATION ADULT. - PERTINENT MEDS FT
acetaminophen   Tablet .. PRN  aspirin enteric coated  atorvastatin  enoxaparin Injectable  FLUoxetine  furosemide   Injectable  lisinopril  metoprolol tartrate  ondansetron Injectable  pantoprazole    Tablet  potassium chloride    Tablet ER  zolpidem PRN

## 2019-08-25 NOTE — CHART NOTE - NSCHARTNOTEFT_GEN_A_CORE
Upon Nutritional Assessment by the Registered Dietitian your patient was determined to meet criteria / has evidence of the following diagnosis/diagnoses:          [ ]  Mild Protein Calorie Malnutrition        [x]  Moderate Protein Calorie Malnutrition        [ ] Severe Protein Calorie Malnutrition        [ ] Unspecified Protein Calorie Malnutrition        [ ] Underweight / BMI <19        [ ] Morbid Obesity / BMI > 40      Findings as based on:  •  Comprehensive nutrition assessment and consultation  •  Calorie counts (nutrient intake analysis)  •  Food acceptance and intake status from observations by staff  •  Follow up  •  Patient education  •  Intervention secondary to interdisciplinary rounds  •   concerns      Treatment:    The following diet has been recommended:  Low Na/Ensure Enlive 2 x day(700kcal & 40gm protein)    PROVIDER Section:     By signing this assessment you are acknowledging and agree with the diagnosis/diagnoses assigned by the Registered Dietitian    Comments:

## 2019-08-25 NOTE — PROGRESS NOTE ADULT - SUBJECTIVE AND OBJECTIVE BOX
CHIEF COMPLAINT:  Patient is a 85y old  Female who presents with a chief complaint of sob    HPI:  86 yo F with CAD, CABG, HTN, GERD pw sob/linn. Mild lower abd pain/soreness as well. Loose stools noted. Better on oxygen/diureses. Had increased water intake over the last few days.  Improved overnight.  Tele: sinus 60s    ALLERGIES:  NKDA    Talwin (Nausea)  lactose (Headache; Vomiting)  Milk (Vomiting)      PAST MEDICAL & SURGICAL HISTORY:  Coronary bypass graft mechanical complication  ST elevation myocardial infarction (STEMI), unspecified artery  BETSY (acute kidney injury)  Insomnia, unspecified type  Gastroesophageal reflux disease without esophagitis  Acute congestive heart failure, unspecified congestive heart failure type  Hypertension  S/P CABG x 1: 9 days ago  S/P TKR (total knee replacement) using cement, bilateral    MEDICATIONS  (STANDING):  aspirin enteric coated 81 milliGRAM(s) Oral daily  atorvastatin 20 milliGRAM(s) Oral at bedtime  enoxaparin Injectable 40 milliGRAM(s) SubCutaneous daily  FLUoxetine 40 milliGRAM(s) Oral daily  furosemide   Injectable 40 milliGRAM(s) IV Push two times a day  lisinopril 20 milliGRAM(s) Oral daily  metoprolol tartrate 50 milliGRAM(s) Oral two times a day  pantoprazole    Tablet 40 milliGRAM(s) Oral before breakfast    MEDICATIONS  (PRN):  acetaminophen   Tablet .. 650 milliGRAM(s) Oral every 6 hours PRN Mild Pain (1 - 3)  zolpidem 5 milliGRAM(s) Oral at bedtime PRN Insomnia      FAMILY HISTORY:  No pertinent family history in first degree relatives      SOCIAL HISTORY:  No tobacco use noted.    REVIEW OF SYSTEMS:  General:  No wt loss, fevers, chills, night sweats  Eyes:  Good vision, no reported pain  ENT:  No sore throat, pain, runny nose, dysphagia  CV:  No pain, palpitations, hypo/hypertension  Resp:  No dyspnea, cough, tachypnea, wheezing  GI:  No pain, nausea, vomiting, diarrhea, constipation  :  No pain, bleeding, incontinence, nocturia  Muscle:  No pain, weakness  Breast:  No pain, abscess, mass, discharge  Neuro:  No weakness, tingling, memory problems  Psych:  No fatigue, insomnia, mood problems, depression  Endocrine:  No polyuria, polydipsia, cold/heat intolerance  Heme:  No petechiae, ecchymosis, easy bruisability  Skin:  No rash, edema    PHYSICAL EXAM:  Vital Signs Last 24 Hrs  T(C): 36.2 (25 Aug 2019 04:45), Max: 36.3 (24 Aug 2019 23:25)  T(F): 97.2 (25 Aug 2019 04:45), Max: 97.4 (24 Aug 2019 23:25)  HR: 67 (25 Aug 2019 04:45) (62 - 67)  BP: 157/74 (25 Aug 2019 04:45) (143/78 - 161/67)  RR: 17 (25 Aug 2019 04:45) (17 - 18)  SpO2: 98% (25 Aug 2019 04:45) (98% - 100%)      Tele:   General:  Appears stated age, well-groomed, well-nourished, no distress  HEENT:  NC/AT, patent nares w/ pink mucosa, OP clear w/o lesions, EOMI, conjunctivae clear, no thyromegaly, nodules, adenopathy, no JVD  Chest:  Full & symmetric excursion, no increased effort, breath sounds clear; no wheeze or rales now.  Cardiovascular:  Regular rhythm, S1, S2, no murmur  Abdomen:  Soft, non-tender, non-distended  Extremities: trace b/l leg / ankle edema.  Skin:  warm/dry  Musculoskeletal:  Full ROM in all joints w/o swelling/tenderness/effusion  Neuro/Psych:  Alert, oriented        LABORATORY:    no labs      BNPSerum Pro-Brain Natriuretic Peptide: 98768 pg/mL (08-22-19 @ 21:32)      IMAGING:  < from: Xray Chest 1 View- PORTABLE-Urgent (08.23.19 @ 00:13) >  IMPRESSION:   1. Since 12/24/18 exam, new bilateral pleural effusions.  2. Left basilar atelectasis and/or pneumonia, as above  3. New upper left lung haziness may be due to pneumonia.    < end of copied text >    < from: TTE Echo Doppler w/o Cont (12.11.18 @ 08:58) >   1. Left ventricular ejection fraction, by visual estimation, is 55 to   60%.   2. There is no left ventricular hypertrophy.   3. Mild mitral annular calcification.   4. Thickening of the anterior and posterior mitral valve leaflets.   5. Moderate tricuspid regurgitation.   6. Moderate aortic regurgitation.   7. Sclerotic aortic valve with normal opening.   8. Estimated pulmonary artery systolic pressure is 51.8 mmHg assuming a   right atrial pressure of 5 mmHg, which is consistent with moderate   pulmonary hypertension.   9. The aortic valve mean gradient is 7.1 mmHg consistent with normally   opening aortic valve.    < end of copied text >    < from: TTE Echo Doppler w/o Cont (08.23.19 @ 16:30) >   1. Left ventricular ejection fraction, by visual estimation, is 55 to   60%.   2. Normal left ventricular internal cavity size.   3. Spectral Doppler shows restrictive pattern of left ventricular  myocardial filling (Grade III diastolic dysfunction).   4. Normal right ventricular size and function.   5. The left atrium is normal in size.   6. The right atrium is normal in size.   7. There is no evidence of pericardial effusion.   8. Mild to moderate mitral annular calcification.   9. Mild to moderate mitral valve regurgitation.  10. Thickening of the anterior and posterior mitral valve leaflets.  11. Structurally normal tricuspid valve, with normal leaflet excursion.  12. Moderate aorticregurgitation.  13. Structurally normal pulmonic valve, with normal leaflet excursion.  14. Estimated pulmonary artery systolic pressure is 47.9 mmHg assuming a   right atrial pressure of 10 mmHg, which is consistent with mild pulmonary   hypertension.  15. Mitral valve mean gradient is 4.9 mmHg consistent with mild mitral   stenosis.    < end of copied text >

## 2019-08-25 NOTE — DIETITIAN INITIAL EVALUATION ADULT. - PHYSICAL APPEARANCE
other (specify)/debilitated/BMI=25; no edema Nutrition focused physical exam conducted: Subcutaneous fat loss: [Mild ] Orbital fat pads region, [ WNL]Buccal fat region, [Moderate ]Triceps region,  [WNL ]Ribs region.  Muscle wasting: [Mild]Temples region, [moderate ]Clavicle region, [Moderate ]Shoulder region, [Unable ]Scapula region, [Mild ]Interosseous region,  [WNL ]thigh region, [WNL ]Calf region other (specify)/debilitated/BMI=25.3; no edema

## 2019-08-26 LAB — GLUCOSE BLDC GLUCOMTR-MCNC: 119 MG/DL — HIGH (ref 70–99)

## 2019-08-26 PROCEDURE — 99232 SBSQ HOSP IP/OBS MODERATE 35: CPT

## 2019-08-26 RX ORDER — IPRATROPIUM/ALBUTEROL SULFATE 18-103MCG
3 AEROSOL WITH ADAPTER (GRAM) INHALATION EVERY 6 HOURS
Refills: 0 | Status: DISCONTINUED | OUTPATIENT
Start: 2019-08-26 | End: 2019-08-30

## 2019-08-26 RX ORDER — DOCUSATE SODIUM 100 MG
100 CAPSULE ORAL ONCE
Refills: 0 | Status: COMPLETED | OUTPATIENT
Start: 2019-08-26 | End: 2019-08-26

## 2019-08-26 RX ORDER — DOCUSATE SODIUM 100 MG
100 CAPSULE ORAL THREE TIMES A DAY
Refills: 0 | Status: DISCONTINUED | OUTPATIENT
Start: 2019-08-26 | End: 2019-09-03

## 2019-08-26 RX ADMIN — Medication 40 MILLIGRAM(S): at 11:52

## 2019-08-26 RX ADMIN — Medication 3 MILLILITER(S): at 23:59

## 2019-08-26 RX ADMIN — Medication 3 MILLILITER(S): at 17:05

## 2019-08-26 RX ADMIN — ZOLPIDEM TARTRATE 5 MILLIGRAM(S): 10 TABLET ORAL at 21:40

## 2019-08-26 RX ADMIN — Medication 50 MILLIGRAM(S): at 06:20

## 2019-08-26 RX ADMIN — Medication 650 MILLIGRAM(S): at 22:10

## 2019-08-26 RX ADMIN — Medication 40 MILLIGRAM(S): at 06:20

## 2019-08-26 RX ADMIN — Medication 100 MILLIGRAM(S): at 21:27

## 2019-08-26 RX ADMIN — Medication 50 MILLIGRAM(S): at 18:15

## 2019-08-26 RX ADMIN — Medication 30 MILLILITER(S): at 15:11

## 2019-08-26 RX ADMIN — LISINOPRIL 20 MILLIGRAM(S): 2.5 TABLET ORAL at 06:20

## 2019-08-26 RX ADMIN — ATORVASTATIN CALCIUM 20 MILLIGRAM(S): 80 TABLET, FILM COATED ORAL at 21:26

## 2019-08-26 RX ADMIN — Medication 650 MILLIGRAM(S): at 21:40

## 2019-08-26 RX ADMIN — Medication 100 MILLIGRAM(S): at 18:15

## 2019-08-26 RX ADMIN — ENOXAPARIN SODIUM 40 MILLIGRAM(S): 100 INJECTION SUBCUTANEOUS at 11:54

## 2019-08-26 RX ADMIN — PANTOPRAZOLE SODIUM 40 MILLIGRAM(S): 20 TABLET, DELAYED RELEASE ORAL at 06:20

## 2019-08-26 RX ADMIN — Medication 81 MILLIGRAM(S): at 11:52

## 2019-08-26 NOTE — PROGRESS NOTE ADULT - ASSESSMENT
84 yo F with CAD, CABG, HTN, GERD pw sob/linn. Mild lower abd pain/soreness as well. Loose stools noted. Better on oxygen/diureses. Had increased water intake over the last few days.  repeat echo with LVEF 55%, DD, mild-mod MR, mild MS, mod AI, mild pulm HTN.   Tele: sinus 60s    PLAN:     -no overnight tele events, HRs 60s, will dc tele   -cont asa/statin/lisinopril/metoprolol  -cont diuresis, currently on Po Lasix  -monitor lytes/creat and K with diuresis  - 84 yo F with CAD, CABG, HTN, GERD pw sob/linn. Mild lower abd pain/soreness as well. Loose stools noted. Better on oxygen/diureses. Had increased water intake over the last few days.  repeat echo with LVEF 55%, DD, mild-mod MR, mild MS, mod AI, mild pulm HTN.   Tele: sinus 60s  with c/o difficulty breathing 2/2 abd bloating /constipation      PLAN:     -cont tele  -cont asa/statin/lisinopril/metoprolol  -cont diuresis, currently on Po Lasix, will give extra dosing if sob persists   -monitor lytes/creat and K with diuresis  - 84 yo F with CAD, CABG, HTN, GERD pw sob/linn. Mild lower abd pain/soreness as well. Loose stools noted. Better on oxygen/diureses. Had increased water intake over the last few days.  repeat echo with LVEF 55%, grade III DD, mild-mod MR, mild MS, mod AR, mild pulm HTN.   BNP 12403  Trop neg x 2  Chest x ray: Increased lung volume is consistent with COPD. There are mild to moderate basilar effusions left greater than right. These are somewhat increased from August 22.  Tele: sinus 60s  with c/o difficulty breathing 2/2 abd bloating /constipation     ·	Acute on chronic diastolic HF  ·	B/L pleural effusions   ·	CAD  ·	HTN  ·	GERD     PLAN:     -cont tele  -cont with supplemental O2 as needed, will add Duoneb   -cont asa/statin/lisinopril/metoprolol  -cont diuresis, currently on Po Lasix, will give extra dosing if sob persists, will repeat BNP in am  -monitor lytes/creat and K with diuresis  -c/o abd bloating and constipation, Maalox as per primary team, ? GI eval, cont with PPI

## 2019-08-26 NOTE — PROGRESS NOTE ADULT - SUBJECTIVE AND OBJECTIVE BOX
Patient is a 85y old  Female who presents with a chief complaint of SOB/DILLARD (26 Aug 2019 09:11)    PAST MEDICAL & SURGICAL HISTORY:  Coronary bypass graft mechanical complication  ST elevation myocardial infarction (STEMI), unspecified artery  BETSY (acute kidney injury)  Insomnia, unspecified type  Gastroesophageal reflux disease without esophagitis  Acute congestive heart failure, unspecified congestive heart failure type  Hypertension  S/P CABG x 1: 9 days ago  S/P TKR (total knee replacement) using cement, bilateral    INTERVAL HISTORY: resting in bed, not in any acute distress   	  MEDICATIONS:  MEDICATIONS  (STANDING):  ALBUTerol    0.083%. 2.5 milliGRAM(s) Nebulizer once  aspirin enteric coated 81 milliGRAM(s) Oral daily  atorvastatin 20 milliGRAM(s) Oral at bedtime  enoxaparin Injectable 40 milliGRAM(s) SubCutaneous daily  FLUoxetine 40 milliGRAM(s) Oral daily  furosemide    Tablet 40 milliGRAM(s) Oral daily  lisinopril 20 milliGRAM(s) Oral daily  metoprolol tartrate 50 milliGRAM(s) Oral two times a day  pantoprazole    Tablet 40 milliGRAM(s) Oral before breakfast    MEDICATIONS  (PRN):  acetaminophen   Tablet .. 650 milliGRAM(s) Oral every 6 hours PRN Mild Pain (1 - 3)  zolpidem 5 milliGRAM(s) Oral at bedtime PRN Insomnia      Vitals:  T(F): 97.5 (08-26-19 @ 10:55), Max: 97.6 (08-25-19 @ 17:06)  HR: 56 (08-26-19 @ 10:55) (56 - 60)  BP: 142/51 (08-26-19 @ 10:55) (136/38 - 176/62)  RR: 16 (08-26-19 @ 10:55) (16 - 18)  SpO2: 100% (08-26-19 @ 10:55) (98% - 100%)  Wt(kg): --59 kg    08-25 @ 07:01  -  08-26 @ 07:00  --------------------------------------------------------  IN:    Oral Fluid: 120 mL  Total IN: 120 mL    OUT:    Voided: 400 mL  Total OUT: 400 mL    Total NET: -280 mL    PHYSICAL EXAM:  Neuro: Awake, responsive  CV: S1 S2 RRR  Lungs: CTABL  GI: Soft, BS +, ND, NT  Extremities: No edema    TELEMETRY: RSR    RADIOLOGY:< from: Xray Chest 1 View-PORTABLE IMMEDIATE (08.25.19 @ 11:36) >  Gross heart enlargement and sternotomy again noted.    Increased lung volume is consistent with COPD.    There are mild to moderate basilar effusions left greater than right.   These are somewhat increased from August 22.    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    IMPRESSION: Increasing effusions particularly on the right.    < end of copied text >     DIAGNOSTIC TESTING:    [x ] Echocardiogram: < from: TTE Echo Doppler w/o Cont (08.23.19 @ 16:30) >   1. Left ventricular ejection fraction, by visual estimation, is 55 to   60%.   2. Normal left ventricular internal cavity size.   3. Spectral Doppler shows restrictive pattern of left ventricular  myocardial filling (Grade III diastolic dysfunction).   4. Normal right ventricular size and function.   5. The left atrium is normal in size.   6. The right atrium is normal in size.   7. There is no evidence of pericardial effusion.   8. Mild to moderate mitral annular calcification.   9. Mild to moderate mitral valve regurgitation.  10. Thickening of the anterior and posterior mitral valve leaflets.  11. Structurally normal tricuspid valve, with normal leaflet excursion.  12. Moderate aorticregurgitation.  13. Structurally normal pulmonic valve, with normal leaflet excursion.  14. Estimated pulmonary artery systolic pressure is 47.9 mmHg assuming a   right atrial pressure of 10 mmHg, which is consistent with mild pulmonary   hypertension.  15. Mitral valve mean gradient is 4.9 mmHg consistent with mild mitral   stenosis.    < end of copied text >    [x ]  Catheterization: < from: Cardiac Cath Lab - Adult (04.18.16 @ 18:01) >  VENTRICLES: Global left ventricular function was normal. EF estimated was  60 %.  CORONARY VESSELS: The coronary circulation is right dominant.  LM:   --  Distal left main: There was a 60 % stenosis.  LAD:   --  Ostial LAD: There was a 90 % stenosis.  --  Mid LAD: There was a 90 % stenosis.  CX:   --  Proximal circumflex: There was a 90 % stenosis.  RCA:   --  Mid RCA: There was a 100 % stenosis.  COMPLICATIONS: There were no complications.  DIAGNOSTIC RECOMMENDATIONS: Consultation with a cardiac surgeon will be  obtained for coronary artery bypass grafting.    < end of copied text >    LABS:	 	    CARDIAC MARKERS:  Troponin I, Serum: .015 ng/mL (08-25 @ 11:49)    25 Aug 2019 11:47    132    |  93     |  38     ----------------------------<  131    4.5     |  29     |  1.04     Ca    8.8        25 Aug 2019 11:47  Phos  4.5       25 Aug 2019 11:49  Mg     2.1       25 Aug 2019 11:49                       11.2   9.24  )-----------( 296      ( 25 Aug 2019 11:47 )             35.3 Patient is a 85y old  Female who presents with a chief complaint of SOB/DILLARD (26 Aug 2019 09:11)    PAST MEDICAL & SURGICAL HISTORY:  Coronary bypass graft mechanical complication  ST elevation myocardial infarction (STEMI), unspecified artery  BETSY (acute kidney injury)  Insomnia, unspecified type  Gastroesophageal reflux disease without esophagitis  Acute congestive heart failure, unspecified congestive heart failure type  Hypertension  S/P CABG x 1: 9 days ago  S/P TKR (total knee replacement) using cement, bilateral    INTERVAL HISTORY: resting in bed, not in any acute distress, wilt mild sob, pt states she has difficulty  breathing  because of bloating in her belly and constipation    	  MEDICATIONS:  MEDICATIONS  (STANDING):  ALBUTerol    0.083%. 2.5 milliGRAM(s) Nebulizer once  aspirin enteric coated 81 milliGRAM(s) Oral daily  atorvastatin 20 milliGRAM(s) Oral at bedtime  enoxaparin Injectable 40 milliGRAM(s) SubCutaneous daily  FLUoxetine 40 milliGRAM(s) Oral daily  furosemide    Tablet 40 milliGRAM(s) Oral daily  lisinopril 20 milliGRAM(s) Oral daily  metoprolol tartrate 50 milliGRAM(s) Oral two times a day  pantoprazole    Tablet 40 milliGRAM(s) Oral before breakfast    MEDICATIONS  (PRN):  acetaminophen   Tablet .. 650 milliGRAM(s) Oral every 6 hours PRN Mild Pain (1 - 3)  zolpidem 5 milliGRAM(s) Oral at bedtime PRN Insomnia    Vitals:  T(F): 97.5 (08-26-19 @ 10:55), Max: 97.6 (08-25-19 @ 17:06)  HR: 56 (08-26-19 @ 10:55) (56 - 60)  BP: 142/51 (08-26-19 @ 10:55) (136/38 - 176/62)  RR: 16 (08-26-19 @ 10:55) (16 - 18)  SpO2: 100% (08-26-19 @ 10:55) (98% - 100%)  Wt(kg): --59 kg    08-25 @ 07:01  -  08-26 @ 07:00  --------------------------------------------------------  IN:    Oral Fluid: 120 mL  Total IN: 120 mL    OUT:    Voided: 400 mL  Total OUT: 400 mL    Total NET: -280 mL    PHYSICAL EXAM:  Neuro: Awake, responsive  CV: S1 S2 RRR, + DM  Lungs: bibasilar rales   GI: Soft, BS +, ND, NT  Extremities: No edema    TELEMETRY: RSR    RADIOLOGY:< from: Xray Chest 1 View-PORTABLE IMMEDIATE (08.25.19 @ 11:36) >  Gross heart enlargement and sternotomy again noted.    Increased lung volume is consistent with COPD.    There are mild to moderate basilar effusions left greater than right.   These are somewhat increased from August 22.    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    IMPRESSION: Increasing effusions particularly on the right.    < end of copied text >     DIAGNOSTIC TESTING:    [x ] Echocardiogram: < from: TTE Echo Doppler w/o Cont (08.23.19 @ 16:30) >   1. Left ventricular ejection fraction, by visual estimation, is 55 to   60%.   2. Normal left ventricular internal cavity size.   3. Spectral Doppler shows restrictive pattern of left ventricular  myocardial filling (Grade III diastolic dysfunction).   4. Normal right ventricular size and function.   5. The left atrium is normal in size.   6. The right atrium is normal in size.   7. There is no evidence of pericardial effusion.   8. Mild to moderate mitral annular calcification.   9. Mild to moderate mitral valve regurgitation.  10. Thickening of the anterior and posterior mitral valve leaflets.  11. Structurally normal tricuspid valve, with normal leaflet excursion.  12. Moderate aorticregurgitation.  13. Structurally normal pulmonic valve, with normal leaflet excursion.  14. Estimated pulmonary artery systolic pressure is 47.9 mmHg assuming a   right atrial pressure of 10 mmHg, which is consistent with mild pulmonary   hypertension.  15. Mitral valve mean gradient is 4.9 mmHg consistent with mild mitral   stenosis.    < end of copied text >    [x ]  Catheterization: < from: Cardiac Cath Lab - Adult (04.18.16 @ 18:01) >  VENTRICLES: Global left ventricular function was normal. EF estimated was  60 %.  CORONARY VESSELS: The coronary circulation is right dominant.  LM:   --  Distal left main: There was a 60 % stenosis.  LAD:   --  Ostial LAD: There was a 90 % stenosis.  --  Mid LAD: There was a 90 % stenosis.  CX:   --  Proximal circumflex: There was a 90 % stenosis.  RCA:   --  Mid RCA: There was a 100 % stenosis.  COMPLICATIONS: There were no complications.  DIAGNOSTIC RECOMMENDATIONS: Consultation with a cardiac surgeon will be  obtained for coronary artery bypass grafting.    < end of copied text >    LABS:	 	    CARDIAC MARKERS:  Troponin I, Serum: .015 ng/mL (08-25 @ 11:49)    25 Aug 2019 11:47    132    |  93     |  38     ----------------------------<  131    4.5     |  29     |  1.04     Ca    8.8        25 Aug 2019 11:47  Phos  4.5       25 Aug 2019 11:49  Mg     2.1       25 Aug 2019 11:49                       11.2   9.24  )-----------( 296      ( 25 Aug 2019 11:47 )             35.3 Patient is a 85y old  Female who presents with a chief complaint of SOB/DILLARD (26 Aug 2019 09:11)    PAST MEDICAL & SURGICAL HISTORY:  Coronary bypass graft mechanical complication  ST elevation myocardial infarction (STEMI), unspecified artery  BETSY (acute kidney injury)  Insomnia, unspecified type  Gastroesophageal reflux disease without esophagitis  Acute congestive heart failure, unspecified congestive heart failure type  Hypertension  CAD  S/P CABG  S/P TKR (total knee replacement) using cement, bilateral    INTERVAL HISTORY: resting in bed, not in any acute distress, wilt mild sob, pt states she has difficulty  breathing  because of bloating in her belly and constipation    	  MEDICATIONS:  MEDICATIONS  (STANDING):  ALBUTerol    0.083%. 2.5 milliGRAM(s) Nebulizer once  aspirin enteric coated 81 milliGRAM(s) Oral daily  atorvastatin 20 milliGRAM(s) Oral at bedtime  enoxaparin Injectable 40 milliGRAM(s) SubCutaneous daily  FLUoxetine 40 milliGRAM(s) Oral daily  furosemide    Tablet 40 milliGRAM(s) Oral daily  lisinopril 20 milliGRAM(s) Oral daily  metoprolol tartrate 50 milliGRAM(s) Oral two times a day  pantoprazole    Tablet 40 milliGRAM(s) Oral before breakfast    MEDICATIONS  (PRN):  acetaminophen   Tablet .. 650 milliGRAM(s) Oral every 6 hours PRN Mild Pain (1 - 3)  zolpidem 5 milliGRAM(s) Oral at bedtime PRN Insomnia    Vitals:  T(F): 97.5 (08-26-19 @ 10:55), Max: 97.6 (08-25-19 @ 17:06)  HR: 56 (08-26-19 @ 10:55) (56 - 60)  BP: 142/51 (08-26-19 @ 10:55) (136/38 - 176/62)  RR: 16 (08-26-19 @ 10:55) (16 - 18)  SpO2: 100% (08-26-19 @ 10:55) (98% - 100%)  Wt(kg): --59 kg    08-25 @ 07:01  -  08-26 @ 07:00  --------------------------------------------------------  IN:    Oral Fluid: 120 mL  Total IN: 120 mL    OUT:    Voided: 400 mL  Total OUT: 400 mL    Total NET: -280 mL    PHYSICAL EXAM:  Neuro: Awake, responsive  CV: S1 S2 RRR, + DM  Lungs: bibasilar rales   GI: Soft, BS +, ND, NT  Extremities: No edema    TELEMETRY: RSR    RADIOLOGY:< from: Xray Chest 1 View-PORTABLE IMMEDIATE (08.25.19 @ 11:36) >  Gross heart enlargement and sternotomy again noted.    Increased lung volume is consistent with COPD.    There are mild to moderate basilar effusions left greater than right.   These are somewhat increased from August 22.    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    IMPRESSION: Increasing effusions particularly on the right.    < end of copied text >     DIAGNOSTIC TESTING:    [x ] Echocardiogram: < from: TTE Echo Doppler w/o Cont (08.23.19 @ 16:30) >   1. Left ventricular ejection fraction, by visual estimation, is 55 to   60%.   2. Normal left ventricular internal cavity size.   3. Spectral Doppler shows restrictive pattern of left ventricular  myocardial filling (Grade III diastolic dysfunction).   4. Normal right ventricular size and function.   5. The left atrium is normal in size.   6. The right atrium is normal in size.   7. There is no evidence of pericardial effusion.   8. Mild to moderate mitral annular calcification.   9. Mild to moderate mitral valve regurgitation.  10. Thickening of the anterior and posterior mitral valve leaflets.  11. Structurally normal tricuspid valve, with normal leaflet excursion.  12. Moderate aorticregurgitation.  13. Structurally normal pulmonic valve, with normal leaflet excursion.  14. Estimated pulmonary artery systolic pressure is 47.9 mmHg assuming a   right atrial pressure of 10 mmHg, which is consistent with mild pulmonary   hypertension.  15. Mitral valve mean gradient is 4.9 mmHg consistent with mild mitral   stenosis.    < end of copied text >    [x ]  Catheterization: < from: Cardiac Cath Lab - Adult (04.18.16 @ 18:01) >  VENTRICLES: Global left ventricular function was normal. EF estimated was  60 %.  CORONARY VESSELS: The coronary circulation is right dominant.  LM:   --  Distal left main: There was a 60 % stenosis.  LAD:   --  Ostial LAD: There was a 90 % stenosis.  --  Mid LAD: There was a 90 % stenosis.  CX:   --  Proximal circumflex: There was a 90 % stenosis.  RCA:   --  Mid RCA: There was a 100 % stenosis.  COMPLICATIONS: There were no complications.  DIAGNOSTIC RECOMMENDATIONS: Consultation with a cardiac surgeon will be  obtained for coronary artery bypass grafting.    < end of copied text >    LABS:	 	    CARDIAC MARKERS:  Troponin I, Serum: .015 ng/mL (08-25 @ 11:49)    25 Aug 2019 11:47    132    |  93     |  38     ----------------------------<  131    4.5     |  29     |  1.04     Ca    8.8        25 Aug 2019 11:47  Phos  4.5       25 Aug 2019 11:49  Mg     2.1       25 Aug 2019 11:49                       11.2   9.24  )-----------( 296      ( 25 Aug 2019 11:47 )             35.3

## 2019-08-26 NOTE — PROGRESS NOTE ADULT - ATTENDING COMMENTS
Chart reviewed.  Still evidence of persistent diastolic heart failure and in fact, no weight loss has been recorded since admission.  Continue oral Lasix (noted increased BUN today) and reassess daily, may require IV Lasix.  Some of her GI complaints may be due to pleural effusions.

## 2019-08-26 NOTE — PROGRESS NOTE ADULT - SUBJECTIVE AND OBJECTIVE BOX
86 yo lady on meds. feels a bit better.      Vital Signs Last 24 Hrs  T(C): 36.4 (26 Aug 2019 05:12), Max: 36.4 (25 Aug 2019 17:06)  T(F): 97.5 (26 Aug 2019 05:12), Max: 97.6 (25 Aug 2019 17:06)  HR: 59 (26 Aug 2019 05:12) (59 - 68)  BP: 136/38 (26 Aug 2019 05:12) (136/38 - 176/62)  BP(mean): --  RR: 16 (26 Aug 2019 05:12) (16 - 18)  SpO2: 98% (26 Aug 2019 05:12) (98% - 100%)                          11.2   9.24  )-----------( 296      ( 25 Aug 2019 11:47 )             35.3   MEDICATIONS  (STANDING):  ALBUTerol    0.083%. 2.5 milliGRAM(s) Nebulizer once  aspirin enteric coated 81 milliGRAM(s) Oral daily  atorvastatin 20 milliGRAM(s) Oral at bedtime  enoxaparin Injectable 40 milliGRAM(s) SubCutaneous daily  FLUoxetine 40 milliGRAM(s) Oral daily  furosemide    Tablet 40 milliGRAM(s) Oral daily  lisinopril 20 milliGRAM(s) Oral daily  metoprolol tartrate 50 milliGRAM(s) Oral two times a day  pantoprazole    Tablet 40 milliGRAM(s) Oral before breakfast    MEDICATIONS  (PRN):  acetaminophen   Tablet .. 650 milliGRAM(s) Oral every 6 hours PRN Mild Pain (1 - 3)  zolpidem 5 milliGRAM(s) Oral at bedtime PRN Insomnia

## 2019-08-27 LAB
ANION GAP SERPL CALC-SCNC: 10 MMOL/L — SIGNIFICANT CHANGE UP (ref 5–17)
BUN SERPL-MCNC: 38 MG/DL — HIGH (ref 7–23)
CALCIUM SERPL-MCNC: 8.5 MG/DL — SIGNIFICANT CHANGE UP (ref 8.5–10.1)
CHLORIDE SERPL-SCNC: 95 MMOL/L — LOW (ref 96–108)
CO2 SERPL-SCNC: 27 MMOL/L — SIGNIFICANT CHANGE UP (ref 22–31)
CREAT SERPL-MCNC: 0.81 MG/DL — SIGNIFICANT CHANGE UP (ref 0.5–1.3)
GLUCOSE SERPL-MCNC: 91 MG/DL — SIGNIFICANT CHANGE UP (ref 70–99)
NT-PROBNP SERPL-SCNC: 8940 PG/ML — HIGH (ref 0–450)
POTASSIUM SERPL-MCNC: 4.6 MMOL/L — SIGNIFICANT CHANGE UP (ref 3.5–5.3)
POTASSIUM SERPL-SCNC: 4.6 MMOL/L — SIGNIFICANT CHANGE UP (ref 3.5–5.3)
SODIUM SERPL-SCNC: 132 MMOL/L — LOW (ref 135–145)

## 2019-08-27 PROCEDURE — 99232 SBSQ HOSP IP/OBS MODERATE 35: CPT

## 2019-08-27 RX ORDER — FUROSEMIDE 40 MG
20 TABLET ORAL ONCE
Refills: 0 | Status: COMPLETED | OUTPATIENT
Start: 2019-08-27 | End: 2019-08-27

## 2019-08-27 RX ADMIN — Medication 100 MILLIGRAM(S): at 21:09

## 2019-08-27 RX ADMIN — Medication 100 MILLIGRAM(S): at 05:52

## 2019-08-27 RX ADMIN — Medication 81 MILLIGRAM(S): at 11:02

## 2019-08-27 RX ADMIN — Medication 650 MILLIGRAM(S): at 23:45

## 2019-08-27 RX ADMIN — LISINOPRIL 20 MILLIGRAM(S): 2.5 TABLET ORAL at 05:53

## 2019-08-27 RX ADMIN — Medication 40 MILLIGRAM(S): at 11:02

## 2019-08-27 RX ADMIN — Medication 650 MILLIGRAM(S): at 18:22

## 2019-08-27 RX ADMIN — ZOLPIDEM TARTRATE 5 MILLIGRAM(S): 10 TABLET ORAL at 21:09

## 2019-08-27 RX ADMIN — Medication 50 MILLIGRAM(S): at 17:31

## 2019-08-27 RX ADMIN — PANTOPRAZOLE SODIUM 40 MILLIGRAM(S): 20 TABLET, DELAYED RELEASE ORAL at 05:53

## 2019-08-27 RX ADMIN — ENOXAPARIN SODIUM 40 MILLIGRAM(S): 100 INJECTION SUBCUTANEOUS at 11:02

## 2019-08-27 RX ADMIN — Medication 3 MILLILITER(S): at 17:07

## 2019-08-27 RX ADMIN — Medication 3 MILLILITER(S): at 11:14

## 2019-08-27 RX ADMIN — Medication 50 MILLIGRAM(S): at 05:53

## 2019-08-27 RX ADMIN — Medication 3 MILLILITER(S): at 23:50

## 2019-08-27 RX ADMIN — Medication 20 MILLIGRAM(S): at 12:58

## 2019-08-27 RX ADMIN — Medication 100 MILLIGRAM(S): at 11:02

## 2019-08-27 RX ADMIN — Medication 650 MILLIGRAM(S): at 17:33

## 2019-08-27 RX ADMIN — Medication 40 MILLIGRAM(S): at 05:53

## 2019-08-27 RX ADMIN — ATORVASTATIN CALCIUM 20 MILLIGRAM(S): 80 TABLET, FILM COATED ORAL at 21:09

## 2019-08-27 RX ADMIN — Medication 3 MILLILITER(S): at 05:31

## 2019-08-27 NOTE — PROGRESS NOTE ADULT - ASSESSMENT
86 yo F with CAD, CABG, HTN, GERD pw sob/linn. Mild lower abd pain/soreness as well. Loose stools noted. Better on oxygen/diureses. Had increased water intake over the last few days.  repeat echo with LVEF 55%, grade III DD, mild-mod MR, mild MS, mod AR, mild pulm HTN.   BNP 61761 ->8900  Trop neg x 2  Chest x ray: Increased lung volume is consistent with COPD. There are mild to moderate basilar effusions left greater than right. These are somewhat increased from August 22.  Tele: sinus 60s  with c/o difficulty breathing    ·	Acute on chronic diastolic HF  ·	B/L pleural effusions   ·	CAD  ·	HTN  ·	GERD     PLAN:     -cont tele  -cont with supplemental O2/Duoneb   -cont asa/statin/lisinopril/metoprolol  -cont diuresis, currently on Po Lasix, no significant weight  loss noted, and pt still with c/o sob will switch to IV Lasix    -monitor lytes/creat with diuresis  -c/o abd bloating and constipation, reported to have BMs as per documentation last night 84 yo F with CAD, CABG, HTN, GERD pw sob/linn. Mild lower abd pain/soreness as well. Loose stools noted. Better on oxygen/diureses. Had increased water intake over the last few days.  repeat echo with LVEF 55%, grade III DD, mild-mod MR, mild MS, mod AR, mild pulm HTN.   BNP 42890 ->8900  Trop neg x 2  Chest x ray: Increased lung volume is consistent with COPD. There are mild to moderate basilar effusions left greater than right. These are somewhat increased from August 22.  Tele: sinus 60s  with c/o difficulty breathing    ·	Acute on chronic diastolic HF  ·	B/L pleural effusions   ·	CAD  ·	HTN  ·	GERD     PLAN:     -cont tele  -cont with supplemental O2/Duoneb   -cont asa/statin/lisinopril/metoprolol  -cont diuresis, currently on Po Lasix, no significant weight  loss noted, and pt still with c/o sob will switch to IV Lasix    -monitor lytes/creat with diuresis  -c/o abd bloating and constipation, reported to have BMs as per documentation last night, currently on PPI, colace, and Maalox PRN  -Pt lives alone at home, and gets her meals from wheels, case management/ SW referral   -PT eval once breathing improves 86 yo F with CAD, CABG, HTN, GERD pw sob/linn. Mild lower abd pain/soreness as well. Loose stools noted. Better on oxygen/diureses. Had increased water intake over the last few days.  repeat echo with LVEF 55%, grade III DD, mild-mod MR, mild MS, mod AR, mild pulm HTN.   BNP 64582 ->8900  Trop neg x 2  Chest x ray: Increased lung volume is consistent with COPD. There are mild to moderate basilar effusions left greater than right. These are somewhat increased from August 22.  Tele: sinus 60s  with c/o difficulty breathing    ·	Acute on chronic diastolic HF  ·	B/L pleural effusions   ·	CAD  ·	HTN  ·	GERD     PLAN:     -cont tele  -cont with supplemental O2/Duoneb   -cont asa/statin/lisinopril/metoprolol  -currently on Po Lasix, no significant weight  loss noted, and pt still with c/o sob, BUN 12->38->38, chest x-ray with b/l pleural effusions, will obtain Chest CT to further assess the need for possible thoracentesis   -monitor lytes/creat with diuresis  -c/o abd bloating and constipation, reported to have BMs as per documentation last night, currently on PPI, colace, and Maalox PRN  -Pt lives alone at home, and gets her meals from wheels, case management/ SW referral   -PT eval once breathing improves

## 2019-08-27 NOTE — PROGRESS NOTE ADULT - SUBJECTIVE AND OBJECTIVE BOX
Patient is a 85y old  Female who presents with a chief complaint of SOB/DILLARD (27 Aug 2019 09:14)    PAST MEDICAL & SURGICAL HISTORY:  Coronary bypass graft mechanical complication  ST elevation myocardial infarction (STEMI), unspecified artery  BETSY (acute kidney injury)  Insomnia, unspecified type  Gastroesophageal reflux disease without esophagitis  Acute congestive heart failure, unspecified congestive heart failure type  Hypertension  S/P CABG   S/P TKR (total knee replacement) using cement, bilateral     INTERVAL HISTORY: Resting in bed, still with c/o shortness of breath   	  MEDICATIONS:  MEDICATIONS  (STANDING):  ALBUTerol    0.083%. 2.5 milliGRAM(s) Nebulizer once  ALBUTerol/ipratropium for Nebulization 3 milliLiter(s) Nebulizer every 6 hours  aspirin enteric coated 81 milliGRAM(s) Oral daily  atorvastatin 20 milliGRAM(s) Oral at bedtime  docusate sodium 100 milliGRAM(s) Oral three times a day  enoxaparin Injectable 40 milliGRAM(s) SubCutaneous daily  FLUoxetine 40 milliGRAM(s) Oral daily  furosemide    Tablet 40 milliGRAM(s) Oral daily  lisinopril 20 milliGRAM(s) Oral daily  metoprolol tartrate 50 milliGRAM(s) Oral two times a day  pantoprazole    Tablet 40 milliGRAM(s) Oral before breakfast    MEDICATIONS  (PRN):  acetaminophen   Tablet .. 650 milliGRAM(s) Oral every 6 hours PRN Mild Pain (1 - 3)  aluminum hydroxide/magnesium hydroxide/simethicone Suspension 30 milliLiter(s) Oral every 4 hours PRN Dyspepsia  zolpidem 5 milliGRAM(s) Oral at bedtime PRN Insomnia    Vitals:  T(F): 97.2 (08-27-19 @ 05:12), Max: 97.5 (08-26-19 @ 17:56)  HR: 57 (08-27-19 @ 05:12) (54 - 68)  BP: 171/73 (08-27-19 @ 05:12) (157/63 - 171/73)  RR: 20 (08-27-19 @ 05:12) (17 - 20)  SpO2: 100% (08-27-19 @ 05:12) (98% - 100%)  Wt(kg): --59 kg    08-26 @ 07:01  -  08-27 @ 07:00  --------------------------------------------------------  IN:    Oral Fluid: 360 mL  Total IN: 360 mL    OUT:    Voided: 550 mL  Total OUT: 550 mL    Total NET: -190 mL    PHYSICAL EXAM:  Neuro: Awake, responsive  CV: S1 S2 RRR, + DM  Lungs:   GI: Soft, BS +, ND, NT  Extremities: No edema    TELEMETRY: RSR    RADIOLOGY: < from: Xray Chest 1 View-PORTABLE IMMEDIATE (08.25.19 @ 11:36) >  Gross heart enlargement and sternotomy again noted.    Increased lung volume is consistent with COPD.    There are mild to moderate basilar effusions left greater than right.   These are somewhat increased from August 22.    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    IMPRESSION: Increasing effusions particularly on the right. Other   findings as above.    < end of copied text >    DIAGNOSTIC TESTING:    [x ] Echocardiogram:< from: TTE Echo Doppler w/o Cont (08.23.19 @ 16:30) >   1. Left ventricular ejection fraction, by visual estimation, is 55 to   60%.   2. Normal left ventricular internal cavity size.   3. Spectral Doppler shows restrictive pattern of left ventricular  myocardial filling (Grade III diastolic dysfunction).   4. Normal right ventricular size and function.   5. The left atrium is normal in size.   6. The right atrium is normal in size.   7. There is no evidence of pericardial effusion.   8. Mild to moderate mitral annular calcification.   9. Mild to moderate mitral valve regurgitation.  10. Thickening of the anterior and posterior mitral valve leaflets.  11. Structurally normal tricuspid valve, with normal leaflet excursion.  12. Moderate aorticregurgitation.  13. Structurally normal pulmonic valve, with normal leaflet excursion.  14. Estimated pulmonary artery systolic pressure is 47.9 mmHg assuming a   right atrial pressure of 10 mmHg, which is consistent with mild pulmonary   hypertension.  15. Mitral valve mean gradient is 4.9 mmHg consistent with mild mitral   stenosis.    < end of copied text >    [x ]  Catheterization:< from: Cardiac Cath Lab - Adult (04.18.16 @ 18:01) >  VENTRICLES: Global left ventricular function was normal. EF estimated was  60 %.  CORONARY VESSELS: The coronary circulation is right dominant.  LM:   --  Distal left main: There was a 60 % stenosis.  LAD:   --  Ostial LAD: There was a 90 % stenosis.  --  Mid LAD: There was a 90 % stenosis.  CX:   --  Proximal circumflex: There was a 90 % stenosis.  RCA:   --  Mid RCA: There was a 100 % stenosis.    < end of copied text >    LABS:	 	    CARDIAC MARKERS:  Troponin I, Serum: .015 ng/mL (08-25 @ 11:49)    27 Aug 2019 07:29    132    |  95     |  38     ----------------------------<  91     4.6     |  27     |  0.81   25 Aug 2019 11:47    132    |  93     |  38     ----------------------------<  131    4.5     |  29     |  1.04     Ca    8.5        27 Aug 2019 07:29  Phos  4.5       25 Aug 2019 11:49  Mg     2.1       25 Aug 2019 11:49                       11.2   9.24  )-----------( 296      ( 25 Aug 2019 11:47 )             35.3   proBNP: Serum Pro-Brain Natriuretic Peptide: 8940 pg/mL (08-27 @ 07:29) Patient is a 85y old  Female who presents with a chief complaint of SOB/DILLARD (27 Aug 2019 09:14)    PAST MEDICAL & SURGICAL HISTORY:  Coronary bypass graft mechanical complication  ST elevation myocardial infarction (STEMI), unspecified artery  BETSY (acute kidney injury)  Insomnia, unspecified type  Gastroesophageal reflux disease without esophagitis  Acute congestive heart failure, unspecified congestive heart failure type  Hypertension  S/P CABG   S/P TKR (total knee replacement) using cement, bilateral     INTERVAL HISTORY: Resting in bed, still with c/o shortness of breath    	  MEDICATIONS:  MEDICATIONS  (STANDING):  ALBUTerol    0.083%. 2.5 milliGRAM(s) Nebulizer once  ALBUTerol/ipratropium for Nebulization 3 milliLiter(s) Nebulizer every 6 hours  aspirin enteric coated 81 milliGRAM(s) Oral daily  atorvastatin 20 milliGRAM(s) Oral at bedtime  docusate sodium 100 milliGRAM(s) Oral three times a day  enoxaparin Injectable 40 milliGRAM(s) SubCutaneous daily  FLUoxetine 40 milliGRAM(s) Oral daily  furosemide    Tablet 40 milliGRAM(s) Oral daily  lisinopril 20 milliGRAM(s) Oral daily  metoprolol tartrate 50 milliGRAM(s) Oral two times a day  pantoprazole    Tablet 40 milliGRAM(s) Oral before breakfast    MEDICATIONS  (PRN):  acetaminophen   Tablet .. 650 milliGRAM(s) Oral every 6 hours PRN Mild Pain (1 - 3)  aluminum hydroxide/magnesium hydroxide/simethicone Suspension 30 milliLiter(s) Oral every 4 hours PRN Dyspepsia  zolpidem 5 milliGRAM(s) Oral at bedtime PRN Insomnia    Vitals:  T(F): 97.2 (08-27-19 @ 05:12), Max: 97.5 (08-26-19 @ 17:56)  HR: 57 (08-27-19 @ 05:12) (54 - 68)  BP: 171/73 (08-27-19 @ 05:12) (157/63 - 171/73)  RR: 20 (08-27-19 @ 05:12) (17 - 20)  SpO2: 100% (08-27-19 @ 05:12) (98% - 100%)  Wt(kg): --59 kg    08-26 @ 07:01  -  08-27 @ 07:00  --------------------------------------------------------  IN:    Oral Fluid: 360 mL  Total IN: 360 mL    OUT:    Voided: 550 mL  Total OUT: 550 mL    Total NET: -190 mL    PHYSICAL EXAM:  Neuro: Awake, responsive  CV: S1 S2 RRR, + DM  Lungs: diminished to bases   GI: Soft, BS +, ND, NT, diffused tenderness to upper abdomen   Extremities: No edema    TELEMETRY: RSR    RADIOLOGY: < from: Xray Chest 1 View-PORTABLE IMMEDIATE (08.25.19 @ 11:36) >  Gross heart enlargement and sternotomy again noted.    Increased lung volume is consistent with COPD.    There are mild to moderate basilar effusions left greater than right.   These are somewhat increased from August 22.    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    IMPRESSION: Increasing effusions particularly on the right. Other   findings as above.    < end of copied text >    DIAGNOSTIC TESTING:    [x ] Echocardiogram:< from: TTE Echo Doppler w/o Cont (08.23.19 @ 16:30) >   1. Left ventricular ejection fraction, by visual estimation, is 55 to   60%.   2. Normal left ventricular internal cavity size.   3. Spectral Doppler shows restrictive pattern of left ventricular  myocardial filling (Grade III diastolic dysfunction).   4. Normal right ventricular size and function.   5. The left atrium is normal in size.   6. The right atrium is normal in size.   7. There is no evidence of pericardial effusion.   8. Mild to moderate mitral annular calcification.   9. Mild to moderate mitral valve regurgitation.  10. Thickening of the anterior and posterior mitral valve leaflets.  11. Structurally normal tricuspid valve, with normal leaflet excursion.  12. Moderate aorticregurgitation.  13. Structurally normal pulmonic valve, with normal leaflet excursion.  14. Estimated pulmonary artery systolic pressure is 47.9 mmHg assuming a   right atrial pressure of 10 mmHg, which is consistent with mild pulmonary   hypertension.  15. Mitral valve mean gradient is 4.9 mmHg consistent with mild mitral   stenosis.    < end of copied text >    [x ]  Catheterization:< from: Cardiac Cath Lab - Adult (04.18.16 @ 18:01) >  VENTRICLES: Global left ventricular function was normal. EF estimated was  60 %.  CORONARY VESSELS: The coronary circulation is right dominant.  LM:   --  Distal left main: There was a 60 % stenosis.  LAD:   --  Ostial LAD: There was a 90 % stenosis.  --  Mid LAD: There was a 90 % stenosis.  CX:   --  Proximal circumflex: There was a 90 % stenosis.  RCA:   --  Mid RCA: There was a 100 % stenosis.    < end of copied text >    LABS:	 	    CARDIAC MARKERS:  Troponin I, Serum: .015 ng/mL (08-25 @ 11:49)    27 Aug 2019 07:29    132    |  95     |  38     ----------------------------<  91     4.6     |  27     |  0.81   25 Aug 2019 11:47    132    |  93     |  38     ----------------------------<  131    4.5     |  29     |  1.04     Ca    8.5        27 Aug 2019 07:29  Phos  4.5       25 Aug 2019 11:49  Mg     2.1       25 Aug 2019 11:49                       11.2   9.24  )-----------( 296      ( 25 Aug 2019 11:47 )             35.3   proBNP: Serum Pro-Brain Natriuretic Peptide: 8940 pg/mL (08-27 @ 07:29)

## 2019-08-27 NOTE — PROGRESS NOTE ADULT - SUBJECTIVE AND OBJECTIVE BOX
86 yo lady on meds for CHF. Still notes DILLARD.    Vital Signs Last 24 Hrs  T(C): 36.2 (27 Aug 2019 05:12), Max: 36.4 (26 Aug 2019 10:55)  T(F): 97.2 (27 Aug 2019 05:12), Max: 97.5 (26 Aug 2019 10:55)  HR: 57 (27 Aug 2019 05:12) (54 - 68)  BP: 171/73 (27 Aug 2019 05:12) (142/51 - 171/73)  BP(mean): --  RR: 20 (27 Aug 2019 05:12) (16 - 20)  SpO2: 100% (27 Aug 2019 05:12) (98% - 100%)                          11.2   9.24  )-----------( 296      ( 25 Aug 2019 11:47 )             35.3   08-27    132<L>  |  95<L>  |  38<H>  ----------------------------<  91  4.6   |  27  |  0.81    Ca    8.5      27 Aug 2019 07:29  Phos  4.5     08-25  Mg     2.1     08-25    MEDICATIONS  (STANDING):  ALBUTerol    0.083%. 2.5 milliGRAM(s) Nebulizer once  ALBUTerol/ipratropium for Nebulization 3 milliLiter(s) Nebulizer every 6 hours  aspirin enteric coated 81 milliGRAM(s) Oral daily  atorvastatin 20 milliGRAM(s) Oral at bedtime  docusate sodium 100 milliGRAM(s) Oral three times a day  enoxaparin Injectable 40 milliGRAM(s) SubCutaneous daily  FLUoxetine 40 milliGRAM(s) Oral daily  furosemide    Tablet 40 milliGRAM(s) Oral daily  lisinopril 20 milliGRAM(s) Oral daily  metoprolol tartrate 50 milliGRAM(s) Oral two times a day  pantoprazole    Tablet 40 milliGRAM(s) Oral before breakfast    MEDICATIONS  (PRN):  acetaminophen   Tablet .. 650 milliGRAM(s) Oral every 6 hours PRN Mild Pain (1 - 3)  aluminum hydroxide/magnesium hydroxide/simethicone Suspension 30 milliLiter(s) Oral every 4 hours PRN Dyspepsia  zolpidem 5 milliGRAM(s) Oral at bedtime PRN Insomnia    BNP elevated.

## 2019-08-28 DIAGNOSIS — F41.9 ANXIETY DISORDER, UNSPECIFIED: ICD-10-CM

## 2019-08-28 LAB
ANION GAP SERPL CALC-SCNC: 7 MMOL/L — SIGNIFICANT CHANGE UP (ref 5–17)
BUN SERPL-MCNC: 29 MG/DL — HIGH (ref 7–23)
CALCIUM SERPL-MCNC: 8.5 MG/DL — SIGNIFICANT CHANGE UP (ref 8.5–10.1)
CHLORIDE SERPL-SCNC: 91 MMOL/L — LOW (ref 96–108)
CO2 SERPL-SCNC: 31 MMOL/L — SIGNIFICANT CHANGE UP (ref 22–31)
CREAT SERPL-MCNC: 0.76 MG/DL — SIGNIFICANT CHANGE UP (ref 0.5–1.3)
GLUCOSE SERPL-MCNC: 111 MG/DL — HIGH (ref 70–99)
HCT VFR BLD CALC: 32.6 % — LOW (ref 34.5–45)
HGB BLD-MCNC: 10.4 G/DL — LOW (ref 11.5–15.5)
MCHC RBC-ENTMCNC: 26.1 PG — LOW (ref 27–34)
MCHC RBC-ENTMCNC: 31.9 GM/DL — LOW (ref 32–36)
MCV RBC AUTO: 81.7 FL — SIGNIFICANT CHANGE UP (ref 80–100)
NRBC # BLD: 0 /100 WBCS — SIGNIFICANT CHANGE UP (ref 0–0)
PLATELET # BLD AUTO: 256 K/UL — SIGNIFICANT CHANGE UP (ref 150–400)
POTASSIUM SERPL-MCNC: 4 MMOL/L — SIGNIFICANT CHANGE UP (ref 3.5–5.3)
POTASSIUM SERPL-SCNC: 4 MMOL/L — SIGNIFICANT CHANGE UP (ref 3.5–5.3)
RBC # BLD: 3.99 M/UL — SIGNIFICANT CHANGE UP (ref 3.8–5.2)
RBC # FLD: 16.2 % — HIGH (ref 10.3–14.5)
SODIUM SERPL-SCNC: 129 MMOL/L — LOW (ref 135–145)
WBC # BLD: 6.72 K/UL — SIGNIFICANT CHANGE UP (ref 3.8–10.5)
WBC # FLD AUTO: 6.72 K/UL — SIGNIFICANT CHANGE UP (ref 3.8–10.5)

## 2019-08-28 PROCEDURE — 71250 CT THORAX DX C-: CPT | Mod: 26

## 2019-08-28 PROCEDURE — 99232 SBSQ HOSP IP/OBS MODERATE 35: CPT

## 2019-08-28 PROCEDURE — 90792 PSYCH DIAG EVAL W/MED SRVCS: CPT

## 2019-08-28 RX ORDER — FUROSEMIDE 40 MG
40 TABLET ORAL ONCE
Refills: 0 | Status: COMPLETED | OUTPATIENT
Start: 2019-08-28 | End: 2019-08-28

## 2019-08-28 RX ADMIN — Medication 40 MILLIGRAM(S): at 05:15

## 2019-08-28 RX ADMIN — PANTOPRAZOLE SODIUM 40 MILLIGRAM(S): 20 TABLET, DELAYED RELEASE ORAL at 05:15

## 2019-08-28 RX ADMIN — ATORVASTATIN CALCIUM 20 MILLIGRAM(S): 80 TABLET, FILM COATED ORAL at 22:27

## 2019-08-28 RX ADMIN — Medication 40 MILLIGRAM(S): at 11:39

## 2019-08-28 RX ADMIN — Medication 650 MILLIGRAM(S): at 22:52

## 2019-08-28 RX ADMIN — Medication 650 MILLIGRAM(S): at 22:27

## 2019-08-28 RX ADMIN — Medication 40 MILLIGRAM(S): at 11:42

## 2019-08-28 RX ADMIN — Medication 50 MILLIGRAM(S): at 16:47

## 2019-08-28 RX ADMIN — Medication 3 MILLILITER(S): at 05:22

## 2019-08-28 RX ADMIN — Medication 3 MILLILITER(S): at 23:47

## 2019-08-28 RX ADMIN — Medication 650 MILLIGRAM(S): at 06:00

## 2019-08-28 RX ADMIN — Medication 100 MILLIGRAM(S): at 14:39

## 2019-08-28 RX ADMIN — Medication 100 MILLIGRAM(S): at 22:27

## 2019-08-28 RX ADMIN — Medication 100 MILLIGRAM(S): at 05:15

## 2019-08-28 RX ADMIN — ZOLPIDEM TARTRATE 5 MILLIGRAM(S): 10 TABLET ORAL at 22:32

## 2019-08-28 RX ADMIN — Medication 650 MILLIGRAM(S): at 11:42

## 2019-08-28 RX ADMIN — Medication 650 MILLIGRAM(S): at 17:17

## 2019-08-28 RX ADMIN — Medication 650 MILLIGRAM(S): at 12:26

## 2019-08-28 RX ADMIN — Medication 3 MILLILITER(S): at 18:01

## 2019-08-28 RX ADMIN — Medication 650 MILLIGRAM(S): at 16:46

## 2019-08-28 RX ADMIN — Medication 650 MILLIGRAM(S): at 00:45

## 2019-08-28 RX ADMIN — Medication 3 MILLILITER(S): at 11:01

## 2019-08-28 RX ADMIN — LISINOPRIL 20 MILLIGRAM(S): 2.5 TABLET ORAL at 05:15

## 2019-08-28 RX ADMIN — Medication 81 MILLIGRAM(S): at 11:39

## 2019-08-28 RX ADMIN — ENOXAPARIN SODIUM 40 MILLIGRAM(S): 100 INJECTION SUBCUTANEOUS at 11:39

## 2019-08-28 RX ADMIN — Medication 50 MILLIGRAM(S): at 05:15

## 2019-08-28 NOTE — BEHAVIORAL HEALTH ASSESSMENT NOTE - SUMMARY
Anxiety secondary to concerns over acute medical issue / wanting to get better faster and return to her usual activities.   - Ativan 0.25mg PO BID PRN for anxiety

## 2019-08-28 NOTE — BEHAVIORAL HEALTH ASSESSMENT NOTE - DESCRIPTION
9/2017 - comminuted, displaced fracture of left humerus; HTN; GERD; h/o cardiac arrythmia VT, s/p AICD, h/o CVA (old right cerebellar infarct); hx of BETSY; bilateral TKR; HTN; hearing impairment (has hearing aids); DMII; TRISTA on CPAP; multiple syncopal episodes; CHF; CAD w/ hx of NSTEMI, CABG on 04/21/2016; hx of cholecystectomy; hypokalemia; hx of CHF decompensation w/ LE edema/shortness of breath; hx of hypertensive urgency; hx of constipation; hx of multiple falls

## 2019-08-28 NOTE — PROGRESS NOTE ADULT - ASSESSMENT
86 yo F with CAD, CABG, HTN, GERD pw sob/linn. Mild lower abd pain/soreness as well. Loose stools noted. Better on oxygen/diureses. Had increased water intake over the last few days.  repeat echo with LVEF 55%, grade III DD, mild-mod MR, mild MS, mod AR, mild pulm HTN.   BNP 69304 ->8900  Trop neg x 2  Chest x ray: Increased lung volume is consistent with COPD. There are mild to moderate basilar effusions left greater than right. These are somewhat increased from August 22.  Tele: sinus 60s  with c/o difficulty breathing    ·	Acute on chronic diastolic HF  ·	B/L pleural effusions   ·	CAD  ·	HTN  ·	GERD     PLAN:     -cont tele  -cont with supplemental O2/Duoneb   -cont asa/statin/lisinopril/metoprolol  -currently on Po Lasix, no significant weight  loss noted, and pt still with c/o sob, BUN 12->38->38, chest x-ray with b/l pleural effusions, will obtain Chest CT to further assess the need for possible thoracentesis   -monitor lytes/creat with diuresis  -c/o abd bloating and constipation, reported to have BMs as per documentation last night, currently on PPI, colace, and Maalox PRN  -Pt lives alone at home, and gets her meals from wheels, case management/ SW referral   -PT eval once breathing improves 84 yo F with CAD, CABG, HTN, GERD pw sob/linn. Mild lower abd pain/soreness as well. Loose stools noted. Better on oxygen/diureses. Had increased water intake over the last few days.  repeat echo with LVEF 55%, grade III DD, mild-mod MR, mild MS, mod AR, mild pulm HTN.   BNP 59394 ->8900  Trop neg x 2  Chest x ray: Increased lung volume is consistent with COPD. There are mild to moderate basilar effusions left greater than right. These are somewhat increased from August 22.  Tele: sinus 60s  with c/o difficulty breathing    ·	Acute on chronic diastolic HF  ·	B/L pleural effusions   ·	CAD  ·	HTN  ·	GERD     incomplete 84 yo F with CAD, CABG, HTN, GERD pw sob/linn. Mild lower abd pain/soreness as well. Loose stools noted. Better on oxygen/diureses. Had increased water intake over the last few days.  repeat echo with LVEF 55%, grade III DD, mild-mod MR, mild MS, mod AR, mild pulm HTN.   BNP 93243 ->8900  Trop neg x 2  Chest x ray: Increased lung volume is consistent with COPD. There are mild to moderate basilar effusions left greater than right. These are somewhat increased from August 22.  Tele: sinus 60s  with c/o difficulty breathing    ·	Acute on chronic diastolic HF  ·	B/L pleural effusions   ·	CAD  ·	HTN  ·	GERD     PLAN:     -cont tele  -daily weight  -cont with supplemental O2/Duoneb   -cont asa/statin/lisinopril/metoprolol  -currently on PO Lasix, no significant weight  loss noted, and pt still with c/o sob, BUN 12->38->38, chest x-ray with b/l pleural effusions. Zaroxolyn 5mg x 1 dose given and lasix 40mg IVP x 1 dose given today. If no increase in Cr in Am give IVP Lasix as needed.  -Chest CT small to moderate sergio pleural effusion, continue diuresis.    -monitor lytes/creat with diuresis  -Pt lives alone at home, and gets her meals from wheels, case management/  referral   -PT eval once breathing improves   -discussed plan with Cardiologist.

## 2019-08-28 NOTE — BEHAVIORAL HEALTH ASSESSMENT NOTE - NSBHSUICPROTECTFACT_PSY_A_CORE
Responsibility to family and others/Future oriented/Supportive social network or family/Identifies reasons for living/Positive therapeutic relationships

## 2019-08-28 NOTE — PROGRESS NOTE ADULT - SUBJECTIVE AND OBJECTIVE BOX
86 yo lady with DILLARD on meds. Echo is stable. BNP noted along with CT of chest.      Vital Signs Last 24 Hrs  T(C): 35.8 (28 Aug 2019 11:47), Max: 36.1 (27 Aug 2019 23:52)  T(F): 96.4 (28 Aug 2019 11:47), Max: 97 (27 Aug 2019 23:52)  HR: 64 (28 Aug 2019 11:47) (64 - 69)  BP: 171/60 (28 Aug 2019 11:47) (138/85 - 188/76)  BP(mean): --  RR: 18 (28 Aug 2019 11:47) (18 - 20)  SpO2: 97% (28 Aug 2019 11:47) (97% - 99%)                          10.4   6.72  )-----------( 256      ( 28 Aug 2019 07:25 )             32.6     MEDICATIONS  (STANDING):  ALBUTerol    0.083%. 2.5 milliGRAM(s) Nebulizer once  ALBUTerol/ipratropium for Nebulization 3 milliLiter(s) Nebulizer every 6 hours  aspirin enteric coated 81 milliGRAM(s) Oral daily  atorvastatin 20 milliGRAM(s) Oral at bedtime  docusate sodium 100 milliGRAM(s) Oral three times a day  enoxaparin Injectable 40 milliGRAM(s) SubCutaneous daily  FLUoxetine 40 milliGRAM(s) Oral daily  furosemide    Tablet 40 milliGRAM(s) Oral daily  lisinopril 20 milliGRAM(s) Oral daily  metolazone 5 milliGRAM(s) Oral once  metoprolol tartrate 50 milliGRAM(s) Oral two times a day  pantoprazole    Tablet 40 milliGRAM(s) Oral before breakfast    MEDICATIONS  (PRN):  acetaminophen   Tablet .. 650 milliGRAM(s) Oral every 6 hours PRN Mild Pain (1 - 3)  aluminum hydroxide/magnesium hydroxide/simethicone Suspension 30 milliLiter(s) Oral every 4 hours PRN Dyspepsia  zolpidem 5 milliGRAM(s) Oral at bedtime PRN Insomnia

## 2019-08-28 NOTE — BEHAVIORAL HEALTH ASSESSMENT NOTE - RISK ASSESSMENT
Chronic risk factors: age > 65 yrs; single/; chronic medical issues with some impact on overall functioning. Protective factors: female gender; medication and treatment compliant; no history of psych hospitalizations, no hx of substance use; no suicide attempts; no self-injurious behavior; no hx of aggression/violence; no legal issues; motivated for help; articulate; strong family support; access to health services.

## 2019-08-28 NOTE — BEHAVIORAL HEALTH ASSESSMENT NOTE - HPI (INCLUDE ILLNESS QUALITY, SEVERITY, DURATION, TIMING, CONTEXT, MODIFYING FACTORS, ASSOCIATED SIGNS AND SYMPTOMS)
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 patient has been evaluated by  psychiatric services before, including [facility] in 2017, BIB EMS from home for our day h/o lower chest pressure with sob. Consult called for anxiety.     Patient is hard of hearing so needs loud questions - her hearing aid broke and she was supposed to have them replaced this week. Patient is calm, cooperative, pleasant and fully engaged. She reports that she likes to garden and keep herself busy at home and being essentially bed-bound / inactive for 11 days due to her medical issues is anxiety provoking and upsetting.  Patient is also nervous about her test/ CT results; she wants "the water to be taken out if possible" from her body as she wants to recover faster. Patient otherwise endorses stable euthymic mood prior to acute medical issue; reports regular sleep / appetite / energy level / concentration. Denies any symptoms of hypomania/kd/psychosis/depression/ panic. Denies any active or passive suicidal or homicidal ideation. Names protective factors (syl; family; hope for future). Endorses medication compliance. Denies adverse medication side effects. Does not use substances/drugs.    ISTOP  02/04/2019 02/07/2019 zolpidem tartrate 5 mg tablet  30 30 Alejo Stuart MD     12/01/2018 12/02/2018 zolpidem tartrate 10 mg tablet  30 30 Alejo Stuart MD     12/01/2018 12/02/2018 tramadol hcl 50 mg tablet  90 30 Alejo Stuart MD   12/14/2018 12/14/2018 tramadol hcl 50 mg tablet  18 3 Alverto Santos     12/14/2018 12/14/2018 zolpidem tartrate 5 mg tablet  10 10 Alverto Santos

## 2019-08-28 NOTE — PROGRESS NOTE ADULT - SUBJECTIVE AND OBJECTIVE BOX
Patient is a 85y old  Female who presents with a chief complaint of SOB/DILLARD (28 Aug 2019 09:57)    PAST MEDICAL & SURGICAL HISTORY:  Coronary bypass graft mechanical complication  ST elevation myocardial infarction (STEMI), unspecified artery  BETSY (acute kidney injury)  Insomnia, unspecified type  Gastroesophageal reflux disease without esophagitis  Acute congestive heart failure, unspecified congestive heart failure type  Hypertension  S/P CABG x 1: 9 days ago  S/P TKR (total knee replacement) using cement, bilateral    INTERVAL HISTORY: Patient sitting in side of bed, O2 NC in use, reports no improvement in dyspnea.  	  MEDICATIONS:  MEDICATIONS  (STANDING):  ALBUTerol    0.083%. 2.5 milliGRAM(s) Nebulizer once  ALBUTerol/ipratropium for Nebulization 3 milliLiter(s) Nebulizer every 6 hours  aspirin enteric coated 81 milliGRAM(s) Oral daily  atorvastatin 20 milliGRAM(s) Oral at bedtime  docusate sodium 100 milliGRAM(s) Oral three times a day  enoxaparin Injectable 40 milliGRAM(s) SubCutaneous daily  FLUoxetine 40 milliGRAM(s) Oral daily  furosemide    Tablet 40 milliGRAM(s) Oral daily  lisinopril 20 milliGRAM(s) Oral daily  metolazone 5 milliGRAM(s) Oral once  metoprolol tartrate 50 milliGRAM(s) Oral two times a day  pantoprazole    Tablet 40 milliGRAM(s) Oral before breakfast    MEDICATIONS  (PRN):  acetaminophen   Tablet .. 650 milliGRAM(s) Oral every 6 hours PRN Mild Pain (1 - 3)  aluminum hydroxide/magnesium hydroxide/simethicone Suspension 30 milliLiter(s) Oral every 4 hours PRN Dyspepsia  zolpidem 5 milliGRAM(s) Oral at bedtime PRN Insomnia    Vitals:  T(F): 96.4 (08-28-19 @ 11:47), Max: 97 (08-27-19 @ 23:52)  HR: 64 (08-28-19 @ 11:47) (64 - 69)  BP: 171/60 (08-28-19 @ 11:47) (138/85 - 188/76)  RR: 18 (08-28-19 @ 11:47) (18 - 20)  SpO2: 97% (08-28-19 @ 11:47) (97% - 99%)  Wt(kg): -- 57.9 kg    08-27 @ 07:01  -  08-28 @ 07:00  --------------------------------------------------------  IN:    Oral Fluid: 860 mL  Total IN: 860 mL    OUT:    Voided: 3 mL  Total OUT: 3 mL    Total NET: 857 mL    PHYSICAL EXAM:  Neuro: Awake, responsive  CV: S1 S2 RRR, + murmur  Lungs: Crackles  GI: Soft, BS +, ND, NT  Extremities: No edema    TELEMETRY: SR 60      RADIOLOGY: < from: CT Chest No Cont (08.28.19 @ 12:29) >  FINDINGS:    LUNGS AND AIRWAYS: Patent central airways.  Bilateral dependent and   basilar atelectasis.    PLEURA: Small to moderate bilateral pleural effusions.    MEDIASTINUM AND NEWTON:Prominent lymph nodes.    VESSELS: Atherosclerotic calcifications of thoracic aorta and coronary   arteries.    HEART: Enlarged. Mitral annulus calcifications. No pericardial effusion.    CHEST WALL AND LOWER NECK: Status post sternotomy. Subcutaneous soft   tissue edema.    VISUALIZED UPPER ABDOMEN: Hiatal hernia. Renal hypodensities, possibly   cysts. Atrophic pancreas.    BONES: Spinal degenerative changes.    IMPRESSION:     Small to moderate bilateral pleural effusions.    < end of copied text >  < from: Xray Chest 1 View-PORTABLE IMMEDIATE (08.25.19 @ 11:36) >  INTERPRETATION:  AP semierect chest on August 25, 2019 at 11:17 AM.    Patient is short of breath and has diastolic dysfunction.    Gross heart enlargement and sternotomy again noted.    Increased lung volume is consistent with COPD.    There are mild to moderate basilar effusions left greater than right.   These are somewhat increased from August 22.    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    IMPRESSION: Increasing effusions particularly on the right. Other   findings as above.    < end of copied text >      DIAGNOSTIC TESTING:    [x ] Echocardiogram:   < from: TTE Echo Doppler w/o Cont (08.23.19 @ 16:30) >  Summary:   1. Left ventricular ejection fraction, by visual estimation, is 55 to   60%.   2. Normal left ventricular internal cavity size.   3. Spectral Doppler shows restrictive pattern of left ventricular  myocardial filling (Grade III diastolic dysfunction).   4. Normal right ventricular size and function.   5. The left atrium is normal in size.   6. The right atrium is normal in size.   7. There is no evidence of pericardial effusion.   8. Mild to moderate mitral annular calcification.   9. Mild to moderate mitral valve regurgitation.  10. Thickening of the anterior and posterior mitral valve leaflets.  11. Structurally normal tricuspid valve, with normal leaflet excursion.  12. Moderate aorticregurgitation.  13. Structurally normal pulmonic valve, with normal leaflet excursion.  14. Estimated pulmonary artery systolic pressure is 47.9 mmHg assuming a   right atrial pressure of 10 mmHg, which is consistent with mild pulmonary   hypertension.  15. Mitral valve mean gradient is 4.9 mmHg consistent with mild mitral   stenosis.    < end of copied text >  [ ]  Catheterization: < from: Cardiac Cath Lab - Adult (04.18.16 @ 18:01) >  VENTRICLES: Global left ventricular function was normal. EF estimated was  60 %.  CORONARY VESSELS: The coronary circulation is right dominant.  LM:   --  Distal left main: There was a 60 % stenosis.  LAD:   --  Ostial LAD: There was a 90 % stenosis.  --  Mid LAD: There was a 90 % stenosis.  CX:   --  Proximal circumflex: There was a 90 % stenosis.  RCA:   --  Mid RCA: There was a 100 % stenosis.    < end of copied text >    LABS:	 	    28 Aug 2019 07:25    129    |  91     |  29     ----------------------------<  111    4.0     |  31     |  0.76   27 Aug 2019 07:29    132    |  95     |  38     ----------------------------<  91     4.6     |  27     |  0.81     Ca    8.5        28 Aug 2019 07:25                       10.4   6.72  )-----------( 256      ( 28 Aug 2019 07:25 )             32.6   proBNP: Serum Pro-Brain Natriuretic Peptide: 8940 pg/mL (08-27 @ 07:29)

## 2019-08-28 NOTE — H&P ADULT - NSHPREVIEWOFSYSTEMS_GEN_ALL_CORE
Vital Signs Last 24 Hrs  T(C): 36.1 (28 Aug 2019 05:27), Max: 36.2 (27 Aug 2019 11:20)  T(F): 97 (28 Aug 2019 05:27), Max: 97.2 (27 Aug 2019 11:20)  HR: 68 (28 Aug 2019 05:27) (60 - 69)  BP: 188/76 (28 Aug 2019 05:27) (138/85 - 188/76)  BP(mean): --  RR: 20 (28 Aug 2019 05:27) (18 - 20)  SpO2: 97% (28 Aug 2019 05:27) (97% - 100%)                          10.4   6.72  )-----------( 256      ( 28 Aug 2019 07:25 )             32.6       08-28    129<L>  |  91<L>  |  29<H>  ----------------------------<  111<H>  4.0   |  31  |  0.76    Ca    8.5      28 Aug 2019 07:25    MEDICATIONS  (STANDING):  ALBUTerol    0.083%. 2.5 milliGRAM(s) Nebulizer once  ALBUTerol/ipratropium for Nebulization 3 milliLiter(s) Nebulizer every 6 hours  aspirin enteric coated 81 milliGRAM(s) Oral daily  atorvastatin 20 milliGRAM(s) Oral at bedtime  docusate sodium 100 milliGRAM(s) Oral three times a day  enoxaparin Injectable 40 milliGRAM(s) SubCutaneous daily  FLUoxetine 40 milliGRAM(s) Oral daily  furosemide    Tablet 40 milliGRAM(s) Oral daily  lisinopril 20 milliGRAM(s) Oral daily  metoprolol tartrate 50 milliGRAM(s) Oral two times a day  pantoprazole    Tablet 40 milliGRAM(s) Oral before breakfast    MEDICATIONS  (PRN):  acetaminophen   Tablet .. 650 milliGRAM(s) Oral every 6 hours PRN Mild Pain (1 - 3)  aluminum hydroxide/magnesium hydroxide/simethicone Suspension 30 milliLiter(s) Oral every 4 hours PRN Dyspepsia  zolpidem 5 milliGRAM(s) Oral at bedtime PRN Insomnia

## 2019-08-29 LAB
ANION GAP SERPL CALC-SCNC: 8 MMOL/L — SIGNIFICANT CHANGE UP (ref 5–17)
APPEARANCE UR: CLEAR — SIGNIFICANT CHANGE UP
BACTERIA # UR AUTO: ABNORMAL
BILIRUB UR-MCNC: NEGATIVE — SIGNIFICANT CHANGE UP
BUN SERPL-MCNC: 25 MG/DL — HIGH (ref 7–23)
CALCIUM SERPL-MCNC: 8.7 MG/DL — SIGNIFICANT CHANGE UP (ref 8.5–10.1)
CHLORIDE SERPL-SCNC: 88 MMOL/L — LOW (ref 96–108)
CO2 SERPL-SCNC: 31 MMOL/L — SIGNIFICANT CHANGE UP (ref 22–31)
COLOR SPEC: YELLOW — SIGNIFICANT CHANGE UP
CREAT SERPL-MCNC: 0.74 MG/DL — SIGNIFICANT CHANGE UP (ref 0.5–1.3)
DIFF PNL FLD: ABNORMAL
EPI CELLS # UR: SIGNIFICANT CHANGE UP
GLUCOSE SERPL-MCNC: 98 MG/DL — SIGNIFICANT CHANGE UP (ref 70–99)
GLUCOSE UR QL: NEGATIVE MG/DL — SIGNIFICANT CHANGE UP
HCT VFR BLD CALC: 34.8 % — SIGNIFICANT CHANGE UP (ref 34.5–45)
HGB BLD-MCNC: 11.3 G/DL — LOW (ref 11.5–15.5)
KETONES UR-MCNC: NEGATIVE — SIGNIFICANT CHANGE UP
LEUKOCYTE ESTERASE UR-ACNC: NEGATIVE — SIGNIFICANT CHANGE UP
MAGNESIUM SERPL-MCNC: 2.1 MG/DL — SIGNIFICANT CHANGE UP (ref 1.6–2.6)
MCHC RBC-ENTMCNC: 26 PG — LOW (ref 27–34)
MCHC RBC-ENTMCNC: 32.5 GM/DL — SIGNIFICANT CHANGE UP (ref 32–36)
MCV RBC AUTO: 80.2 FL — SIGNIFICANT CHANGE UP (ref 80–100)
NITRITE UR-MCNC: NEGATIVE — SIGNIFICANT CHANGE UP
NRBC # BLD: 0 /100 WBCS — SIGNIFICANT CHANGE UP (ref 0–0)
PH UR: 6.5 — SIGNIFICANT CHANGE UP (ref 5–8)
PHOSPHATE SERPL-MCNC: 3.1 MG/DL — SIGNIFICANT CHANGE UP (ref 2.5–4.5)
PLATELET # BLD AUTO: 218 K/UL — SIGNIFICANT CHANGE UP (ref 150–400)
POTASSIUM SERPL-MCNC: 3.6 MMOL/L — SIGNIFICANT CHANGE UP (ref 3.5–5.3)
POTASSIUM SERPL-SCNC: 3.6 MMOL/L — SIGNIFICANT CHANGE UP (ref 3.5–5.3)
PROT UR-MCNC: NEGATIVE MG/DL — SIGNIFICANT CHANGE UP
RBC # BLD: 4.34 M/UL — SIGNIFICANT CHANGE UP (ref 3.8–5.2)
RBC # FLD: 16.2 % — HIGH (ref 10.3–14.5)
RBC CASTS # UR COMP ASSIST: SIGNIFICANT CHANGE UP /HPF (ref 0–4)
SODIUM SERPL-SCNC: 127 MMOL/L — LOW (ref 135–145)
SP GR SPEC: 1.01 — SIGNIFICANT CHANGE UP (ref 1.01–1.02)
UROBILINOGEN FLD QL: NEGATIVE MG/DL — SIGNIFICANT CHANGE UP
WBC # BLD: 5.65 K/UL — SIGNIFICANT CHANGE UP (ref 3.8–10.5)
WBC # FLD AUTO: 5.65 K/UL — SIGNIFICANT CHANGE UP (ref 3.8–10.5)
WBC UR QL: SIGNIFICANT CHANGE UP

## 2019-08-29 PROCEDURE — 99232 SBSQ HOSP IP/OBS MODERATE 35: CPT

## 2019-08-29 PROCEDURE — 99233 SBSQ HOSP IP/OBS HIGH 50: CPT

## 2019-08-29 RX ORDER — FUROSEMIDE 40 MG
40 TABLET ORAL
Refills: 0 | Status: DISCONTINUED | OUTPATIENT
Start: 2019-08-29 | End: 2019-08-31

## 2019-08-29 RX ADMIN — Medication 100 MILLIGRAM(S): at 21:34

## 2019-08-29 RX ADMIN — Medication 100 MILLIGRAM(S): at 05:56

## 2019-08-29 RX ADMIN — Medication 40 MILLIGRAM(S): at 11:24

## 2019-08-29 RX ADMIN — Medication 650 MILLIGRAM(S): at 21:34

## 2019-08-29 RX ADMIN — ENOXAPARIN SODIUM 40 MILLIGRAM(S): 100 INJECTION SUBCUTANEOUS at 11:23

## 2019-08-29 RX ADMIN — Medication 40 MILLIGRAM(S): at 05:57

## 2019-08-29 RX ADMIN — Medication 3 MILLILITER(S): at 05:29

## 2019-08-29 RX ADMIN — Medication 30 MILLILITER(S): at 11:27

## 2019-08-29 RX ADMIN — ATORVASTATIN CALCIUM 20 MILLIGRAM(S): 80 TABLET, FILM COATED ORAL at 21:34

## 2019-08-29 RX ADMIN — Medication 81 MILLIGRAM(S): at 11:23

## 2019-08-29 RX ADMIN — PANTOPRAZOLE SODIUM 40 MILLIGRAM(S): 20 TABLET, DELAYED RELEASE ORAL at 05:58

## 2019-08-29 RX ADMIN — Medication 50 MILLIGRAM(S): at 05:57

## 2019-08-29 RX ADMIN — Medication 100 MILLIGRAM(S): at 13:10

## 2019-08-29 RX ADMIN — Medication 650 MILLIGRAM(S): at 22:35

## 2019-08-29 RX ADMIN — Medication 3 MILLILITER(S): at 11:04

## 2019-08-29 RX ADMIN — Medication 50 MILLIGRAM(S): at 16:56

## 2019-08-29 RX ADMIN — Medication 40 MILLIGRAM(S): at 16:58

## 2019-08-29 RX ADMIN — Medication 3 MILLILITER(S): at 23:33

## 2019-08-29 RX ADMIN — LISINOPRIL 20 MILLIGRAM(S): 2.5 TABLET ORAL at 05:57

## 2019-08-29 RX ADMIN — Medication 3 MILLILITER(S): at 17:14

## 2019-08-29 RX ADMIN — ZOLPIDEM TARTRATE 5 MILLIGRAM(S): 10 TABLET ORAL at 21:34

## 2019-08-29 NOTE — CONSULT NOTE ADULT - ASSESSMENT
85 white female with a history of HTN, CAD, CHF, COPD now admitted with increasing SOB and DILLARD with leg edema.   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   Advised on the importance of fluid restriction and increase lasix to 40 mg twice  a day. Check UA and also uric acid levels. Will follow closely.

## 2019-08-29 NOTE — CONSULT NOTE ADULT - SUBJECTIVE AND OBJECTIVE BOX
Nephrology Consultation: MD ALEYDA Lee CATHERINE  85y    HPI:  86 yo lady has been slowly noting progressive DILLARD and SOB. She has a known DCM and COPD. She is now admitted with decompensated combined CHF.   She is now found to have hyponatremia. She has been fluid non compliant. She has been on lasix at home along with ACEI.       PAST MEDICAL & SURGICAL HISTORY:  Coronary bypass graft mechanical complication  ST elevation myocardial infarction (STEMI), unspecified artery  BETSY (acute kidney injury)  Insomnia, unspecified type  Gastroesophageal reflux disease without esophagitis  Acute congestive heart failure, unspecified congestive heart failure type  Hypertension  S/P CABG x 1: 9 days ago  S/P TKR (total knee replacement) using cement, bilateral      Allergies    Talwin (Nausea)    Intolerances    lactose (Headache; Vomiting)  Milk (Vomiting)      Home Medications:  acetaminophen 325 mg oral tablet: 2 tab(s) orally every 6 hours, As needed, Mild Pain (1 - 3) (10 Dec 2018 23:36)  albuterol 90 mcg/inh inhalation aerosol: 1 puff(s) inhaled every 4 hours, As needed, Shortness of Breath and/or Wheezing (10 Dec 2018 23:36)  Ambien 5 mg oral tablet: 1 tab(s) orally once a day (at bedtime), As Needed (10 Dec 2018 23:36)  aspirin 81 mg oral delayed release tablet: 1 tab(s) orally once a day (10 Dec 2018 23:36)  atorvastatin 20 mg oral tablet: 1 tab(s) orally once a day (at bedtime) (10 Dec 2018 23:36)  FLUoxetine 40 mg oral capsule: 1 cap(s) orally once a day (10 Dec 2018 23:36)  furosemide 40 mg oral tablet: 1 tab(s) orally once a day (10 Dec 2018 23:36)  Innerclean oral tablet: 2 tab(s) orally once a day (at bedtime), As needed, Constipation (10 Dec 2018 23:36)  lisinopril 20 mg oral tablet: 1 tab(s) orally once a day (25 Sep 2017 13:48)  metoprolol tartrate 50 mg oral tablet: 1 tab(s) orally 2 times a day (25 Sep 2017 13:48)  pantoprazole 40 mg oral delayed release tablet: 1 tab(s) orally once a day (before a meal) (25 Sep 2017 13:48)  Symbicort 80 mcg-4.5 mcg/inh inhalation aerosol: 2 puff(s) inhaled 2 times a day (23 Aug 2019 09:59)  traMADol 50 mg oral tablet: 1 tab(s) orally every 4 hours, As needed, Mild Pain (1 - 3) (12 Nov 2017 11:38)        FAMILY HISTORY:  No pertinent family history in first degree relatives      SOCIAL HISTORY:    REVIEW OF SYSTEMS:    Constitutional: No fever, weight loss or fatigue  Eyes: No eye pain, visual disturbances, or discharge  ENT:  No difficulty hearing, tinnitus, vertigo; No sinus or throat pain  Neck: No pain or stiffness  Breasts: No pain, masses or nipple discharge  Respiratory: No cough, wheezing, chills or hemoptysis  Cardiovascular: No chest pain, palpitations, shortness of breath, dizziness or leg swelling  Gastrointestinal: No abdominal or epigastric pain. No nausea, vomiting or hematemesis; No diarrhea or constipation. No melena or hematochezia.  Genitourinary: No dysuria, frequency, hematuria or incontinence  Rectal: No pain, hemorrhoids or incontinence  Neurological: No headaches, memory loss, loss of strength, numbness or tremors  Skin: No itching, burning, rashes or lesions   Lymph Nodes: No enlarged glands  Endocrine: No heat or cold intolerance; No hair loss  Musculoskeletal: No joint pain or swelling; No muscle, back or extremity pain  Psychiatric: No depression, anxiety, mood swings or difficulty sleeping  Heme/Lymph: No easy bruising or bleeding gums  Allergy and Immunologic: No hives or eczema    current medications:    acetaminophen   Tablet .. 650 milliGRAM(s) Oral every 6 hours PRN  ALBUTerol    0.083%. 2.5 milliGRAM(s) Nebulizer once  ALBUTerol/ipratropium for Nebulization 3 milliLiter(s) Nebulizer every 6 hours  aluminum hydroxide/magnesium hydroxide/simethicone Suspension 30 milliLiter(s) Oral every 4 hours PRN  aspirin enteric coated 81 milliGRAM(s) Oral daily  atorvastatin 20 milliGRAM(s) Oral at bedtime  docusate sodium 100 milliGRAM(s) Oral three times a day  enoxaparin Injectable 40 milliGRAM(s) SubCutaneous daily  FLUoxetine 40 milliGRAM(s) Oral daily  furosemide    Tablet 40 milliGRAM(s) Oral daily  lisinopril 20 milliGRAM(s) Oral daily  LORazepam     Tablet 0.25 milliGRAM(s) Oral two times a day PRN  metoprolol tartrate 50 milliGRAM(s) Oral two times a day  pantoprazole    Tablet 40 milliGRAM(s) Oral before breakfast  zolpidem 5 milliGRAM(s) Oral at bedtime PRN      Vital Signs Last 24 Hrs  T(C): 36.4 (29 Aug 2019 10:38), Max: 36.8 (28 Aug 2019 17:00)  T(F): 97.6 (29 Aug 2019 10:38), Max: 98.3 (28 Aug 2019 17:00)  HR: 86 (29 Aug 2019 11:38) (64 - 95)  BP: 146/68 (29 Aug 2019 10:38) (146/68 - 160/78)  BP(mean): --  RR: 18 (29 Aug 2019 10:38) (18 - 18)  SpO2: 96% (29 Aug 2019 11:38) (95% - 100%)    PHYSICAL EXAM:    Constitutional: NAD, well-groomed, well-developed  HEENT: PERRLA, EOMI, Normal Hearing, MMM  Neck: No LAD, No JVD  Back: Normal spine flexure, No CVA tenderness  Respiratory: CTAB/L   Cardiovascular: S1 and S2, RRR, no M/G/R  Gastrointestinal: BS+, soft, NT/ND  Extremities: one plus peripheral edema  Vascular: 2+ peripheral pulses  Neurological: A/O x 3, no focal deficits  Skin: No rashes      LABS:                        11.3   5.65  )-----------( 218      ( 29 Aug 2019 07:12 )             34.8     08-29    127<L>  |  88<L>  |  25<H>  ----------------------------<  98  3.6   |  31  |  0.74    Ca    8.7      29 Aug 2019 07:12  Phos  3.1     08-29  Mg     2.1     08-29          Creatinine Trend: 0.74<--, 0.76<--, 0.81<--, 1.04<--, 0.70<--, 0.79<--    MICROBIOLOGY:  RECENT CULTURES:        RADIOLOGY & ADDITIONAL STUDIES:    < from: CT Chest No Cont (08.28.19 @ 12:29) >  EXAM:  CT CHEST                            PROCEDURE DATE:  08/28/2019          INTERPRETATION:  CLINICAL INFORMATION: Shortness of breath, pleural   effusion    COMPARISON: 5/20/2016    PROCEDURE:   CT of the Chest was performed without intravenous contrast.  Sagittal and coronal reformats were performed.      FINDINGS:    LUNGS AND AIRWAYS: Patent central airways.  Bilateral dependent and   basilar atelectasis.    PLEURA: Small to moderate bilateral pleural effusions.    MEDIASTINUM AND NEWTON:Prominent lymph nodes.    VESSELS: Atherosclerotic calcifications of thoracic aorta and coronary   arteries.    HEART: Enlarged. Mitral annulus calcifications. No pericardial effusion.    CHEST WALL AND LOWER NECK: Status post sternotomy. Subcutaneous soft   tissue edema.    VISUALIZED UPPER ABDOMEN: Hiatal hernia. Renal hypodensities, possibly   cysts. Atrophic pancreas.    BONES: Spinal degenerative changes.    IMPRESSION:     Small to moderate bilateral pleural effusions.

## 2019-08-29 NOTE — PROGRESS NOTE BEHAVIORAL HEALTH - SUMMARY
- psychiatrically cleared for discharge home   - if Patient does not need Ativan PRN during hospital stay, no need for a prescription for it for home. She can always get it if needed from PMD  - continue Prozac 40mg PO qd - psychiatrically cleared for discharge home   - if Patient does not need Ativan PRN during hospital stay, no need for a prescription for it for home. She can always get it if needed from PMD  - continue Prozac 40mg PO qd: While it is not preferred 1st line antidepressant to be used in her age group, she is doing well on it without adverse medication side effects so can continue. Would not further increase dose though given geriatric dosing need in this case

## 2019-08-29 NOTE — PROGRESS NOTE ADULT - SUBJECTIVE AND OBJECTIVE BOX
84 yo lady feeling better. Laying flat on the bed.     Vital Signs Last 24 Hrs  T(C): 36.6 (29 Aug 2019 05:27), Max: 36.8 (28 Aug 2019 17:00)  T(F): 97.8 (29 Aug 2019 05:27), Max: 98.3 (28 Aug 2019 17:00)  HR: 81 (29 Aug 2019 06:23) (64 - 95)  BP: 146/72 (29 Aug 2019 05:27) (146/72 - 171/60)  BP(mean): --  RR: 18 (29 Aug 2019 05:27) (18 - 18)  SpO2: 95% (29 Aug 2019 06:23) (95% - 99%)                            11.3   5.65  )-----------( 218      ( 29 Aug 2019 07:12 )             34.8       08-29    127<L>  |  88<L>  |  25<H>  ----------------------------<  98  3.6   |  31  |  0.74    Ca    8.7      29 Aug 2019 07:12  Phos  3.1     08-29  Mg     2.1     08-29

## 2019-08-29 NOTE — PROGRESS NOTE ADULT - ASSESSMENT
84 yo F with CAD, CABG, HTN, GERD pw sob/linn. Mild lower abd pain/soreness as well. Loose stools noted. Better on oxygen/diureses. Had increased water intake over the last few days.  repeat echo with LVEF 55%, grade III DD, mild-mod MR, mild MS, mod AR, mild pulm HTN.   BNP 55304 ->8900  Trop neg x 2  Chest x ray: Increased lung volume is consistent with COPD. There are mild to moderate basilar effusions left greater than right. These are somewhat increased from August 22.  Tele: sinus 60s  with c/o difficulty breathing    ·	Acute on chronic diastolic HF  ·	B/L pleural effusions   ·	CAD  ·	HTN  ·	GERD  ·	Hyponatremia 127 today      PLAN:    -Na 127 <129 <132; Renal ,Dr Heaton consulted.    -cont tele - patient had episode of Afib x 2 hours overnight possible due to hyponatremia??    -daily weight  -cont with supplemental O2/Duoneb   -cont asa/statin/lisinopril/metoprolol  -currently on PO Lasix, no significant weight  loss noted or increase in Cr.  May give IVP Lasix as needed. Patient reports improvement in dyspnea after IVP Lasix and Zaroxolyn yesterday.  -Chest CT small to moderate sergio pleural effusion, continue diuresis.    -monitor lytes/creat with diuresis  -Pt lives alone at home, and gets her meals from wheels, case management/ SW referral   -PT eval once breathing improves    - 84 yo F with CAD, CABG, HTN, GERD pw sob/linn. Mild lower abd pain/soreness as well. Loose stools noted. Better on oxygen/diureses. Had increased water intake over the last few days.  repeat echo with LVEF 55%, grade III DD, mild-mod MR, mild MS, mod AR, mild pulm HTN.   BNP 85667 ->8900  Trop neg x 2  Chest x ray: Increased lung volume is consistent with COPD. There are mild to moderate basilar effusions left greater than right. These are somewhat increased from August 22.  Tele: sinus 60s  with c/o difficulty breathing    ·	Acute on chronic diastolic HF  ·	B/L pleural effusions   ·	CAD  ·	HTN  ·	GERD  ·	Hyponatremia 127 today      PLAN:    -Na 127 <129 <132; Renal ,Dr Heaton consulted.    -cont tele - patient had episode of Afib x 2 hours overnight possible due to hyponatremia??  -daily weight  -cont with supplemental O2/Duoneb   -cont asa/statin/lisinopril/metoprolol  -currently on PO Lasix, no significant weight  loss noted or increase in Cr.  May give IVP Lasix as needed. Patient reports improvement in dyspnea after IVP Lasix and Zaroxolyn yesterday.  -Chest CT small to moderate sergio pleural effusion, continue diuresis.    -monitor lytes/creat with diuresis  -Pt lives alone at home, and gets her meals from wheels, case management/ SW referral   -PT eval once breathing improves   -Patient may benefit from PFT after DC     - 86 yo F with CAD, CABG, HTN, GERD pw sob/linn. Mild lower abd pain/soreness as well. Loose stools noted. Better on oxygen/diureses. Had increased water intake over the last few days.  repeat echo with LVEF 55%, grade III DD, mild-mod MR, mild MS, mod AR, mild pulm HTN.   BNP 96878 ->8900  Trop neg x 2  Chest x ray: Increased lung volume is consistent with COPD. There are mild to moderate basilar effusions left greater than right. These are somewhat increased from August 22.  Tele: sinus 60s  with c/o difficulty breathing    ·	Acute on chronic diastolic HF, EF 55-60%, Gr III DD  ·	B/L pleural effusions   ·	CAD  ·	HTN  ·	GERD  ·	Hyponatremia 127 today      PLAN:    -Na 127 <129 <132; Renal ,Dr Heaton consulted.    -cont tele - patient had episode of Afib x 2 hours overnight possible due to hyponatremia??  -daily weight  -cont with supplemental O2/Duoneb   -cont asa/statin/lisinopril/metoprolol  -currently on PO Lasix, no significant weight  loss noted or increase in Cr.  May give IVP Lasix as needed. Patient reports improvement in dyspnea after IVP Lasix and Zaroxolyn yesterday.  -Chest CT small to moderate sergio pleural effusion, continue diuresis.    -monitor lytes/creat with diuresis  -Pt lives alone at home, and gets her meals from wheels, case management/ SW referral   -PT eval once breathing improves   -Patient may benefit from PFT after DC     -

## 2019-08-29 NOTE — PROGRESS NOTE ADULT - SUBJECTIVE AND OBJECTIVE BOX
Patient is a 85y old  Female who presents with a chief complaint of SOB/DILLARD (29 Aug 2019 08:58)    PAST MEDICAL & SURGICAL HISTORY:  Coronary bypass graft mechanical complication  ST elevation myocardial infarction (STEMI), unspecified artery  BETSY (acute kidney injury)  Insomnia, unspecified type  Gastroesophageal reflux disease without esophagitis  Acute congestive heart failure, unspecified congestive heart failure type  Hypertension  S/P CABG x 1: 9 days ago  S/P TKR (total knee replacement) using cement, bilateral    INTERVAL HISTORY: Patient sitting in chair reporting improvement in dyspnea today. Denies chest pain, palpitations, dizziness or cough.  	  MEDICATIONS:  MEDICATIONS  (STANDING):  ALBUTerol    0.083%. 2.5 milliGRAM(s) Nebulizer once  ALBUTerol/ipratropium for Nebulization 3 milliLiter(s) Nebulizer every 6 hours  aspirin enteric coated 81 milliGRAM(s) Oral daily  atorvastatin 20 milliGRAM(s) Oral at bedtime  docusate sodium 100 milliGRAM(s) Oral three times a day  enoxaparin Injectable 40 milliGRAM(s) SubCutaneous daily  FLUoxetine 40 milliGRAM(s) Oral daily  furosemide    Tablet 40 milliGRAM(s) Oral daily  lisinopril 20 milliGRAM(s) Oral daily  metoprolol tartrate 50 milliGRAM(s) Oral two times a day  pantoprazole    Tablet 40 milliGRAM(s) Oral before breakfast    MEDICATIONS  (PRN):  acetaminophen   Tablet .. 650 milliGRAM(s) Oral every 6 hours PRN Mild Pain (1 - 3)  aluminum hydroxide/magnesium hydroxide/simethicone Suspension 30 milliLiter(s) Oral every 4 hours PRN Dyspepsia  LORazepam     Tablet 0.25 milliGRAM(s) Oral two times a day PRN Anxiety  zolpidem 5 milliGRAM(s) Oral at bedtime PRN Insomnia      Vitals:  T(F): 97.6 (08-29-19 @ 10:38), Max: 98.3 (08-28-19 @ 17:00)  HR: 86 (08-29-19 @ 11:38) (64 - 95)  BP: 146/68 (08-29-19 @ 10:38) (146/68 - 160/78)  RR: 18 (08-29-19 @ 10:38) (18 - 18)  SpO2: 96% (08-29-19 @ 11:38) (95% - 100%)  Wt(kg): --58 kg???    08-28 @ 07:01  -  08-29 @ 07:00  --------------------------------------------------------  IN:    Oral Fluid: 600 mL  Total IN: 600 mL    OUT:    Voided: 1410 mL  Total OUT: 1410 mL    Total NET: -810 mL    PHYSICAL EXAM:  Neuro: Awake, responsive  CV: S1 S2 RRR, + murmur  Lungs: CTABL  GI: Soft, BS +, ND, NT  Extremities: No edema    TELEMETRY: SR/ had episode of afib x 2 hours during night.  	    RADIOLOGY: < from: CT Chest No Cont (08.28.19 @ 12:29) >  FINDINGS:    LUNGS AND AIRWAYS: Patent central airways.  Bilateral dependent and   basilar atelectasis.    PLEURA: Small to moderate bilateral pleural effusions.    MEDIASTINUM AND NEWTON:Prominent lymph nodes.    VESSELS: Atherosclerotic calcifications of thoracic aorta and coronary   arteries.    HEART: Enlarged. Mitral annulus calcifications. No pericardial effusion.    CHEST WALL AND LOWER NECK: Status post sternotomy. Subcutaneous soft   tissue edema.    VISUALIZED UPPER ABDOMEN: Hiatal hernia. Renal hypodensities, possibly   cysts. Atrophic pancreas.    BONES: Spinal degenerative changes.    IMPRESSION:     Small to moderate bilateral pleural effusions.      < end of copied text >    DIAGNOSTIC TESTING:    [ x] Echocardiogram: < from: TTE Echo Doppler w/o Cont (08.23.19 @ 16:30) >  Summary:   1. Left ventricular ejection fraction, by visual estimation, is 55 to   60%.   2. Normal left ventricular internal cavity size.   3. Spectral Doppler shows restrictive pattern of left ventricular  myocardial filling (Grade III diastolic dysfunction).   4. Normal right ventricular size and function.   5. The left atrium is normal in size.   6. The right atrium is normal in size.   7. There is no evidence of pericardial effusion.   8. Mild to moderate mitral annular calcification.   9. Mild to moderate mitral valve regurgitation.  10. Thickening of the anterior and posterior mitral valve leaflets.  11. Structurally normal tricuspid valve, with normal leaflet excursion.  12. Moderate aorticregurgitation.  13. Structurally normal pulmonic valve, with normal leaflet excursion.  14. Estimated pulmonary artery systolic pressure is 47.9 mmHg assuming a   right atrial pressure of 10 mmHg, which is consistent with mild pulmonary   hypertension.  15. Mitral valve mean gradient is 4.9 mmHg consistent with mild mitral   stenosis.    < end of copied text >    [x ]  Catheterization: < from: Cardiac Cath Lab - Adult (04.18.16 @ 18:01) >  CORONARY VESSELS: The coronary circulation is right dominant.  LM:   --  Distal left main: There was a 60 % stenosis.  LAD:   --  Ostial LAD: There was a 90 % stenosis.  --  Mid LAD: There was a 90 % stenosis.  CX:   --  Proximal circumflex: There was a 90 % stenosis.  RCA:   --  Mid RCA: There was a 100 % stenosis.    < end of copied text >    LABS:	 	    29 Aug 2019 07:12    127    |  88     |  25     ----------------------------<  98     3.6     |  31     |  0.74   28 Aug 2019 07:25    129    |  91     |  29     ----------------------------<  111    4.0     |  31     |  0.76   27 Aug 2019 07:29    132    |  95     |  38     ----------------------------<  91     4.6     |  27     |  0.81     Ca    8.7        29 Aug 2019 07:12  Phos  3.1       29 Aug 2019 07:12  Mg     2.1       29 Aug 2019 07:12                          11.3   5.65  )-----------( 218      ( 29 Aug 2019 07:12 )             34.8 ,                       10.4   6.72  )-----------( 256      ( 28 Aug 2019 07:25 )             32.6   proBNP: Serum Pro-Brain Natriuretic Peptide: 8940 pg/mL (08-27 @ 07:29) Patient is a 85y old  Female who presents with a chief complaint of SOB/DILLARD (29 Aug 2019 08:58)    PAST MEDICAL & SURGICAL HISTORY:  Coronary bypass graft mechanical complication  ST elevation myocardial infarction (STEMI), unspecified artery  BETSY (acute kidney injury)  Insomnia, unspecified type  Gastroesophageal reflux disease without esophagitis  Acute congestive heart failure, unspecified congestive heart failure type  Hypertension  S/P CABG x 1: 9 days ago  S/P TKR (total knee replacement) using cement, bilateral    INTERVAL HISTORY: Patient sitting in chair reporting improvement in dyspnea today. Denies chest pain, palpitations, dizziness or cough.  	  MEDICATIONS:  MEDICATIONS  (STANDING):  ALBUTerol    0.083%. 2.5 milliGRAM(s) Nebulizer once  ALBUTerol/ipratropium for Nebulization 3 milliLiter(s) Nebulizer every 6 hours  aspirin enteric coated 81 milliGRAM(s) Oral daily  atorvastatin 20 milliGRAM(s) Oral at bedtime  docusate sodium 100 milliGRAM(s) Oral three times a day  enoxaparin Injectable 40 milliGRAM(s) SubCutaneous daily  FLUoxetine 40 milliGRAM(s) Oral daily  furosemide    Tablet 40 milliGRAM(s) Oral daily  lisinopril 20 milliGRAM(s) Oral daily  metoprolol tartrate 50 milliGRAM(s) Oral two times a day  pantoprazole    Tablet 40 milliGRAM(s) Oral before breakfast    MEDICATIONS  (PRN):  acetaminophen   Tablet .. 650 milliGRAM(s) Oral every 6 hours PRN Mild Pain (1 - 3)  aluminum hydroxide/magnesium hydroxide/simethicone Suspension 30 milliLiter(s) Oral every 4 hours PRN Dyspepsia  LORazepam     Tablet 0.25 milliGRAM(s) Oral two times a day PRN Anxiety  zolpidem 5 milliGRAM(s) Oral at bedtime PRN Insomnia      Vitals:  T(F): 97.6 (08-29-19 @ 10:38), Max: 98.3 (08-28-19 @ 17:00)  HR: 86 (08-29-19 @ 11:38) (64 - 95)  BP: 146/68 (08-29-19 @ 10:38) (146/68 - 160/78)  RR: 18 (08-29-19 @ 10:38) (18 - 18)  SpO2: 96% (08-29-19 @ 11:38) (95% - 100%)  Wt(kg): --58 kg???    08-28 @ 07:01  -  08-29 @ 07:00  --------------------------------------------------------  IN:    Oral Fluid: 600 mL  Total IN: 600 mL    OUT:    Voided: 1410 mL  Total OUT: 1410 mL    Total NET: -810 mL    PHYSICAL EXAM:  Neuro: Awake, responsive  CV: S1 S2 RRR, + murmur  Lungs: CTABL  GI: Soft, BS +, ND, NT  Extremities: trace LE edema    TELEMETRY: SR/ had episode of afib x 2 hours during night.  	    RADIOLOGY: < from: CT Chest No Cont (08.28.19 @ 12:29) >  FINDINGS:    LUNGS AND AIRWAYS: Patent central airways.  Bilateral dependent and   basilar atelectasis.    PLEURA: Small to moderate bilateral pleural effusions.    MEDIASTINUM AND NEWTON:Prominent lymph nodes.    VESSELS: Atherosclerotic calcifications of thoracic aorta and coronary   arteries.    HEART: Enlarged. Mitral annulus calcifications. No pericardial effusion.    CHEST WALL AND LOWER NECK: Status post sternotomy. Subcutaneous soft   tissue edema.    VISUALIZED UPPER ABDOMEN: Hiatal hernia. Renal hypodensities, possibly   cysts. Atrophic pancreas.    BONES: Spinal degenerative changes.    IMPRESSION:     Small to moderate bilateral pleural effusions.      < end of copied text >    DIAGNOSTIC TESTING:    [ x] Echocardiogram: < from: TTE Echo Doppler w/o Cont (08.23.19 @ 16:30) >  Summary:   1. Left ventricular ejection fraction, by visual estimation, is 55 to   60%.   2. Normal left ventricular internal cavity size.   3. Spectral Doppler shows restrictive pattern of left ventricular  myocardial filling (Grade III diastolic dysfunction).   4. Normal right ventricular size and function.   5. The left atrium is normal in size.   6. The right atrium is normal in size.   7. There is no evidence of pericardial effusion.   8. Mild to moderate mitral annular calcification.   9. Mild to moderate mitral valve regurgitation.  10. Thickening of the anterior and posterior mitral valve leaflets.  11. Structurally normal tricuspid valve, with normal leaflet excursion.  12. Moderate aorticregurgitation.  13. Structurally normal pulmonic valve, with normal leaflet excursion.  14. Estimated pulmonary artery systolic pressure is 47.9 mmHg assuming a   right atrial pressure of 10 mmHg, which is consistent with mild pulmonary   hypertension.  15. Mitral valve mean gradient is 4.9 mmHg consistent with mild mitral   stenosis.    < end of copied text >    [x ]  Catheterization: < from: Cardiac Cath Lab - Adult (04.18.16 @ 18:01) >  CORONARY VESSELS: The coronary circulation is right dominant.  LM:   --  Distal left main: There was a 60 % stenosis.  LAD:   --  Ostial LAD: There was a 90 % stenosis.  --  Mid LAD: There was a 90 % stenosis.  CX:   --  Proximal circumflex: There was a 90 % stenosis.  RCA:   --  Mid RCA: There was a 100 % stenosis.    < end of copied text >    LABS:	 	    29 Aug 2019 07:12    127    |  88     |  25     ----------------------------<  98     3.6     |  31     |  0.74   28 Aug 2019 07:25    129    |  91     |  29     ----------------------------<  111    4.0     |  31     |  0.76   27 Aug 2019 07:29    132    |  95     |  38     ----------------------------<  91     4.6     |  27     |  0.81     Ca    8.7        29 Aug 2019 07:12  Phos  3.1       29 Aug 2019 07:12  Mg     2.1       29 Aug 2019 07:12                          11.3   5.65  )-----------( 218      ( 29 Aug 2019 07:12 )             34.8 ,                       10.4   6.72  )-----------( 256      ( 28 Aug 2019 07:25 )             32.6   proBNP: Serum Pro-Brain Natriuretic Peptide: 8940 pg/mL (08-27 @ 07:29)

## 2019-08-29 NOTE — PROGRESS NOTE BEHAVIORAL HEALTH - NSBHFUPINTERVALHXFT_PSY_A_CORE
No significant interval events. Patient has not needed any PRN Ativan. She slept ok with her Ambien which she has used for a long time; denies adverse side effects. Same psychiatric clinical presentation. No significant interval events. Patient has not needed any PRN Ativan. She slept ok with her Ambien which she has used for a long time; denies adverse side effects. Same psychiatric clinical presentation - engages, hopeful to get better, was excited for lunch arrival.

## 2019-08-29 NOTE — PHYSICAL THERAPY INITIAL EVALUATION ADULT - BALANCE TRAINING, PT EVAL
Pt will increase static/dynamic sitting balance to good and static/dynamic standing balance to good to perform all functional mobility c a Rolling Walker without LOB, by 2weeks.

## 2019-08-29 NOTE — PHYSICAL THERAPY INITIAL EVALUATION ADULT - ADDITIONAL COMMENTS
There are 5 steps, c sergio rails,  far apart and unable to be reached simultaneously, at the entry of the house and no need to negotiate steps at home.

## 2019-08-29 NOTE — PHYSICAL THERAPY INITIAL EVALUATION ADULT - CRITERIA FOR SKILLED THERAPEUTIC INTERVENTIONS
predicted duration of therapy intervention/therapy frequency/anticipated discharge recommendation/functional limitations in following categories/rehab potential/impairments found/risk reduction/prevention

## 2019-08-29 NOTE — PHYSICAL THERAPY INITIAL EVALUATION ADULT - TRANSFER TRAINING, PT EVAL
Pt will independently perform sit to stand to sit transfers without LOB using rolling walker by 2-3 days.

## 2019-08-30 LAB
ANION GAP SERPL CALC-SCNC: 8 MMOL/L — SIGNIFICANT CHANGE UP (ref 5–17)
BUN SERPL-MCNC: 29 MG/DL — HIGH (ref 7–23)
CALCIUM SERPL-MCNC: 8.9 MG/DL — SIGNIFICANT CHANGE UP (ref 8.5–10.1)
CHLORIDE SERPL-SCNC: 86 MMOL/L — LOW (ref 96–108)
CO2 SERPL-SCNC: 34 MMOL/L — HIGH (ref 22–31)
CREAT SERPL-MCNC: 0.88 MG/DL — SIGNIFICANT CHANGE UP (ref 0.5–1.3)
GLUCOSE SERPL-MCNC: 98 MG/DL — SIGNIFICANT CHANGE UP (ref 70–99)
HCT VFR BLD CALC: 34.9 % — SIGNIFICANT CHANGE UP (ref 34.5–45)
HGB BLD-MCNC: 11.2 G/DL — LOW (ref 11.5–15.5)
MCHC RBC-ENTMCNC: 25.3 PG — LOW (ref 27–34)
MCHC RBC-ENTMCNC: 32.1 GM/DL — SIGNIFICANT CHANGE UP (ref 32–36)
MCV RBC AUTO: 78.8 FL — LOW (ref 80–100)
NRBC # BLD: 0 /100 WBCS — SIGNIFICANT CHANGE UP (ref 0–0)
PLATELET # BLD AUTO: 260 K/UL — SIGNIFICANT CHANGE UP (ref 150–400)
POTASSIUM SERPL-MCNC: 3 MMOL/L — LOW (ref 3.5–5.3)
POTASSIUM SERPL-SCNC: 3 MMOL/L — LOW (ref 3.5–5.3)
RBC # BLD: 4.43 M/UL — SIGNIFICANT CHANGE UP (ref 3.8–5.2)
RBC # FLD: 16.2 % — HIGH (ref 10.3–14.5)
SODIUM SERPL-SCNC: 128 MMOL/L — LOW (ref 135–145)
URATE SERPL-MCNC: 8 MG/DL — HIGH (ref 2.5–7)
WBC # BLD: 6.34 K/UL — SIGNIFICANT CHANGE UP (ref 3.8–10.5)
WBC # FLD AUTO: 6.34 K/UL — SIGNIFICANT CHANGE UP (ref 3.8–10.5)

## 2019-08-30 PROCEDURE — 99232 SBSQ HOSP IP/OBS MODERATE 35: CPT

## 2019-08-30 RX ORDER — SODIUM CHLORIDE 0.65 %
2 AEROSOL, SPRAY (ML) NASAL EVERY 6 HOURS
Refills: 0 | Status: COMPLETED | OUTPATIENT
Start: 2019-08-30 | End: 2019-09-01

## 2019-08-30 RX ORDER — IPRATROPIUM/ALBUTEROL SULFATE 18-103MCG
3 AEROSOL WITH ADAPTER (GRAM) INHALATION EVERY 6 HOURS
Refills: 0 | Status: DISCONTINUED | OUTPATIENT
Start: 2019-08-30 | End: 2019-09-03

## 2019-08-30 RX ORDER — POTASSIUM CHLORIDE 20 MEQ
40 PACKET (EA) ORAL EVERY 4 HOURS
Refills: 0 | Status: COMPLETED | OUTPATIENT
Start: 2019-08-30 | End: 2019-08-30

## 2019-08-30 RX ADMIN — Medication 3 MILLILITER(S): at 11:09

## 2019-08-30 RX ADMIN — Medication 50 MILLIGRAM(S): at 05:10

## 2019-08-30 RX ADMIN — Medication 50 MILLIGRAM(S): at 18:08

## 2019-08-30 RX ADMIN — Medication 81 MILLIGRAM(S): at 12:06

## 2019-08-30 RX ADMIN — LISINOPRIL 20 MILLIGRAM(S): 2.5 TABLET ORAL at 05:09

## 2019-08-30 RX ADMIN — ENOXAPARIN SODIUM 40 MILLIGRAM(S): 100 INJECTION SUBCUTANEOUS at 12:06

## 2019-08-30 RX ADMIN — Medication 650 MILLIGRAM(S): at 05:09

## 2019-08-30 RX ADMIN — Medication 650 MILLIGRAM(S): at 05:33

## 2019-08-30 RX ADMIN — Medication 40 MILLIEQUIVALENT(S): at 21:57

## 2019-08-30 RX ADMIN — Medication 100 MILLIGRAM(S): at 13:19

## 2019-08-30 RX ADMIN — ATORVASTATIN CALCIUM 20 MILLIGRAM(S): 80 TABLET, FILM COATED ORAL at 21:57

## 2019-08-30 RX ADMIN — Medication 40 MILLIGRAM(S): at 18:08

## 2019-08-30 RX ADMIN — Medication 2 SPRAY(S): at 21:58

## 2019-08-30 RX ADMIN — Medication 40 MILLIEQUIVALENT(S): at 13:19

## 2019-08-30 RX ADMIN — Medication 650 MILLIGRAM(S): at 21:20

## 2019-08-30 RX ADMIN — Medication 100 MILLIGRAM(S): at 05:10

## 2019-08-30 RX ADMIN — Medication 40 MILLIGRAM(S): at 05:10

## 2019-08-30 RX ADMIN — ZOLPIDEM TARTRATE 5 MILLIGRAM(S): 10 TABLET ORAL at 22:00

## 2019-08-30 RX ADMIN — Medication 3 MILLILITER(S): at 05:29

## 2019-08-30 RX ADMIN — PANTOPRAZOLE SODIUM 40 MILLIGRAM(S): 20 TABLET, DELAYED RELEASE ORAL at 05:10

## 2019-08-30 RX ADMIN — Medication 40 MILLIGRAM(S): at 12:06

## 2019-08-30 RX ADMIN — Medication 100 MILLIGRAM(S): at 21:58

## 2019-08-30 RX ADMIN — Medication 650 MILLIGRAM(S): at 20:20

## 2019-08-30 RX ADMIN — Medication 40 MILLIEQUIVALENT(S): at 18:08

## 2019-08-30 NOTE — PROGRESS NOTE ADULT - SUBJECTIVE AND OBJECTIVE BOX
URIEL MAYNARD  85y  Female    Patient is a 85y old  Female who presents with a chief complaint of SOB/DILLARD (30 Aug 2019 12:36)      out of bed to chair  denies any chest pain or shortness of breath.      PAST MEDICAL & SURGICAL HISTORY:  Coronary bypass graft mechanical complication  ST elevation myocardial infarction (STEMI), unspecified artery  BETSY (acute kidney injury)  Insomnia, unspecified type  Gastroesophageal reflux disease without esophagitis  Acute congestive heart failure, unspecified congestive heart failure type  Hypertension  S/P CABG x 1: 9 days ago  S/P TKR (total knee replacement) using cement, bilateral          PHYSICAL EXAM:    T(C): 35.7 (19 @ 12:58), Max: 36.6 (19 @ 23:30)  HR: 61 (19 @ 12:58) (59 - 88)  BP: 148/51 (19 @ 12:58) (125/58 - 168/76)  RR: 16 (19 @ 12:58) (16 - 18)  SpO2: 100% (19 @ 12:58) (96% - 100%)  Wt(kg): --    I&O's Detail    29 Aug 2019 07:01  -  30 Aug 2019 07:00  --------------------------------------------------------  IN:    Oral Fluid: 840 mL  Total IN: 840 mL    OUT:    Voided: 604 mL  Total OUT: 604 mL    Total NET: 236 mL          Respiratory: clear anteriorly, decreased BS at bases  Cardiovascular: S1 S2  Gastrointestinal: soft NT ND +BS  Extremities: trace edema   Neuro: Awake and alert    MEDICATIONS  (STANDING):  ALBUTerol    0.083%. 2.5 milliGRAM(s) Nebulizer once  ALBUTerol/ipratropium for Nebulization 3 milliLiter(s) Nebulizer every 6 hours  aspirin enteric coated 81 milliGRAM(s) Oral daily  atorvastatin 20 milliGRAM(s) Oral at bedtime  docusate sodium 100 milliGRAM(s) Oral three times a day  enoxaparin Injectable 40 milliGRAM(s) SubCutaneous daily  FLUoxetine 40 milliGRAM(s) Oral daily  furosemide   Injectable 40 milliGRAM(s) IV Push two times a day  lisinopril 20 milliGRAM(s) Oral daily  metoprolol tartrate 50 milliGRAM(s) Oral two times a day  pantoprazole    Tablet 40 milliGRAM(s) Oral before breakfast  potassium chloride    Tablet ER 40 milliEquivalent(s) Oral every 4 hours    MEDICATIONS  (PRN):  acetaminophen   Tablet .. 650 milliGRAM(s) Oral every 6 hours PRN Mild Pain (1 - 3)  aluminum hydroxide/magnesium hydroxide/simethicone Suspension 30 milliLiter(s) Oral every 4 hours PRN Dyspepsia  LORazepam     Tablet 0.25 milliGRAM(s) Oral two times a day PRN Anxiety  zolpidem 5 milliGRAM(s) Oral at bedtime PRN Insomnia                            11.2   6.34  )-----------( 260      ( 30 Aug 2019 06:38 )             34.9       08-30    128<L>  |  86<L>  |  29<H>  ----------------------------<  98  3.0<L>   |  34<H>  |  0.88    Ca    8.9      30 Aug 2019 06:38  Phos  3.1     08-  Mg     2.1     -        Creatinine Trend: Creatinine Trend: 0.88<--, 0.74<--, 0.76<--, 0.81<--, 1.04<--, 0.70<--    Urinalysis Basic - ( 29 Aug 2019 22:46 )    Color: Yellow / Appearance: Clear / S.010 / pH: x  Gluc: x / Ketone: Negative  / Bili: Negative / Urobili: Negative mg/dL   Blood: x / Protein: Negative mg/dL / Nitrite: Negative   Leuk Esterase: Negative / RBC: 0-2 /HPF / WBC 0-2   Sq Epi: x / Non Sq Epi: Few / Bacteria: Occasional    Uric Acid, Serum (19 @ 02:33)    Uric Acid, Serum: 8.0 mg/dL

## 2019-08-30 NOTE — PROGRESS NOTE ADULT - SUBJECTIVE AND OBJECTIVE BOX
Patient is a 85y old  Female who presents with a chief complaint of SOB/DILLARD (29 Aug 2019 14:39)    PAST MEDICAL & SURGICAL HISTORY:  Coronary bypass graft mechanical complication  ST elevation myocardial infarction (STEMI), unspecified artery  BETSY (acute kidney injury)  Insomnia, unspecified type  Gastroesophageal reflux disease without esophagitis  Acute congestive heart failure, unspecified congestive heart failure type  Hypertension  S/P CABG x 1: 9 days ago  S/P TKR (total knee replacement) using cement, bilateral    INTERVAL HISTORY: Patient laying in bed, o2 NC,  reports improvement in dyspnea; denies chest pain, palpitations, cough or dizziness.  	  MEDICATIONS:  MEDICATIONS  (STANDING):  ALBUTerol    0.083%. 2.5 milliGRAM(s) Nebulizer once  ALBUTerol/ipratropium for Nebulization 3 milliLiter(s) Nebulizer every 6 hours  aspirin enteric coated 81 milliGRAM(s) Oral daily  atorvastatin 20 milliGRAM(s) Oral at bedtime  docusate sodium 100 milliGRAM(s) Oral three times a day  enoxaparin Injectable 40 milliGRAM(s) SubCutaneous daily  FLUoxetine 40 milliGRAM(s) Oral daily  furosemide   Injectable 40 milliGRAM(s) IV Push two times a day  lisinopril 20 milliGRAM(s) Oral daily  metoprolol tartrate 50 milliGRAM(s) Oral two times a day  pantoprazole    Tablet 40 milliGRAM(s) Oral before breakfast    MEDICATIONS  (PRN):  acetaminophen   Tablet .. 650 milliGRAM(s) Oral every 6 hours PRN Mild Pain (1 - 3)  aluminum hydroxide/magnesium hydroxide/simethicone Suspension 30 milliLiter(s) Oral every 4 hours PRN Dyspepsia  LORazepam     Tablet 0.25 milliGRAM(s) Oral two times a day PRN Anxiety  zolpidem 5 milliGRAM(s) Oral at bedtime PRN Insomnia      Vitals:  T(F): 96.8 (08-30-19 @ 06:32), Max: 97.8 (08-29-19 @ 23:30)  HR: 83 (08-30-19 @ 11:40) (59 - 88)  BP: 168/76 (08-30-19 @ 06:32) (125/58 - 168/76)  RR: 18 (08-30-19 @ 06:32) (18 - 18)  SpO2: 96% (08-30-19 @ 11:40) (96% - 100%)  Wt(kg): --59.3 kg    08-29 @ 07:01  -  08-30 @ 07:00  --------------------------------------------------------  IN:    Oral Fluid: 840 mL  Total IN: 840 mL    OUT:    Voided: 604 mL  Total OUT: 604 mL    Total NET: 236 mL    PHYSICAL EXAM:  Neuro: Awake, responsive  CV: S1 S2 RRR, + Murmur  Lungs: CTAB  GI: Soft, BS +, ND, NT  Extremities: trace LE edema    TELEMETRY: SR  	    RADIOLOGY: < from: CT Chest No Cont (08.28.19 @ 12:29) >  FINDINGS:    LUNGS AND AIRWAYS: Patent central airways.  Bilateral dependent and   basilar atelectasis.    PLEURA: Small to moderate bilateral pleural effusions.    MEDIASTINUM AND NEWTON:Prominent lymph nodes.    VESSELS: Atherosclerotic calcifications of thoracic aorta and coronary   arteries.    HEART: Enlarged. Mitral annulus calcifications. No pericardial effusion.    CHEST WALL AND LOWER NECK: Status post sternotomy. Subcutaneous soft   tissue edema.    VISUALIZED UPPER ABDOMEN: Hiatal hernia. Renal hypodensities, possibly   cysts. Atrophic pancreas.    BONES: Spinal degenerative changes.    IMPRESSION:     Small to moderate bilateral pleural effusions.    < end of copied text >      DIAGNOSTIC TESTING:    [x ] Echocardiogram: < from: TTE Echo Doppler w/o Cont (08.23.19 @ 16:30) >  ummary:   1. Left ventricular ejection fraction, by visual estimation, is 55 to   60%.   2. Normal left ventricular internal cavity size.   3. Spectral Doppler shows restrictive pattern of left ventricular  myocardial filling (Grade III diastolic dysfunction).   4. Normal right ventricular size and function.   5. The left atrium is normal in size.   6. The right atrium is normal in size.   7. There is no evidence of pericardial effusion.   8. Mild to moderate mitral annular calcification.   9. Mild to moderate mitral valve regurgitation.  10. Thickening of the anterior and posterior mitral valve leaflets.  11. Structurally normal tricuspid valve, with normal leaflet excursion.  12. Moderate aorticregurgitation.  13. Structurally normal pulmonic valve, with normal leaflet excursion.  14. Estimated pulmonary artery systolic pressure is 47.9 mmHg assuming a   right atrial pressure of 10 mmHg, which is consistent with mild pulmonary   hypertension.  15. Mitral valve mean gradient is 4.9 mmHg consistent with mild mitral   stenosis.    < end of copied text >    [x ]  Catheterization: < from: Cardiac Cath Lab - Adult (04.18.16 @ 18:01) >  CORONARY VESSELS: The coronary circulation is right dominant.  LM:   --  Distal left main: There was a 60 % stenosis.  LAD:   --  Ostial LAD: There was a 90 % stenosis.  --  Mid LAD: There was a 90 % stenosis.  CX:   --  Proximal circumflex: There was a 90 % stenosis.  RCA:   --  Mid RCA: There was a 100 % stenosis.    < end of copied text >      LABS:	 	    30 Aug 2019 06:38    128    |  86     |  29     ----------------------------<  98     3.0     |  34     |  0.88   29 Aug 2019 07:12    127    |  88     |  25     ----------------------------<  98     3.6     |  31     |  0.74   28 Aug 2019 07:25    129    |  91     |  29     ----------------------------<  111    4.0     |  31     |  0.76     Ca    8.9        30 Aug 2019 06:38  Phos  3.1       29 Aug 2019 07:12  Mg     2.1       29 Aug 2019 07:12                            11.2   6.34  )-----------( 260      ( 30 Aug 2019 06:38 )             34.9 ,                       11.3   5.65  )-----------( 218      ( 29 Aug 2019 07:12 )             34.8 ,                       10.4   6.72  )-----------( 256      ( 28 Aug 2019 07:25 )             32.6

## 2019-08-30 NOTE — PROGRESS NOTE ADULT - SUBJECTIVE AND OBJECTIVE BOX
86 yo lady on meds. Slowly improving      Vital Signs Last 24 Hrs  T(C): 37.7 (30 Aug 2019 17:35), Max: 37.7 (30 Aug 2019 17:35)  T(F): 99.8 (30 Aug 2019 17:35), Max: 99.8 (30 Aug 2019 17:35)  HR: 67 (30 Aug 2019 17:35) (60 - 88)  BP: 164/49 (30 Aug 2019 17:35) (140/60 - 168/76)  BP(mean): 82 (30 Aug 2019 17:35) (82 - 82)  RR: 22 (30 Aug 2019 17:35) (16 - 22)  SpO2: 100% (30 Aug 2019 17:35) (96% - 100%)                          11.2   6.34  )-----------( 260      ( 30 Aug 2019 06:38 )             34.9     08-30    128<L>  |  86<L>  |  29<H>  ----------------------------<  98  3.0<L>   |  34<H>  |  0.88    Ca    8.9      30 Aug 2019 06:38  Phos  3.1     08-29  Mg     2.1     08-29    MEDICATIONS  (STANDING):  ALBUTerol    0.083%. 2.5 milliGRAM(s) Nebulizer once  aspirin enteric coated 81 milliGRAM(s) Oral daily  atorvastatin 20 milliGRAM(s) Oral at bedtime  docusate sodium 100 milliGRAM(s) Oral three times a day  enoxaparin Injectable 40 milliGRAM(s) SubCutaneous daily  FLUoxetine 40 milliGRAM(s) Oral daily  furosemide   Injectable 40 milliGRAM(s) IV Push two times a day  lisinopril 20 milliGRAM(s) Oral daily  metoprolol tartrate 50 milliGRAM(s) Oral two times a day  pantoprazole    Tablet 40 milliGRAM(s) Oral before breakfast  potassium chloride    Tablet ER 40 milliEquivalent(s) Oral every 4 hours  sodium chloride 0.65% Nasal 2 Spray(s) Both Nostrils every 6 hours    MEDICATIONS  (PRN):  acetaminophen   Tablet .. 650 milliGRAM(s) Oral every 6 hours PRN Mild Pain (1 - 3)  ALBUTerol/ipratropium for Nebulization 3 milliLiter(s) Nebulizer every 6 hours PRN Bronchospasm  aluminum hydroxide/magnesium hydroxide/simethicone Suspension 30 milliLiter(s) Oral every 4 hours PRN Dyspepsia  LORazepam     Tablet 0.25 milliGRAM(s) Oral two times a day PRN Anxiety  zolpidem 5 milliGRAM(s) Oral at bedtime PRN Insomnia

## 2019-08-30 NOTE — PROGRESS NOTE ADULT - ASSESSMENT
85 white female with a history of HTN, CAD, CHF, COPD now admitted with increasing SOB and DILLARD with leg edema.   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   Advised on the importance of fluid restriction.   lasix at 40 mg twice  a day IVP. May decrease the lasix to once a day from tomorrow.  Will supplement potassium as ordered.

## 2019-08-30 NOTE — CHART NOTE - NSCHARTNOTEFT_GEN_A_CORE
Pt admitted c SOB & leg edema; PMHx of COPD & CHF also.    Factors impacting intake: [X ] none [ ] nausea  [ ] vomiting [ ] diarrhea [ ] constipation  [ ]chewing problems [ ] swallowing issues  [ ] other:     Diet Prescription: Diet, Regular:   Low Sodium  Supplement Feeding Modality:  Oral  Ensure Enlive Cans or Servings Per Day:  1       Frequency:  Two Times a day (08-25-19 @ 11:22)    Intake:  pt consuming 50 - 100%  mostly > 75%     Current Weight:   59.3 kg (8/30); previous wt 59 kg (8/25)  % Weight Change:  stable x 5 days    Nutrition focused physical exam conducted:    Subcutaneous fat loss: [mild ] Orbital fat pads region, [WDL ]Buccal fat region, [moderate ]Triceps region,  [WDL ]Ribs region.    Muscle wasting: [moderate ]Temples region, [moderate ]Clavicle region, [moderate ]Shoulder region, [unable ]Scapula region, [mild ]Interosseous region,  [WDL ]thigh region, [WDL ]Calf region    Physical Appearance:  no edema noted    Pertinent Medications: MEDICATIONS  (STANDING):  ALBUTerol    0.083%. 2.5 milliGRAM(s) Nebulizer once  ALBUTerol/ipratropium for Nebulization 3 milliLiter(s) Nebulizer every 6 hours  aspirin enteric coated 81 milliGRAM(s) Oral daily  atorvastatin 20 milliGRAM(s) Oral at bedtime  docusate sodium 100 milliGRAM(s) Oral three times a day  enoxaparin Injectable 40 milliGRAM(s) SubCutaneous daily  FLUoxetine 40 milliGRAM(s) Oral daily  furosemide   Injectable 40 milliGRAM(s) IV Push two times a day  lisinopril 20 milliGRAM(s) Oral daily  metoprolol tartrate 50 milliGRAM(s) Oral two times a day  pantoprazole    Tablet 40 milliGRAM(s) Oral before breakfast  potassium chloride    Tablet ER 40 milliEquivalent(s) Oral every 4 hours  sodium chloride 0.65% Nasal 2 Spray(s) Both Nostrils every 6 hours    MEDICATIONS  (PRN):  acetaminophen   Tablet .. 650 milliGRAM(s) Oral every 6 hours PRN Mild Pain (1 - 3)  aluminum hydroxide/magnesium hydroxide/simethicone Suspension 30 milliLiter(s) Oral every 4 hours PRN Dyspepsia  LORazepam     Tablet 0.25 milliGRAM(s) Oral two times a day PRN Anxiety  zolpidem 5 milliGRAM(s) Oral at bedtime PRN Insomnia    Pertinent Labs:   08-30 Na128 mmol/L<L> Glu 98 mg/dL K+ 3.0 mmol/L<L> Cr  0.88 mg/dL BUN 29 mg/dL<H> 08-29 Phos 3.1 mg/dL    Skin:   WDL    Estimated Needs:   [X ] no change since previous assessment  (8/25)  [ ] recalculated:     Previous Nutrition Diagnosis:   [X ] Moderate Malnutrition - chronic  Etiology:  Inadequate energy/protein intake related to CAD, CABG, CHF  Signs & Symptoms:  Physical signs of mild/moderate fat depletion & muscle wasting     GOAL:  Pt to consume > 75% meals/supplements; maintain wt +/- 2# during hospitalization    Nutrition Diagnosis is [X ] ongoing  [ ] resolved [ ] not applicable     New Nutrition Diagnosis: [X ] not applicable    Interventions:   continue current diet rx as noted  Recommend  [ ] Change Diet To:  [ ] Nutrition Supplement  [ ] Nutrition Support  [ ] Other:     Monitoring and Evaluation:   [X ] PO intake [ x ] Tolerance to diet prescription [ x ] weights [ x ] labs[ x ] follow up per protocol  [ ] other:

## 2019-08-30 NOTE — PROGRESS NOTE ADULT - ASSESSMENT
86 yo F with CAD, CABG, HTN, GERD pw SOB/DILLARD. Mild lower abd pain/soreness as well. Loose stools noted. Better on oxygen/diureses. Had increased water intake over the last few days.  Repeat echo with LVEF 55%, grade III DD, mild-mod MR, mild MS, mod AR, mild pulm HTN.   BNP 32538 ->8900  Trop neg x 2  Chest x ray: Increased lung volume is consistent with COPD. There are mild to moderate basilar effusions left greater than right. These are somewhat increased from August 22.  Tele: sinus 60s  with c/o difficulty breathing    ·	Acute on chronic diastolic HF, EF 55-60%, Gr III DD  ·	B/L pleural effusions-Chest CT small to moderate sergio pleural effusion.    ·	CAD  ·	HTN  ·	GERD  ·	Hyponatremia   ·	hypokalemia      PLAN:    -Na 128<127 <129 <132; Renal following.   -cont tele for now due to hyponatremia  -daily weight  -cont with supplemental O2/Duoneb   -cont asa/statin/lisinopril/metoprolol  -cont diuresis, Lasix changed to IVP bid  by renal to treat dilutional hyponatremia.   -monitor lytes/creat with diuresis, replete as needed  -Patient may benefit from PFT after DC  -Increase activity as tolerated     -

## 2019-08-31 LAB
ANION GAP SERPL CALC-SCNC: 8 MMOL/L — SIGNIFICANT CHANGE UP (ref 5–17)
BUN SERPL-MCNC: 33 MG/DL — HIGH (ref 7–23)
CALCIUM SERPL-MCNC: 8.5 MG/DL — SIGNIFICANT CHANGE UP (ref 8.5–10.1)
CHLORIDE SERPL-SCNC: 88 MMOL/L — LOW (ref 96–108)
CO2 SERPL-SCNC: 30 MMOL/L — SIGNIFICANT CHANGE UP (ref 22–31)
CREAT SERPL-MCNC: 0.83 MG/DL — SIGNIFICANT CHANGE UP (ref 0.5–1.3)
GLUCOSE SERPL-MCNC: 101 MG/DL — HIGH (ref 70–99)
HCT VFR BLD CALC: 32.4 % — LOW (ref 34.5–45)
HGB BLD-MCNC: 10.5 G/DL — LOW (ref 11.5–15.5)
MAGNESIUM SERPL-MCNC: 2.1 MG/DL — SIGNIFICANT CHANGE UP (ref 1.6–2.6)
MCHC RBC-ENTMCNC: 25.9 PG — LOW (ref 27–34)
MCHC RBC-ENTMCNC: 32.4 GM/DL — SIGNIFICANT CHANGE UP (ref 32–36)
MCV RBC AUTO: 79.8 FL — LOW (ref 80–100)
NRBC # BLD: 0 /100 WBCS — SIGNIFICANT CHANGE UP (ref 0–0)
PHOSPHATE SERPL-MCNC: 3.2 MG/DL — SIGNIFICANT CHANGE UP (ref 2.5–4.5)
PLATELET # BLD AUTO: 269 K/UL — SIGNIFICANT CHANGE UP (ref 150–400)
POTASSIUM SERPL-MCNC: 4.3 MMOL/L — SIGNIFICANT CHANGE UP (ref 3.5–5.3)
POTASSIUM SERPL-SCNC: 4.3 MMOL/L — SIGNIFICANT CHANGE UP (ref 3.5–5.3)
RBC # BLD: 4.06 M/UL — SIGNIFICANT CHANGE UP (ref 3.8–5.2)
RBC # FLD: 16.4 % — HIGH (ref 10.3–14.5)
SODIUM SERPL-SCNC: 126 MMOL/L — LOW (ref 135–145)
WBC # BLD: 6.52 K/UL — SIGNIFICANT CHANGE UP (ref 3.8–10.5)
WBC # FLD AUTO: 6.52 K/UL — SIGNIFICANT CHANGE UP (ref 3.8–10.5)

## 2019-08-31 RX ORDER — ZOLPIDEM TARTRATE 10 MG/1
5 TABLET ORAL AT BEDTIME
Refills: 0 | Status: DISCONTINUED | OUTPATIENT
Start: 2019-08-31 | End: 2019-09-03

## 2019-08-31 RX ORDER — SODIUM CHLORIDE 9 MG/ML
1 INJECTION INTRAMUSCULAR; INTRAVENOUS; SUBCUTANEOUS
Refills: 0 | Status: COMPLETED | OUTPATIENT
Start: 2019-08-31 | End: 2019-09-01

## 2019-08-31 RX ORDER — FUROSEMIDE 40 MG
60 TABLET ORAL
Refills: 0 | Status: DISCONTINUED | OUTPATIENT
Start: 2019-08-31 | End: 2019-09-02

## 2019-08-31 RX ORDER — MECLIZINE HCL 12.5 MG
12.5 TABLET ORAL ONCE
Refills: 0 | Status: COMPLETED | OUTPATIENT
Start: 2019-08-31 | End: 2019-08-31

## 2019-08-31 RX ADMIN — Medication 50 MILLIGRAM(S): at 17:04

## 2019-08-31 RX ADMIN — Medication 40 MILLIGRAM(S): at 05:48

## 2019-08-31 RX ADMIN — Medication 100 MILLIGRAM(S): at 22:02

## 2019-08-31 RX ADMIN — ATORVASTATIN CALCIUM 20 MILLIGRAM(S): 80 TABLET, FILM COATED ORAL at 22:01

## 2019-08-31 RX ADMIN — Medication 650 MILLIGRAM(S): at 12:01

## 2019-08-31 RX ADMIN — ENOXAPARIN SODIUM 40 MILLIGRAM(S): 100 INJECTION SUBCUTANEOUS at 11:09

## 2019-08-31 RX ADMIN — Medication 650 MILLIGRAM(S): at 22:01

## 2019-08-31 RX ADMIN — LISINOPRIL 20 MILLIGRAM(S): 2.5 TABLET ORAL at 05:48

## 2019-08-31 RX ADMIN — Medication 650 MILLIGRAM(S): at 11:14

## 2019-08-31 RX ADMIN — Medication 2 SPRAY(S): at 11:09

## 2019-08-31 RX ADMIN — SODIUM CHLORIDE 1 GRAM(S): 9 INJECTION INTRAMUSCULAR; INTRAVENOUS; SUBCUTANEOUS at 17:04

## 2019-08-31 RX ADMIN — Medication 40 MILLIGRAM(S): at 11:09

## 2019-08-31 RX ADMIN — Medication 60 MILLIGRAM(S): at 17:05

## 2019-08-31 RX ADMIN — Medication 3 MILLILITER(S): at 18:30

## 2019-08-31 RX ADMIN — Medication 650 MILLIGRAM(S): at 21:30

## 2019-08-31 RX ADMIN — PANTOPRAZOLE SODIUM 40 MILLIGRAM(S): 20 TABLET, DELAYED RELEASE ORAL at 06:05

## 2019-08-31 RX ADMIN — Medication 81 MILLIGRAM(S): at 11:09

## 2019-08-31 RX ADMIN — Medication 100 MILLIGRAM(S): at 05:48

## 2019-08-31 RX ADMIN — Medication 3 MILLILITER(S): at 11:35

## 2019-08-31 RX ADMIN — ZOLPIDEM TARTRATE 5 MILLIGRAM(S): 10 TABLET ORAL at 22:01

## 2019-08-31 RX ADMIN — Medication 2 SPRAY(S): at 17:04

## 2019-08-31 RX ADMIN — Medication 50 MILLIGRAM(S): at 05:48

## 2019-08-31 RX ADMIN — Medication 12.5 MILLIGRAM(S): at 22:01

## 2019-08-31 RX ADMIN — Medication 2 SPRAY(S): at 05:49

## 2019-08-31 NOTE — PROGRESS NOTE ADULT - SUBJECTIVE AND OBJECTIVE BOX
CARDIOLOGY COVERAGE FOR DESIRE FINE & SUSAN    HEMODYNAMICALLY STABLE  TELEMETRY: NORMAL SINUS RHYTHM  NO JVD  CLEAR  LUNGS  SOFT S1 & S2; NO APPRECIABLE M/R/G/H   ABDOMEN SOFT, NONTENDER, NO DISTENSION   NO CLUBBING CYANOSIS OR EDEMA    IMPROVED VOLUME STATUS  STABLE CARDIOVASCULAR STATUS; CONTINUING WITH PRESENT MANAGEMENT.

## 2019-08-31 NOTE — PROGRESS NOTE ADULT - SUBJECTIVE AND OBJECTIVE BOX
86 yo lady slowly improving.      Vital Signs Last 24 Hrs  T(C): 36.7 (31 Aug 2019 11:32), Max: 37.7 (30 Aug 2019 17:35)  T(F): 98.1 (31 Aug 2019 11:32), Max: 99.8 (30 Aug 2019 17:35)  HR: 56 (31 Aug 2019 11:37) (56 - 67)  BP: 156/63 (31 Aug 2019 11:32) (140/60 - 168/55)  BP(mean): 82 (30 Aug 2019 17:35) (82 - 82)  RR: 18 (31 Aug 2019 11:32) (16 - 22)  SpO2: 98% (31 Aug 2019 11:37) (97% - 100%)      MEDICATIONS  (STANDING):  ALBUTerol    0.083%. 2.5 milliGRAM(s) Nebulizer once  aspirin enteric coated 81 milliGRAM(s) Oral daily  atorvastatin 20 milliGRAM(s) Oral at bedtime  docusate sodium 100 milliGRAM(s) Oral three times a day  enoxaparin Injectable 40 milliGRAM(s) SubCutaneous daily  FLUoxetine 40 milliGRAM(s) Oral daily  furosemide   Injectable 40 milliGRAM(s) IV Push two times a day  lisinopril 20 milliGRAM(s) Oral daily  metoprolol tartrate 50 milliGRAM(s) Oral two times a day  pantoprazole    Tablet 40 milliGRAM(s) Oral before breakfast  sodium chloride 1 Gram(s) Oral two times a day  sodium chloride 0.65% Nasal 2 Spray(s) Both Nostrils every 6 hours    MEDICATIONS  (PRN):  acetaminophen   Tablet .. 650 milliGRAM(s) Oral every 6 hours PRN Mild Pain (1 - 3)  ALBUTerol/ipratropium for Nebulization 3 milliLiter(s) Nebulizer every 6 hours PRN Bronchospasm  aluminum hydroxide/magnesium hydroxide/simethicone Suspension 30 milliLiter(s) Oral every 4 hours PRN Dyspepsia  LORazepam     Tablet 0.25 milliGRAM(s) Oral two times a day PRN Anxiety

## 2019-08-31 NOTE — PROGRESS NOTE ADULT - SUBJECTIVE AND OBJECTIVE BOX
NEPHROLOGY PROGRESS NOTE    CHIEF COMPLAINT:  Hyponatremia    HPI:  Serum Na still low.    ROS:  a little dyspneic    EXAM:  T(F): 98.1 (19 @ 11:32)  HR: 56 (19 @ 11:37)  BP: 156/63 (19 @ 11:32)  RR: 18 (19 @ 11:32)  SpO2: 98% (19 @ 11:37)    Conversant, in no apparent distress  Normal respiratory effort, lungs diminished BS bases  Heart RRR with no murmur, mild pretibial edema         LABS                             10.5   6.52  )-----------( 269      ( 31 Aug 2019 08:28 )             32.4              126<L>  |  88<L>  |  33<H>  ----------------------------<  101<H>  4.3   |  30  |  0.83    Ca    8.5      31 Aug 2019 07:07  Phos  3.2       Mg     2.1                Urinalysis Basic - ( 29 Aug 2019 22:46 )    Color: Yellow / Appearance: Clear / S.010 / pH: x  Gluc: x / Ketone: Negative  / Bili: Negative / Urobili: Negative mg/dL   Blood: x / Protein: Negative mg/dL / Nitrite: Negative   Leuk Esterase: Negative / RBC: 0-2 /HPF / WBC 0-2   Sq Epi: x / Non Sq Epi: Few / Bacteria: Occasional    RADIOLOGY  CT scan showed moderate bilateral pleural effusions      ASSESSMENT:  1.  Hyponatremia, hypervolemic, worse    PLAN:  Increase lasix to 60 mg IVP BID  Oral fluids < 800 mL per day  Daily BMP

## 2019-08-31 NOTE — CHART NOTE - NSCHARTNOTEFT_GEN_A_CORE
Patient had a fall after using the commode, denies of any LOC, as per Lulu RN patient slipped from the commode to the floor onto her buttocks, no trauma to head or head injury.  Patient c/o dizziness which is chronic, patient is on Meclizine at home for several years and has not received it here.  Patient is not verbalizing any further complaints.    Vital Signs Last 24 Hrs  T(C): 36.9 (31 Aug 2019 16:58), Max: 36.9 (31 Aug 2019 00:01)  T(F): 98.5 (31 Aug 2019 16:58), Max: 98.5 (31 Aug 2019 00:01)  HR: 58 (31 Aug 2019 21:30) (56 - 64)  BP: 148/49 (31 Aug 2019 21:30) (147/46 - 168/55)  BP(mean): 85 (31 Aug 2019 16:58) (85 - 85)  RR: 161 (31 Aug 2019 21:30) (18 - 161)  SpO2: 100% (31 Aug 2019 16:58) (98% - 100%)    Gen: no respiratory distress  HEENT: no visible lacerations, bruising  CV: RSR no R/G/M  Lung: CTA without R/R/C/W      A/P: 1) Fall -no LOC  - will avoid CT head for now, will reassess the need at a later time if warranted  - fall precautions

## 2019-09-01 RX ADMIN — Medication 100 MILLIGRAM(S): at 21:13

## 2019-09-01 RX ADMIN — Medication 50 MILLIGRAM(S): at 06:36

## 2019-09-01 RX ADMIN — Medication 60 MILLIGRAM(S): at 06:35

## 2019-09-01 RX ADMIN — ZOLPIDEM TARTRATE 5 MILLIGRAM(S): 10 TABLET ORAL at 21:14

## 2019-09-01 RX ADMIN — ATORVASTATIN CALCIUM 20 MILLIGRAM(S): 80 TABLET, FILM COATED ORAL at 21:13

## 2019-09-01 RX ADMIN — Medication 2 SPRAY(S): at 06:38

## 2019-09-01 RX ADMIN — Medication 50 MILLIGRAM(S): at 17:29

## 2019-09-01 RX ADMIN — Medication 81 MILLIGRAM(S): at 11:23

## 2019-09-01 RX ADMIN — PANTOPRAZOLE SODIUM 40 MILLIGRAM(S): 20 TABLET, DELAYED RELEASE ORAL at 06:36

## 2019-09-01 RX ADMIN — LISINOPRIL 20 MILLIGRAM(S): 2.5 TABLET ORAL at 06:36

## 2019-09-01 RX ADMIN — Medication 100 MILLIGRAM(S): at 06:35

## 2019-09-01 RX ADMIN — ENOXAPARIN SODIUM 40 MILLIGRAM(S): 100 INJECTION SUBCUTANEOUS at 11:23

## 2019-09-01 RX ADMIN — Medication 100 MILLIGRAM(S): at 13:49

## 2019-09-01 RX ADMIN — SODIUM CHLORIDE 1 GRAM(S): 9 INJECTION INTRAMUSCULAR; INTRAVENOUS; SUBCUTANEOUS at 06:36

## 2019-09-01 RX ADMIN — Medication 40 MILLIGRAM(S): at 11:23

## 2019-09-01 RX ADMIN — Medication 650 MILLIGRAM(S): at 22:13

## 2019-09-01 RX ADMIN — Medication 650 MILLIGRAM(S): at 21:13

## 2019-09-01 RX ADMIN — Medication 2 SPRAY(S): at 11:23

## 2019-09-01 RX ADMIN — Medication 60 MILLIGRAM(S): at 17:29

## 2019-09-01 NOTE — PROGRESS NOTE ADULT - PROBLEM SELECTOR PROBLEM 3
Hypertension
CHF (congestive heart failure)
Coronary bypass graft mechanical complication
Hypertension
Coronary bypass graft mechanical complication
Hypertension

## 2019-09-01 NOTE — PROGRESS NOTE ADULT - SUBJECTIVE AND OBJECTIVE BOX
CARDIOLOGY COVERAGE FOR DESIRE FINE & SUSAN  HEMODYNAMICALLY STABLE  TELEMETRY: NORMAL SINUS RHYTHM    NO JVD  CLEAR LUNGS  SOFT S 1  ABDOMEN SOFT, NONTENDER, NO DISTENSION  NO CLUBBING CYANOSIS OR EDEMA    STABLE CARDIOVASCULAR STATUS; CONTINUING WITH PRESENT MANAGEMENT.

## 2019-09-01 NOTE — PROGRESS NOTE ADULT - SUBJECTIVE AND OBJECTIVE BOX
84 yo lady on meds. Slowly improving      Vital Signs Last 24 Hrs  T(C): 36.3 (01 Sep 2019 11:22), Max: 36.9 (31 Aug 2019 16:58)  T(F): 97.3 (01 Sep 2019 11:22), Max: 98.5 (31 Aug 2019 16:58)  HR: 54 (01 Sep 2019 11:22) (54 - 61)  BP: 152/37 (01 Sep 2019 11:22) (148/49 - 156/51)  BP(mean): 85 (31 Aug 2019 16:58) (85 - 85)  RR: 17 (01 Sep 2019 11:22) (16 - 161)  SpO2: 99% (01 Sep 2019 11:22) (99% - 100%)    MEDICATIONS  (STANDING):  ALBUTerol    0.083%. 2.5 milliGRAM(s) Nebulizer once  aspirin enteric coated 81 milliGRAM(s) Oral daily  atorvastatin 20 milliGRAM(s) Oral at bedtime  docusate sodium 100 milliGRAM(s) Oral three times a day  enoxaparin Injectable 40 milliGRAM(s) SubCutaneous daily  FLUoxetine 40 milliGRAM(s) Oral daily  furosemide   Injectable 60 milliGRAM(s) IV Push two times a day  lisinopril 20 milliGRAM(s) Oral daily  metoprolol tartrate 50 milliGRAM(s) Oral two times a day  pantoprazole    Tablet 40 milliGRAM(s) Oral before breakfast    MEDICATIONS  (PRN):  acetaminophen   Tablet .. 650 milliGRAM(s) Oral every 6 hours PRN Mild Pain (1 - 3)  ALBUTerol/ipratropium for Nebulization 3 milliLiter(s) Nebulizer every 6 hours PRN Bronchospasm  aluminum hydroxide/magnesium hydroxide/simethicone Suspension 30 milliLiter(s) Oral every 4 hours PRN Dyspepsia  LORazepam     Tablet 0.25 milliGRAM(s) Oral two times a day PRN Anxiety  zolpidem 5 milliGRAM(s) Oral at bedtime PRN Insomnia

## 2019-09-02 LAB
ANION GAP SERPL CALC-SCNC: 8 MMOL/L — SIGNIFICANT CHANGE UP (ref 5–17)
BUN SERPL-MCNC: 37 MG/DL — HIGH (ref 7–23)
CALCIUM SERPL-MCNC: 8.7 MG/DL — SIGNIFICANT CHANGE UP (ref 8.5–10.1)
CHLORIDE SERPL-SCNC: 94 MMOL/L — LOW (ref 96–108)
CO2 SERPL-SCNC: 32 MMOL/L — HIGH (ref 22–31)
CREAT SERPL-MCNC: 0.9 MG/DL — SIGNIFICANT CHANGE UP (ref 0.5–1.3)
GLUCOSE SERPL-MCNC: 136 MG/DL — HIGH (ref 70–99)
POTASSIUM SERPL-MCNC: 4 MMOL/L — SIGNIFICANT CHANGE UP (ref 3.5–5.3)
POTASSIUM SERPL-SCNC: 4 MMOL/L — SIGNIFICANT CHANGE UP (ref 3.5–5.3)
SODIUM SERPL-SCNC: 134 MMOL/L — LOW (ref 135–145)
URATE SERPL-MCNC: 9.5 MG/DL — HIGH (ref 2.5–7)

## 2019-09-02 RX ORDER — FUROSEMIDE 40 MG
40 TABLET ORAL
Refills: 0 | Status: DISCONTINUED | OUTPATIENT
Start: 2019-09-03 | End: 2019-09-03

## 2019-09-02 RX ORDER — LACTULOSE 10 G/15ML
10 SOLUTION ORAL ONCE
Refills: 0 | Status: COMPLETED | OUTPATIENT
Start: 2019-09-02 | End: 2019-09-02

## 2019-09-02 RX ADMIN — LACTULOSE 10 GRAM(S): 10 SOLUTION ORAL at 21:08

## 2019-09-02 RX ADMIN — Medication 650 MILLIGRAM(S): at 09:35

## 2019-09-02 RX ADMIN — Medication 100 MILLIGRAM(S): at 21:07

## 2019-09-02 RX ADMIN — Medication 60 MILLIGRAM(S): at 17:23

## 2019-09-02 RX ADMIN — LISINOPRIL 20 MILLIGRAM(S): 2.5 TABLET ORAL at 05:07

## 2019-09-02 RX ADMIN — Medication 50 MILLIGRAM(S): at 05:07

## 2019-09-02 RX ADMIN — PANTOPRAZOLE SODIUM 40 MILLIGRAM(S): 20 TABLET, DELAYED RELEASE ORAL at 05:07

## 2019-09-02 RX ADMIN — Medication 650 MILLIGRAM(S): at 21:05

## 2019-09-02 RX ADMIN — Medication 650 MILLIGRAM(S): at 08:39

## 2019-09-02 RX ADMIN — ENOXAPARIN SODIUM 40 MILLIGRAM(S): 100 INJECTION SUBCUTANEOUS at 11:28

## 2019-09-02 RX ADMIN — Medication 81 MILLIGRAM(S): at 11:28

## 2019-09-02 RX ADMIN — Medication 100 MILLIGRAM(S): at 05:07

## 2019-09-02 RX ADMIN — Medication 3 MILLILITER(S): at 09:55

## 2019-09-02 RX ADMIN — ATORVASTATIN CALCIUM 20 MILLIGRAM(S): 80 TABLET, FILM COATED ORAL at 21:05

## 2019-09-02 RX ADMIN — Medication 40 MILLIGRAM(S): at 11:08

## 2019-09-02 RX ADMIN — Medication 60 MILLIGRAM(S): at 05:08

## 2019-09-02 RX ADMIN — ZOLPIDEM TARTRATE 5 MILLIGRAM(S): 10 TABLET ORAL at 21:05

## 2019-09-02 NOTE — PROGRESS NOTE ADULT - ASSESSMENT
Impression:  Resolving hypervolemic hyponatremia, getting more azotemic, volume excess improved  ? Constipation    Recommend:  Change Lasix to 40 mg B.I.D.  Lactulose  Re-check TSH

## 2019-09-02 NOTE — PROGRESS NOTE ADULT - PROBLEM SELECTOR PROBLEM 2
Coronary bypass graft mechanical complication
Hypertension
Acute on chronic combined systolic and diastolic congestive heart failure
Coronary bypass graft mechanical complication
Coronary bypass graft mechanical complication
Acute on chronic combined systolic and diastolic congestive heart failure
Coronary bypass graft mechanical complication

## 2019-09-02 NOTE — PROGRESS NOTE ADULT - SUBJECTIVE AND OBJECTIVE BOX
86 yo lady on meds. Improving.      Vital Signs Last 24 Hrs  T(C): 36.2 (02 Sep 2019 11:10), Max: 36.8 (02 Sep 2019 00:27)  T(F): 97.1 (02 Sep 2019 11:10), Max: 98.3 (02 Sep 2019 00:27)  HR: 55 (02 Sep 2019 11:10) (55 - 92)  BP: 166/52 (02 Sep 2019 11:10) (141/47 - 166/52)  BP(mean): --  RR: 18 (02 Sep 2019 11:10) (16 - 19)  SpO2: 98% (02 Sep 2019 11:10) (98% - 100%)      MEDICATIONS  (STANDING):  ALBUTerol    0.083%. 2.5 milliGRAM(s) Nebulizer once  aspirin enteric coated 81 milliGRAM(s) Oral daily  atorvastatin 20 milliGRAM(s) Oral at bedtime  docusate sodium 100 milliGRAM(s) Oral three times a day  enoxaparin Injectable 40 milliGRAM(s) SubCutaneous daily  FLUoxetine 40 milliGRAM(s) Oral daily  furosemide   Injectable 60 milliGRAM(s) IV Push two times a day  lisinopril 20 milliGRAM(s) Oral daily  metoprolol tartrate 50 milliGRAM(s) Oral two times a day  pantoprazole    Tablet 40 milliGRAM(s) Oral before breakfast    MEDICATIONS  (PRN):  acetaminophen   Tablet .. 650 milliGRAM(s) Oral every 6 hours PRN Mild Pain (1 - 3)  ALBUTerol/ipratropium for Nebulization 3 milliLiter(s) Nebulizer every 6 hours PRN Bronchospasm  aluminum hydroxide/magnesium hydroxide/simethicone Suspension 30 milliLiter(s) Oral every 4 hours PRN Dyspepsia  LORazepam     Tablet 0.25 milliGRAM(s) Oral two times a day PRN Anxiety  zolpidem 5 milliGRAM(s) Oral at bedtime PRN Insomnia

## 2019-09-02 NOTE — PROGRESS NOTE ADULT - PROBLEM SELECTOR PROBLEM 1
Acute on chronic combined systolic and diastolic congestive heart failure
CHF (congestive heart failure)
Acute on chronic combined systolic and diastolic congestive heart failure
Acute on chronic combined systolic and diastolic congestive heart failure
CHF (congestive heart failure)
Acute on chronic combined systolic and diastolic congestive heart failure

## 2019-09-02 NOTE — PROGRESS NOTE ADULT - NEUROLOGICAL
Alert & oriented; no sensory, motor or coordination deficits, normal reflexes
Alert & oriented; no sensory, motor or coordination deficits, normal reflexes
negative
Alert & oriented; no sensory, motor or coordination deficits, normal reflexes

## 2019-09-02 NOTE — PROGRESS NOTE ADULT - MUSCULOSKELETAL
No joint pain, swelling or deformity; no limitation of movement
negative
No joint pain, swelling or deformity; no limitation of movement
negative
No joint pain, swelling or deformity; no limitation of movement

## 2019-09-02 NOTE — PROGRESS NOTE ADULT - SUBJECTIVE AND OBJECTIVE BOX
CARDIOLOGY COVERAGE FOR DESIRE FINE & SUSAN    HEMODYNAMICALLY STABLE  RESTING COMFORTABLY   STABLE CARDIOVASCULAR STATUS; CONTINUING WITH PRESENT MANAGEMENT.

## 2019-09-02 NOTE — PROGRESS NOTE ADULT - SUBJECTIVE AND OBJECTIVE BOX
Patient seen in follow up for hyponatremia. C/o constipation and abdominal discomfort    Coronary bypass graft mechanical complication  ST elevation myocardial infarction (STEMI), unspecified artery  BETSY (acute kidney injury)  Insomnia, unspecified type  Gastroesophageal reflux disease without esophagitis  Acute congestive heart failure, unspecified congestive heart failure type  Hypertension    MEDICATIONS  (STANDING):  ALBUTerol    0.083%. 2.5 milliGRAM(s) Nebulizer once  aspirin enteric coated 81 milliGRAM(s) Oral daily  atorvastatin 20 milliGRAM(s) Oral at bedtime  docusate sodium 100 milliGRAM(s) Oral three times a day  enoxaparin Injectable 40 milliGRAM(s) SubCutaneous daily  FLUoxetine 40 milliGRAM(s) Oral daily  furosemide   Injectable 60 milliGRAM(s) IV Push two times a day  lisinopril 20 milliGRAM(s) Oral daily  metoprolol tartrate 50 milliGRAM(s) Oral two times a day  pantoprazole    Tablet 40 milliGRAM(s) Oral before breakfast    MEDICATIONS  (PRN):  acetaminophen   Tablet .. 650 milliGRAM(s) Oral every 6 hours PRN Mild Pain (1 - 3)  ALBUTerol/ipratropium for Nebulization 3 milliLiter(s) Nebulizer every 6 hours PRN Bronchospasm  aluminum hydroxide/magnesium hydroxide/simethicone Suspension 30 milliLiter(s) Oral every 4 hours PRN Dyspepsia  LORazepam     Tablet 0.25 milliGRAM(s) Oral two times a day PRN Anxiety  zolpidem 5 milliGRAM(s) Oral at bedtime PRN Insomnia    T(C): 35.9 (09-02-19 @ 17:12), Max: 36.8 (09-02-19 @ 00:27)  HR: 53 (09-02-19 @ 17:12) (53 - 92)  BP: 150/44 (09-02-19 @ 17:12) (141/47 - 166/52)  RR: 17 (09-02-19 @ 17:12) (16 - 19)  SpO2: 100% (09-02-19 @ 17:12) (98% - 100%)  Wt(kg): --  I&O's Detail    01 Sep 2019 07:01  -  02 Sep 2019 07:00  --------------------------------------------------------  IN:    Oral Fluid: 200 mL  Total IN: 200 mL    OUT:  Total OUT: 0 mL    Total NET: 200 mL      02 Sep 2019 07:01  -  02 Sep 2019 20:32  --------------------------------------------------------  IN:    Oral Fluid: 120 mL  Total IN: 120 mL    OUT:  Total OUT: 0 mL    Total NET: 120 mL        PHYSICAL EXAM:  General: NAD, supine, [place], not dyspneic  No JVD  Respiratory: b/l air entry, clear  Cardiovascular: S1 S2  Gastrointestinal: soft, mild midabdominal tenderness  Extremities: no edema      09-02    134<L>  |  94<L>  |  37<H>  ----------------------------<  136<H>  4.0   |  32<H>  |  0.90    Ca    8.7      02 Sep 2019 10:03          Sodium, Serum: 134 (09-02 @ 10:03)  Sodium, Serum: 126 (08-31 @ 07:07)    Creatinine, Serum: 0.90 (09-02 @ 10:03)  Creatinine, Serum: 0.83 (08-31 @ 07:07)    Potassium, Serum: 4.0 (09-02 @ 10:03)  Potassium, Serum: 4.3 (08-31 @ 07:07)    Hemoglobin: 10.5 (08-31 @ 08:28)

## 2019-09-03 ENCOUNTER — TRANSCRIPTION ENCOUNTER (OUTPATIENT)
Age: 84
End: 2019-09-03

## 2019-09-03 VITALS
OXYGEN SATURATION: 100 % | DIASTOLIC BLOOD PRESSURE: 50 MMHG | RESPIRATION RATE: 18 BRPM | HEART RATE: 68 BPM | SYSTOLIC BLOOD PRESSURE: 166 MMHG

## 2019-09-03 LAB
ANION GAP SERPL CALC-SCNC: 6 MMOL/L — SIGNIFICANT CHANGE UP (ref 5–17)
BUN SERPL-MCNC: 37 MG/DL — HIGH (ref 7–23)
CALCIUM SERPL-MCNC: 8.6 MG/DL — SIGNIFICANT CHANGE UP (ref 8.5–10.1)
CHLORIDE SERPL-SCNC: 93 MMOL/L — LOW (ref 96–108)
CO2 SERPL-SCNC: 34 MMOL/L — HIGH (ref 22–31)
CREAT SERPL-MCNC: 0.74 MG/DL — SIGNIFICANT CHANGE UP (ref 0.5–1.3)
GLUCOSE SERPL-MCNC: 104 MG/DL — HIGH (ref 70–99)
MAGNESIUM SERPL-MCNC: 2.2 MG/DL — SIGNIFICANT CHANGE UP (ref 1.6–2.6)
POTASSIUM SERPL-MCNC: 3.6 MMOL/L — SIGNIFICANT CHANGE UP (ref 3.5–5.3)
POTASSIUM SERPL-SCNC: 3.6 MMOL/L — SIGNIFICANT CHANGE UP (ref 3.5–5.3)
SODIUM SERPL-SCNC: 133 MMOL/L — LOW (ref 135–145)
TSH SERPL-MCNC: 0.46 UIU/ML — SIGNIFICANT CHANGE UP (ref 0.36–3.74)

## 2019-09-03 PROCEDURE — 99232 SBSQ HOSP IP/OBS MODERATE 35: CPT

## 2019-09-03 RX ORDER — DOCUSATE SODIUM 100 MG
1 CAPSULE ORAL
Qty: 90 | Refills: 0
Start: 2019-09-03 | End: 2019-10-02

## 2019-09-03 RX ADMIN — Medication 100 MILLIGRAM(S): at 13:27

## 2019-09-03 RX ADMIN — Medication 40 MILLIGRAM(S): at 05:37

## 2019-09-03 RX ADMIN — Medication 40 MILLIGRAM(S): at 12:19

## 2019-09-03 RX ADMIN — ENOXAPARIN SODIUM 40 MILLIGRAM(S): 100 INJECTION SUBCUTANEOUS at 12:19

## 2019-09-03 RX ADMIN — PANTOPRAZOLE SODIUM 40 MILLIGRAM(S): 20 TABLET, DELAYED RELEASE ORAL at 05:37

## 2019-09-03 RX ADMIN — Medication 100 MILLIGRAM(S): at 05:37

## 2019-09-03 RX ADMIN — Medication 650 MILLIGRAM(S): at 13:09

## 2019-09-03 RX ADMIN — Medication 650 MILLIGRAM(S): at 12:22

## 2019-09-03 RX ADMIN — Medication 81 MILLIGRAM(S): at 12:19

## 2019-09-03 NOTE — DISCHARGE NOTE PROVIDER - HOSPITAL COURSE
86 yo lady has been slowly noting progressive DILLARD and SOB. She has a known DCM and COPD. She is now admitted with decompensated combined CHF.    Patient with decompensated CHF and COPD.     Seen by Dr. Gonzalez cardiology.    HEMODYNAMICALLY STABLE    RESTING COMFORTABLY     STABLE CARDIOVASCULAR STATUS; CONTINUING WITH PRESENT MANAGEMENT.     Seen by Nephrology     Assessment and Plan:     Impression:    Resolving hypervolemic hyponatremia, getting more azotemic, volume excess improved    ? Constipation    Recommend:    Change Lasix to 40 mg B.I.D.    Lactulose    Re-check TSH.    86 yo lady improving. Will probably need Rehab. But patient refusing rehab placement.    Problem/Plan - 1:    ·  Problem: Acute on chronic combined systolic and diastolic congestive heart failure.     Problem/Plan - 2:    ·  Problem: Coronary bypass graft mechanical complication.     Patient is medically stable for discharge.

## 2019-09-03 NOTE — DISCHARGE NOTE PROVIDER - NSDCCPCAREPLAN_GEN_ALL_CORE_FT
PRINCIPAL DISCHARGE DIAGNOSIS  Diagnosis: CHF (congestive heart failure)  Assessment and Plan of Treatment: Stable follow up with Dr. Gonzalez.      SECONDARY DISCHARGE DIAGNOSES  Diagnosis: Hypertension  Assessment and Plan of Treatment: stable, continue home medications.

## 2019-09-03 NOTE — DISCHARGE NOTE PROVIDER - PROVIDER TOKENS
PROVIDER:[TOKEN:[[id number]]],PROVIDER:[TOKEN:[[id number]]],PROVIDER:[TOKEN:[[id number]]]

## 2019-09-03 NOTE — PROGRESS NOTE ADULT - SUBJECTIVE AND OBJECTIVE BOX
Patient is a 85y old  Female who presents with a chief complaint of SOB/DILLARD (29 Aug 2019 14:39)    PAST MEDICAL & SURGICAL HISTORY:  Coronary bypass graft mechanical complication  ST elevation myocardial infarction (STEMI), unspecified artery  BETSY (acute kidney injury)  Insomnia, unspecified type  Gastroesophageal reflux disease without esophagitis  Acute congestive heart failure, unspecified congestive heart failure type  Hypertension  S/P CABG x 1: 9 days ago  S/P TKR (total knee replacement) using cement, bilateral    INTERVAL HISTORY: Patient laying in bed, o2 NC,  reports improvement in dyspnea; denies chest pain, palpitations, cough or dizziness.  	  MEDICATIONS  (STANDING):  ALBUTerol    0.083%. 2.5 milliGRAM(s) Nebulizer once  aspirin enteric coated 81 milliGRAM(s) Oral daily  atorvastatin 20 milliGRAM(s) Oral at bedtime  docusate sodium 100 milliGRAM(s) Oral three times a day  enoxaparin Injectable 40 milliGRAM(s) SubCutaneous daily  FLUoxetine 40 milliGRAM(s) Oral daily  furosemide    Tablet 40 milliGRAM(s) Oral two times a day  lisinopril 20 milliGRAM(s) Oral daily  metoprolol tartrate 50 milliGRAM(s) Oral two times a day  pantoprazole    Tablet 40 milliGRAM(s) Oral before breakfast    MEDICATIONS  (PRN):  acetaminophen   Tablet .. 650 milliGRAM(s) Oral every 6 hours PRN Mild Pain (1 - 3)  ALBUTerol/ipratropium for Nebulization 3 milliLiter(s) Nebulizer every 6 hours PRN Bronchospasm  aluminum hydroxide/magnesium hydroxide/simethicone Suspension 30 milliLiter(s) Oral every 4 hours PRN Dyspepsia  LORazepam     Tablet 0.25 milliGRAM(s) Oral two times a day PRN Anxiety  zolpidem 5 milliGRAM(s) Oral at bedtime PRN Insomnia        Vitals:  Vital Signs Last 24 Hrs  T(C): 36.1 (03 Sep 2019 10:00), Max: 36.5 (03 Sep 2019 04:49)  T(F): 97 (03 Sep 2019 10:00), Max: 97.7 (03 Sep 2019 04:49)  HR: 68 (03 Sep 2019 10:15) (52 - 68)  BP: 166/50 (03 Sep 2019 10:15) (150/44 - 166/50)  BP(mean): 44 (03 Sep 2019 04:49) (44 - 79)  RR: 18 (03 Sep 2019 10:15) (17 - 18)  SpO2: 100% (03 Sep 2019 10:15) (100% - 100%)      PHYSICAL EXAM:  Neuro: Awake, responsive  CV: S1 S2 RRR, + Murmur  Lungs: CTAB  GI: Soft, BS +, ND, NT  Extremities: trace LE edema    TELEMETRY: SR  	    RADIOLOGY: < from: CT Chest No Cont (08.28.19 @ 12:29) >  FINDINGS:    LUNGS AND AIRWAYS: Patent central airways.  Bilateral dependent and   basilar atelectasis.    PLEURA: Small to moderate bilateral pleural effusions.    MEDIASTINUM AND NEWTON:Prominent lymph nodes.    VESSELS: Atherosclerotic calcifications of thoracic aorta and coronary   arteries.    HEART: Enlarged. Mitral annulus calcifications. No pericardial effusion.    CHEST WALL AND LOWER NECK: Status post sternotomy. Subcutaneous soft   tissue edema.    VISUALIZED UPPER ABDOMEN: Hiatal hernia. Renal hypodensities, possibly   cysts. Atrophic pancreas.    BONES: Spinal degenerative changes.    IMPRESSION:     Small to moderate bilateral pleural effusions.    < end of copied text >      DIAGNOSTIC TESTING:    [x ] Echocardiogram: < from: TTE Echo Doppler w/o Cont (08.23.19 @ 16:30) >  ummary:   1. Left ventricular ejection fraction, by visual estimation, is 55 to   60%.   2. Normal left ventricular internal cavity size.   3. Spectral Doppler shows restrictive pattern of left ventricular  myocardial filling (Grade III diastolic dysfunction).   4. Normal right ventricular size and function.   5. The left atrium is normal in size.   6. The right atrium is normal in size.   7. There is no evidence of pericardial effusion.   8. Mild to moderate mitral annular calcification.   9. Mild to moderate mitral valve regurgitation.  10. Thickening of the anterior and posterior mitral valve leaflets.  11. Structurally normal tricuspid valve, with normal leaflet excursion.  12. Moderate aorticregurgitation.  13. Structurally normal pulmonic valve, with normal leaflet excursion.  14. Estimated pulmonary artery systolic pressure is 47.9 mmHg assuming a   right atrial pressure of 10 mmHg, which is consistent with mild pulmonary   hypertension.  15. Mitral valve mean gradient is 4.9 mmHg consistent with mild mitral   stenosis.    < end of copied text >    [x ]  Catheterization: < from: Cardiac Cath Lab - Adult (04.18.16 @ 18:01) >  CORONARY VESSELS: The coronary circulation is right dominant.  LM:   --  Distal left main: There was a 60 % stenosis.  LAD:   --  Ostial LAD: There was a 90 % stenosis.  --  Mid LAD: There was a 90 % stenosis.  CX:   --  Proximal circumflex: There was a 90 % stenosis.  RCA:   --  Mid RCA: There was a 100 % stenosis.    < end of copied text >      LABS:	 	    09-03    133<L>  |  93<L>  |  37<H>  ----------------------------<  104<H>  3.6   |  34<H>  |  0.74    Ca    8.6      03 Sep 2019 06:20  Mg     2.2     09-03

## 2019-09-03 NOTE — DISCHARGE NOTE NURSING/CASE MANAGEMENT/SOCIAL WORK - PATIENT PORTAL LINK FT
You can access the FollowMyHealth Patient Portal offered by [facility] by registering at the following website: [email or web address]. By joining [email or web address] FollowMyHealth portal, you will also be able to view your health information using other applications (apps) compatible with our system.

## 2019-09-03 NOTE — PROGRESS NOTE ADULT - REASON FOR ADMISSION
SOB/DILLARD
SOB/HF
SOB/HF
SOB/DILLARD
SOB
SOB/DILLARD
SOB

## 2019-09-03 NOTE — DISCHARGE NOTE PROVIDER - CARE PROVIDER_API CALL
Lyle Heaton)  Internal Medicine; Nephrology  [address], [address] [place] [id number]  Phone: [phone]  Fax: [phone]  Follow Up Time:     Jason Gonzalez)  Cardiology; Internal Medicine; Nuclear Cardiology  [address] [place] [address]  Phone: [phone]  Fax: [phone]  Follow Up Time:     Alejo Stuart)  Medicine  [address] [place] [address]  Phone: [phone]  Fax: [phone]  Follow Up Time:

## 2019-09-03 NOTE — PROGRESS NOTE ADULT - SUBJECTIVE AND OBJECTIVE BOX
Patient feels comfortable no distress    MEDICATIONS  (STANDING):  ALBUTerol    0.083%. 2.5 milliGRAM(s) Nebulizer once  aspirin enteric coated 81 milliGRAM(s) Oral daily  atorvastatin 20 milliGRAM(s) Oral at bedtime  docusate sodium 100 milliGRAM(s) Oral three times a day  enoxaparin Injectable 40 milliGRAM(s) SubCutaneous daily  FLUoxetine 40 milliGRAM(s) Oral daily  furosemide    Tablet 40 milliGRAM(s) Oral two times a day  lisinopril 20 milliGRAM(s) Oral daily  metoprolol tartrate 50 milliGRAM(s) Oral two times a day  pantoprazole    Tablet 40 milliGRAM(s) Oral before breakfast    MEDICATIONS  (PRN):  acetaminophen   Tablet .. 650 milliGRAM(s) Oral every 6 hours PRN Mild Pain (1 - 3)  ALBUTerol/ipratropium for Nebulization 3 milliLiter(s) Nebulizer every 6 hours PRN Bronchospasm  aluminum hydroxide/magnesium hydroxide/simethicone Suspension 30 milliLiter(s) Oral every 4 hours PRN Dyspepsia  LORazepam     Tablet 0.25 milliGRAM(s) Oral two times a day PRN Anxiety  zolpidem 5 milliGRAM(s) Oral at bedtime PRN Insomnia      09-02-19 @ 07:01  -  09-03-19 @ 07:00  --------------------------------------------------------  IN: 120 mL / OUT: 200 mL / NET: -80 mL    09-03-19 @ 07:01  -  09-03-19 @ 17:43  --------------------------------------------------------  IN: 0 mL / OUT: 1 mL / NET: -1 mL      PHYSICAL EXAM:      T(C): 36.1 (09-03-19 @ 10:00), Max: 36.5 (09-03-19 @ 04:49)  HR: 68 (09-03-19 @ 10:15) (52 - 68)  BP: 166/50 (09-03-19 @ 10:15) (155/40 - 166/50)  RR: 18 (09-03-19 @ 10:15) (18 - 18)  SpO2: 100% (09-03-19 @ 10:15) (100% - 100%)  Wt(kg): --  Respiratory: clear anteriorly, decreased BS at bases  Cardiovascular: S1 S2  Gastrointestinal: soft NT ND +BS  Extremities:   1 edema                09-03    133<L>  |  93<L>  |  37<H>  ----------------------------<  104<H>  3.6   |  34<H>  |  0.74    Ca    8.6      03 Sep 2019 06:20  Mg     2.2     09-03          Creatinine Trend: 0.74<--, 0.90<--, 0.83<--, 0.88<--, 0.74<--, 0.76<--  Assessment and Plan:    Hyponatremia, euvolemic, stable;   Supportive care.

## 2019-09-03 NOTE — PROGRESS NOTE ADULT - PROVIDER SPECIALTY LIST ADULT
Cardiology
Internal Medicine
Nephrology
Internal Medicine
Internal Medicine

## 2019-09-03 NOTE — PROGRESS NOTE ADULT - ASSESSMENT
84 yo F with CAD, CABG, HTN, GERD pw SOB/DILLARD. Mild lower abd pain/soreness as well. Loose stools noted. Better on oxygen/diureses. Had increased water intake over the last few days.  Repeat echo with LVEF 55%, grade III DD, mild-mod MR, mild MS, mod AR, mild pulm HTN.   BNP 98505 ->8900  Trop neg x 2  Chest x ray: Increased lung volume is consistent with COPD. There are mild to moderate basilar effusions left greater than right. These are somewhat increased from August 22.  Tele: sinus 60s  with c/o difficulty breathing    ·	Acute on chronic diastolic HF, EF 55-60%, Gr III DD  ·	B/L pleural effusions-Chest CT small to moderate sergio pleural effusion.    ·	CAD  ·	HTN  ·	GERD  ·	Hyponatremia   ·	hypokalemia      PLAN:    -Na improved to 133; Renal following  -lasix changed to 40mg BID   -daily weight  -cont with supplemental O2/Duoneb   -cont asa/statin/lisinopril/metoprolol  -monitor lytes/creat with diuresis, replete as needed  -Patient may benefit from PFT after DC  -Increase activity as tolerated

## 2019-09-05 DIAGNOSIS — E22.2 SYNDROME OF INAPPROPRIATE SECRETION OF ANTIDIURETIC HORMONE: ICD-10-CM

## 2019-09-05 DIAGNOSIS — E87.6 HYPOKALEMIA: ICD-10-CM

## 2019-09-05 DIAGNOSIS — I50.43 ACUTE ON CHRONIC COMBINED SYSTOLIC (CONGESTIVE) AND DIASTOLIC (CONGESTIVE) HEART FAILURE: ICD-10-CM

## 2019-09-05 DIAGNOSIS — Z79.82 LONG TERM (CURRENT) USE OF ASPIRIN: ICD-10-CM

## 2019-09-05 DIAGNOSIS — E44.0 MODERATE PROTEIN-CALORIE MALNUTRITION: ICD-10-CM

## 2019-09-05 DIAGNOSIS — K21.9 GASTRO-ESOPHAGEAL REFLUX DISEASE WITHOUT ESOPHAGITIS: ICD-10-CM

## 2019-09-05 DIAGNOSIS — Z91.14 PATIENT'S OTHER NONCOMPLIANCE WITH MEDICATION REGIMEN: ICD-10-CM

## 2019-09-05 DIAGNOSIS — I11.0 HYPERTENSIVE HEART DISEASE WITH HEART FAILURE: ICD-10-CM

## 2019-09-05 DIAGNOSIS — Z95.1 PRESENCE OF AORTOCORONARY BYPASS GRAFT: ICD-10-CM

## 2019-09-05 DIAGNOSIS — R06.02 SHORTNESS OF BREATH: ICD-10-CM

## 2019-09-05 DIAGNOSIS — G47.00 INSOMNIA, UNSPECIFIED: ICD-10-CM

## 2019-09-05 DIAGNOSIS — I27.20 PULMONARY HYPERTENSION, UNSPECIFIED: ICD-10-CM

## 2019-09-05 DIAGNOSIS — E87.1 HYPO-OSMOLALITY AND HYPONATREMIA: ICD-10-CM

## 2019-09-05 DIAGNOSIS — I25.10 ATHEROSCLEROTIC HEART DISEASE OF NATIVE CORONARY ARTERY WITHOUT ANGINA PECTORIS: ICD-10-CM

## 2019-09-05 DIAGNOSIS — E87.70 FLUID OVERLOAD, UNSPECIFIED: ICD-10-CM

## 2019-09-05 DIAGNOSIS — J44.9 CHRONIC OBSTRUCTIVE PULMONARY DISEASE, UNSPECIFIED: ICD-10-CM

## 2019-09-05 DIAGNOSIS — Z91.011 ALLERGY TO MILK PRODUCTS: ICD-10-CM

## 2019-09-05 DIAGNOSIS — I08.0 RHEUMATIC DISORDERS OF BOTH MITRAL AND AORTIC VALVES: ICD-10-CM

## 2019-09-18 NOTE — DISCHARGE NOTE ADULT - PRINCIPAL DIAGNOSIS
-- Message is from the [facility]--    Reason for Call: Patient would like to know is Dr. Iyer can send over prescription  for a cough and sore throat to [facility] [phone]    Caller Information       Type Contact Phone    09/18/2019 10:11 AM Phone (Incoming) Rama Jackson (Self) [phone] (H)          Alternative phone number: na    Turnaround time given to caller:   \"This message will be sent to [state Provider's name]. The clinical team will fulfill your request as soon as they review your message.\"     Acute on chronic congestive heart failure, unspecified congestive heart failure type

## 2019-10-04 ENCOUNTER — INPATIENT (INPATIENT)
Facility: HOSPITAL | Age: 84
LOS: 9 days | Discharge: NURSING, REHAB OR LONG-TERM CARE | End: 2019-10-14
Attending: INTERNAL MEDICINE | Admitting: INTERNAL MEDICINE
Payer: MEDICARE

## 2019-10-04 VITALS
HEART RATE: 141 BPM | DIASTOLIC BLOOD PRESSURE: 111 MMHG | SYSTOLIC BLOOD PRESSURE: 197 MMHG | RESPIRATION RATE: 21 BRPM | TEMPERATURE: 97 F | WEIGHT: 126.1 LBS | HEIGHT: 63 IN | OXYGEN SATURATION: 99 %

## 2019-10-04 DIAGNOSIS — K21.9 GASTRO-ESOPHAGEAL REFLUX DISEASE WITHOUT ESOPHAGITIS: ICD-10-CM

## 2019-10-04 DIAGNOSIS — Z95.1 PRESENCE OF AORTOCORONARY BYPASS GRAFT: Chronic | ICD-10-CM

## 2019-10-04 DIAGNOSIS — I48.91 UNSPECIFIED ATRIAL FIBRILLATION: ICD-10-CM

## 2019-10-04 DIAGNOSIS — I50.33 ACUTE ON CHRONIC DIASTOLIC (CONGESTIVE) HEART FAILURE: ICD-10-CM

## 2019-10-04 DIAGNOSIS — J44.9 CHRONIC OBSTRUCTIVE PULMONARY DISEASE, UNSPECIFIED: ICD-10-CM

## 2019-10-04 DIAGNOSIS — I25.10 ATHEROSCLEROTIC HEART DISEASE OF NATIVE CORONARY ARTERY WITHOUT ANGINA PECTORIS: ICD-10-CM

## 2019-10-04 DIAGNOSIS — F32.9 MAJOR DEPRESSIVE DISORDER, SINGLE EPISODE, UNSPECIFIED: ICD-10-CM

## 2019-10-04 DIAGNOSIS — Z96.653 PRESENCE OF ARTIFICIAL KNEE JOINT, BILATERAL: Chronic | ICD-10-CM

## 2019-10-04 DIAGNOSIS — I10 ESSENTIAL (PRIMARY) HYPERTENSION: ICD-10-CM

## 2019-10-04 DIAGNOSIS — M19.90 UNSPECIFIED OSTEOARTHRITIS, UNSPECIFIED SITE: ICD-10-CM

## 2019-10-04 LAB
ALBUMIN SERPL ELPH-MCNC: 3.6 G/DL — SIGNIFICANT CHANGE UP (ref 3.3–5)
ALP SERPL-CCNC: 92 U/L — SIGNIFICANT CHANGE UP (ref 40–120)
ALT FLD-CCNC: 48 U/L — SIGNIFICANT CHANGE UP (ref 12–78)
ANION GAP SERPL CALC-SCNC: 10 MMOL/L — SIGNIFICANT CHANGE UP (ref 5–17)
APTT BLD: 29.8 SEC — SIGNIFICANT CHANGE UP (ref 28.5–37)
AST SERPL-CCNC: 50 U/L — HIGH (ref 15–37)
BILIRUB SERPL-MCNC: 0.7 MG/DL — SIGNIFICANT CHANGE UP (ref 0.2–1.2)
BUN SERPL-MCNC: 16 MG/DL — SIGNIFICANT CHANGE UP (ref 7–23)
CALCIUM SERPL-MCNC: 8.8 MG/DL — SIGNIFICANT CHANGE UP (ref 8.5–10.1)
CHLORIDE SERPL-SCNC: 95 MMOL/L — LOW (ref 96–108)
CK MB BLD-MCNC: <1.2 % — SIGNIFICANT CHANGE UP (ref 0–3.5)
CK MB CFR SERPL CALC: <1 NG/ML — SIGNIFICANT CHANGE UP (ref 0.5–3.6)
CK SERPL-CCNC: 82 U/L — SIGNIFICANT CHANGE UP (ref 26–192)
CO2 SERPL-SCNC: 29 MMOL/L — SIGNIFICANT CHANGE UP (ref 22–31)
CREAT SERPL-MCNC: 0.66 MG/DL — SIGNIFICANT CHANGE UP (ref 0.5–1.3)
D DIMER BLD IA.RAPID-MCNC: 254 NG/ML DDU — HIGH
GLUCOSE SERPL-MCNC: 107 MG/DL — HIGH (ref 70–99)
HCT VFR BLD CALC: 34.3 % — LOW (ref 34.5–45)
HGB BLD-MCNC: 10.7 G/DL — LOW (ref 11.5–15.5)
INR BLD: 1.11 RATIO — SIGNIFICANT CHANGE UP (ref 0.88–1.16)
LACTATE SERPL-SCNC: 1.3 MMOL/L — SIGNIFICANT CHANGE UP (ref 0.7–2)
MCHC RBC-ENTMCNC: 24.5 PG — LOW (ref 27–34)
MCHC RBC-ENTMCNC: 31.2 GM/DL — LOW (ref 32–36)
MCV RBC AUTO: 78.7 FL — LOW (ref 80–100)
NRBC # BLD: 0 /100 WBCS — SIGNIFICANT CHANGE UP (ref 0–0)
NT-PROBNP SERPL-SCNC: HIGH PG/ML (ref 0–450)
PLATELET # BLD AUTO: 319 K/UL — SIGNIFICANT CHANGE UP (ref 150–400)
POTASSIUM SERPL-MCNC: 3.8 MMOL/L — SIGNIFICANT CHANGE UP (ref 3.5–5.3)
POTASSIUM SERPL-SCNC: 3.8 MMOL/L — SIGNIFICANT CHANGE UP (ref 3.5–5.3)
PROT SERPL-MCNC: 7.4 GM/DL — SIGNIFICANT CHANGE UP (ref 6–8.3)
PROTHROM AB SERPL-ACNC: 12.5 SEC — SIGNIFICANT CHANGE UP (ref 10–12.9)
RBC # BLD: 4.36 M/UL — SIGNIFICANT CHANGE UP (ref 3.8–5.2)
RBC # FLD: 15.9 % — HIGH (ref 10.3–14.5)
SODIUM SERPL-SCNC: 134 MMOL/L — LOW (ref 135–145)
TROPONIN I SERPL-MCNC: 0.02 NG/ML — SIGNIFICANT CHANGE UP (ref 0.01–0.04)
WBC # BLD: 8.34 K/UL — SIGNIFICANT CHANGE UP (ref 3.8–10.5)
WBC # FLD AUTO: 8.34 K/UL — SIGNIFICANT CHANGE UP (ref 3.8–10.5)

## 2019-10-04 PROCEDURE — 71045 X-RAY EXAM CHEST 1 VIEW: CPT | Mod: 26

## 2019-10-04 PROCEDURE — 99222 1ST HOSP IP/OBS MODERATE 55: CPT

## 2019-10-04 PROCEDURE — 71275 CT ANGIOGRAPHY CHEST: CPT | Mod: 26

## 2019-10-04 PROCEDURE — 93010 ELECTROCARDIOGRAM REPORT: CPT

## 2019-10-04 PROCEDURE — 99285 EMERGENCY DEPT VISIT HI MDM: CPT

## 2019-10-04 RX ORDER — DILTIAZEM HCL 120 MG
10 CAPSULE, EXT RELEASE 24 HR ORAL ONCE
Refills: 0 | Status: COMPLETED | OUTPATIENT
Start: 2019-10-04 | End: 2019-10-04

## 2019-10-04 RX ORDER — ACETAMINOPHEN 500 MG
650 TABLET ORAL EVERY 6 HOURS
Refills: 0 | Status: DISCONTINUED | OUTPATIENT
Start: 2019-10-04 | End: 2019-10-14

## 2019-10-04 RX ORDER — DOCUSATE SODIUM 100 MG
100 CAPSULE ORAL THREE TIMES A DAY
Refills: 0 | Status: DISCONTINUED | OUTPATIENT
Start: 2019-10-04 | End: 2019-10-14

## 2019-10-04 RX ORDER — FLUOXETINE HCL 10 MG
40 CAPSULE ORAL DAILY
Refills: 0 | Status: DISCONTINUED | OUTPATIENT
Start: 2019-10-04 | End: 2019-10-14

## 2019-10-04 RX ORDER — ASPIRIN/CALCIUM CARB/MAGNESIUM 324 MG
81 TABLET ORAL DAILY
Refills: 0 | Status: DISCONTINUED | OUTPATIENT
Start: 2019-10-05 | End: 2019-10-14

## 2019-10-04 RX ORDER — FUROSEMIDE 40 MG
40 TABLET ORAL
Refills: 0 | Status: DISCONTINUED | OUTPATIENT
Start: 2019-10-05 | End: 2019-10-10

## 2019-10-04 RX ORDER — ZOLPIDEM TARTRATE 10 MG/1
1 TABLET ORAL
Qty: 0 | Refills: 0 | DISCHARGE

## 2019-10-04 RX ORDER — RANITIDINE HYDROCHLORIDE 150 MG/1
0 TABLET, FILM COATED ORAL
Qty: 0 | Refills: 0 | DISCHARGE

## 2019-10-04 RX ORDER — BUDESONIDE AND FORMOTEROL FUMARATE DIHYDRATE 160; 4.5 UG/1; UG/1
2 AEROSOL RESPIRATORY (INHALATION)
Qty: 0 | Refills: 0 | DISCHARGE

## 2019-10-04 RX ORDER — PANTOPRAZOLE SODIUM 20 MG/1
40 TABLET, DELAYED RELEASE ORAL
Refills: 0 | Status: DISCONTINUED | OUTPATIENT
Start: 2019-10-04 | End: 2019-10-14

## 2019-10-04 RX ORDER — HEPARIN SODIUM 5000 [USP'U]/ML
2000 INJECTION INTRAVENOUS; SUBCUTANEOUS EVERY 6 HOURS
Refills: 0 | Status: DISCONTINUED | OUTPATIENT
Start: 2019-10-04 | End: 2019-10-05

## 2019-10-04 RX ORDER — LISINOPRIL 2.5 MG/1
20 TABLET ORAL DAILY
Refills: 0 | Status: DISCONTINUED | OUTPATIENT
Start: 2019-10-04 | End: 2019-10-14

## 2019-10-04 RX ORDER — DILTIAZEM HCL 120 MG
5 CAPSULE, EXT RELEASE 24 HR ORAL
Qty: 125 | Refills: 0 | Status: DISCONTINUED | OUTPATIENT
Start: 2019-10-04 | End: 2019-10-05

## 2019-10-04 RX ORDER — HEPARIN SODIUM 5000 [USP'U]/ML
INJECTION INTRAVENOUS; SUBCUTANEOUS
Qty: 25000 | Refills: 0 | Status: DISCONTINUED | OUTPATIENT
Start: 2019-10-04 | End: 2019-10-05

## 2019-10-04 RX ORDER — HEPARIN SODIUM 5000 [USP'U]/ML
4500 INJECTION INTRAVENOUS; SUBCUTANEOUS ONCE
Refills: 0 | Status: COMPLETED | OUTPATIENT
Start: 2019-10-04 | End: 2019-10-04

## 2019-10-04 RX ORDER — ALBUTEROL 90 UG/1
1 AEROSOL, METERED ORAL EVERY 4 HOURS
Refills: 0 | Status: DISCONTINUED | OUTPATIENT
Start: 2019-10-04 | End: 2019-10-14

## 2019-10-04 RX ORDER — METOPROLOL TARTRATE 50 MG
50 TABLET ORAL
Refills: 0 | Status: DISCONTINUED | OUTPATIENT
Start: 2019-10-04 | End: 2019-10-14

## 2019-10-04 RX ORDER — FUROSEMIDE 40 MG
60 TABLET ORAL ONCE
Refills: 0 | Status: COMPLETED | OUTPATIENT
Start: 2019-10-04 | End: 2019-10-04

## 2019-10-04 RX ORDER — ATORVASTATIN CALCIUM 80 MG/1
20 TABLET, FILM COATED ORAL AT BEDTIME
Refills: 0 | Status: DISCONTINUED | OUTPATIENT
Start: 2019-10-04 | End: 2019-10-14

## 2019-10-04 RX ORDER — BUDESONIDE AND FORMOTEROL FUMARATE DIHYDRATE 160; 4.5 UG/1; UG/1
2 AEROSOL RESPIRATORY (INHALATION)
Refills: 0 | Status: DISCONTINUED | OUTPATIENT
Start: 2019-10-04 | End: 2019-10-14

## 2019-10-04 RX ORDER — SODIUM CHLORIDE 9 MG/ML
500 INJECTION INTRAMUSCULAR; INTRAVENOUS; SUBCUTANEOUS ONCE
Refills: 0 | Status: COMPLETED | OUTPATIENT
Start: 2019-10-04 | End: 2019-10-04

## 2019-10-04 RX ORDER — MECLIZINE HCL 12.5 MG
0 TABLET ORAL
Qty: 0 | Refills: 0 | DISCHARGE

## 2019-10-04 RX ORDER — ASPIRIN/CALCIUM CARB/MAGNESIUM 324 MG
325 TABLET ORAL ONCE
Refills: 0 | Status: COMPLETED | OUTPATIENT
Start: 2019-10-04 | End: 2019-10-04

## 2019-10-04 RX ORDER — HEPARIN SODIUM 5000 [USP'U]/ML
4500 INJECTION INTRAVENOUS; SUBCUTANEOUS EVERY 6 HOURS
Refills: 0 | Status: DISCONTINUED | OUTPATIENT
Start: 2019-10-04 | End: 2019-10-05

## 2019-10-04 RX ADMIN — Medication 5 MG/HR: at 19:48

## 2019-10-04 RX ADMIN — HEPARIN SODIUM 1100 UNIT(S)/HR: 5000 INJECTION INTRAVENOUS; SUBCUTANEOUS at 19:32

## 2019-10-04 RX ADMIN — Medication 60 MILLIGRAM(S): at 19:30

## 2019-10-04 RX ADMIN — SODIUM CHLORIDE 500 MILLILITER(S): 9 INJECTION INTRAMUSCULAR; INTRAVENOUS; SUBCUTANEOUS at 16:55

## 2019-10-04 RX ADMIN — SODIUM CHLORIDE 500 MILLILITER(S): 9 INJECTION INTRAMUSCULAR; INTRAVENOUS; SUBCUTANEOUS at 15:48

## 2019-10-04 RX ADMIN — Medication 325 MILLIGRAM(S): at 20:19

## 2019-10-04 RX ADMIN — Medication 10 MILLIGRAM(S): at 17:02

## 2019-10-04 RX ADMIN — Medication 10 MILLIGRAM(S): at 15:49

## 2019-10-04 RX ADMIN — Medication 50 MILLIGRAM(S): at 23:23

## 2019-10-04 RX ADMIN — HEPARIN SODIUM 4500 UNIT(S): 5000 INJECTION INTRAVENOUS; SUBCUTANEOUS at 19:31

## 2019-10-04 NOTE — ED ADULT NURSE REASSESSMENT NOTE - NS ED NURSE REASSESS COMMENT FT1
Medical PA Elissa courtney. pt HR ranges b/w 107-112, PA states to keep pt at 15mg on the Cardizem drip

## 2019-10-04 NOTE — ED ADULT TRIAGE NOTE - CHIEF COMPLAINT QUOTE
pt brought by EMS from home c/o SOB x7 days ago c/o chest tightness  states that she had fluid in her lung

## 2019-10-04 NOTE — H&P ADULT - PROBLEM SELECTOR PLAN 1
Lasix 40 IVP BID - received 60 IVP in ED  Cardio consult in am - will call Lasix 40 IVP BID - received 60 IVP in ED  Cardio consult in am - (called)

## 2019-10-04 NOTE — ED ADULT NURSE NOTE - ED STAT RN HANDOFF DETAILS 2
Report endorsed to oncoming Hold RN. Safety checks compld this shift/Safety rounds completed hourly.  IV sites checked Q2+remains WDL. Meds given as ord with no s/s of adverse RXNs. Fall +skin precs in place. Any issues endorsed to oncoming RN for follow up.

## 2019-10-04 NOTE — ED PROVIDER NOTE - CLINICAL SUMMARY MEDICAL DECISION MAKING FREE TEXT BOX
pt presented with one week of sob, chest tightness and palpitations, found to be in atrial fibrillation, cardizem bolus x2 given but she remained tachy, cardizem drip ordered, chest xray shows pleural effusion as well, lasix ordered

## 2019-10-04 NOTE — H&P ADULT - ASSESSMENT
85F presents with dyspnea, bilateral pleural effusions and pulm edema.  Recent TTE shows grade 3 diastolic dysfuction with normal EF.  EKG shows new AFib - started on heparin and diltiazem drip.  Will admit to tele for cardio eval, diuresis

## 2019-10-04 NOTE — ED ADULT NURSE NOTE - NSIMPLEMENTINTERV_GEN_ALL_ED
Implemented All Fall Risk Interventions:  [place] to call system. Call bell, personal items and telephone within reach. Instruct patient to call for assistance. Room bathroom lighting operational. Non-slip footwear when patient is off stretcher. Physically safe environment: no spills, clutter or unnecessary equipment. Stretcher in lowest position, wheels locked, appropriate side rails in place. Provide visual cue, wrist band, yellow gown, etc. Monitor gait and stability. Monitor for mental status changes and reorient to person, place, and time. Review medications for side effects contributing to fall risk. Reinforce activity limits and safety measures with patient and family.

## 2019-10-04 NOTE — H&P ADULT - NSHPPHYSICALEXAM_GEN_ALL_CORE
Physical exam:  General: patient in no acute distress, resting comfortably on NC  Cardio: S1/S2 +ve, rapid rate and irregular rhythm,  Resp: rales bilaterally  GI: abdomen soft, nontender, non distended, no guarding, BS +ve x 4  Ext: 2+ pedal edema  Neuro: CN 2-12 intact, no significant motor or sensory deficits.

## 2019-10-04 NOTE — H&P ADULT - HISTORY OF PRESENT ILLNESS
84 YO F with a PMH of CAD s/p CABG, GERD, HFpEF, HTN OA s/p bilateral TKR presents to the ED for five days of shortness of breath.  Patient reports increased dyspnea on exertion and at rest during that time.  Patient reports cough with some clear to white sputum.  Patient reports that she frequently has orthopnea and has noticed her legs swelling more recently.  Patient denies sick contacts at home, stating she lives alone but her sons are currently visiting.  Denies chest pain, palpitations, nausea, vomiting, diaphroesis, dizziness, or lightheadedness.

## 2019-10-04 NOTE — H&P ADULT - NSHPLABSRESULTS_GEN_ALL_CORE
10.7   8.34  )-----------( 319      ( 04 Oct 2019 15:27 )             34.3     10-04    134<L>  |  95<L>  |  16  ----------------------------<  107<H>  3.8   |  29  |  0.66    Ca    8.8      04 Oct 2019 15:27    TPro  7.4  /  Alb  3.6  /  TBili  0.7  /  DBili  x   /  AST  50<H>  /  ALT  48  /  AlkPhos  92  10-04    PT/INR - ( 04 Oct 2019 15:27 )   PT: 12.5 sec;   INR: 1.11 ratio         PTT - ( 04 Oct 2019 15:27 )  PTT:29.8 sec  LIVER FUNCTIONS - ( 04 Oct 2019 15:27 )  Alb: 3.6 g/dL / Pro: 7.4 gm/dL / ALK PHOS: 92 U/L / ALT: 48 U/L / AST: 50 U/L / GGT: x               Home Medications:  acetaminophen 325 mg oral tablet: 2 tab(s) orally every 6 hours, As needed, Mild Pain (1 - 3) (04 Oct 2019 14:40)  albuterol 90 mcg/inh inhalation aerosol: 1 puff(s) inhaled every 4 hours, As needed, Shortness of Breath and/or Wheezing (04 Oct 2019 14:40)  Ambien 5 mg oral tablet: 1 tab(s) orally once a day (at bedtime), As Needed (04 Oct 2019 14:40)  aspirin 81 mg oral delayed release tablet: 1 tab(s) orally once a day (04 Oct 2019 14:40)  atorvastatin 20 mg oral tablet: 1 tab(s) orally once a day (at bedtime) (04 Oct 2019 14:40)  FLUoxetine 40 mg oral capsule: 1 cap(s) orally once a day (04 Oct 2019 14:40)  furosemide 40 mg oral tablet: 1 tab(s) orally once a day (04 Oct 2019 14:40)  Innerclean oral tablet: 2 tab(s) orally once a day (at bedtime), As needed, Constipation (04 Oct 2019 14:40)  lisinopril 20 mg oral tablet: 1 tab(s) orally once a day (04 Oct 2019 14:40)  meclizine:  (04 Oct 2019 20:47)  metoprolol tartrate 50 mg oral tablet: 1 tab(s) orally 2 times a day (04 Oct 2019 14:40)  pantoprazole 40 mg oral delayed release tablet: 1 tab(s) orally once a day (before a meal) (04 Oct 2019 14:40)  Symbicort 80 mcg-4.5 mcg/inh inhalation aerosol: 2 puff(s) inhaled 2 times a day (04 Oct 2019 14:40)  traMADol 50 mg oral tablet: 1 tab(s) orally every 4 hours, As needed, Mild Pain (1 - 3) (04 Oct 2019 14:40)  Zantac:  (04 Oct 2019 20:47)     1. Left ventricular ejection fraction, by visual estimation, is 55 to   60%.   2. Normal left ventricular internal cavity size.   3. Spectral Doppler shows restrictive pattern of left ventricular  myocardial filling (Grade III diastolic dysfunction).   4. Normal right ventricular size and function.   5. The left atrium is normal in size.   6. The right atrium is normal in size.   7. There is no evidence of pericardial effusion.   8. Mild to moderate mitral annular calcification.   9. Mild to moderate mitral valve regurgitation.  10. Thickening of the anterior and posterior mitral valve leaflets.  11. Structurally normal tricuspid valve, with normal leaflet excursion.  12. Moderate aorticregurgitation.  13. Structurally normal pulmonic valve, with normal leaflet excursion.  14. Estimated pulmonary artery systolic pressure is 47.9 mmHg assuming a   right atrial pressure of 10 mmHg, which is consistent with mild pulmonary   hypertension.  15. Mitral valve mean gradient is 4.9 mmHg consistent with mild mitral   stenosis.

## 2019-10-04 NOTE — ED ADULT NURSE NOTE - ED STAT RN HANDOFF DETAILS
Assuming patient's care for coverage for 30min break. Report received from RN and patient informed during rounding. Assessment available on [facility]. Will continue to monitor

## 2019-10-04 NOTE — ED ADULT NURSE REASSESSMENT NOTE - NS ED NURSE REASSESS COMMENT FT1
pt Cardizem drip titrated to 15mg/hr. Pump alerts for gardrail limits. Dr. Goodwin made aware, stating to increase drip to 15mg/hr

## 2019-10-04 NOTE — ED PROVIDER NOTE - CPE EDP SKIN NORM
You can access the [facility], offered by [facility], by registering with the following website: [email or web address]
normal...

## 2019-10-04 NOTE — ED PROVIDER NOTE - OBJECTIVE STATEMENT
85 year old female presents today biba c/o one week h/o SOB, chest tightness and intemittent palpitations (-) nausea or vomiting (-) fevers or chills (-) leg edema (-) recent travel

## 2019-10-05 LAB
APTT BLD: 32.2 SEC — SIGNIFICANT CHANGE UP (ref 28.5–37)
APTT BLD: 61.6 SEC — HIGH (ref 27.5–36.3)
HCT VFR BLD CALC: 32.6 % — LOW (ref 34.5–45)
HGB BLD-MCNC: 10.1 G/DL — LOW (ref 11.5–15.5)
MCHC RBC-ENTMCNC: 24.3 PG — LOW (ref 27–34)
MCHC RBC-ENTMCNC: 31 GM/DL — LOW (ref 32–36)
MCV RBC AUTO: 78.4 FL — LOW (ref 80–100)
NRBC # BLD: 0 /100 WBCS — SIGNIFICANT CHANGE UP (ref 0–0)
PLATELET # BLD AUTO: 313 K/UL — SIGNIFICANT CHANGE UP (ref 150–400)
RBC # BLD: 4.16 M/UL — SIGNIFICANT CHANGE UP (ref 3.8–5.2)
RBC # FLD: 15.9 % — HIGH (ref 10.3–14.5)
WBC # BLD: 8.44 K/UL — SIGNIFICANT CHANGE UP (ref 3.8–10.5)
WBC # FLD AUTO: 8.44 K/UL — SIGNIFICANT CHANGE UP (ref 3.8–10.5)

## 2019-10-05 PROCEDURE — 99223 1ST HOSP IP/OBS HIGH 75: CPT

## 2019-10-05 RX ORDER — APIXABAN 2.5 MG/1
2.5 TABLET, FILM COATED ORAL EVERY 12 HOURS
Refills: 0 | Status: DISCONTINUED | OUTPATIENT
Start: 2019-10-05 | End: 2019-10-14

## 2019-10-05 RX ORDER — IPRATROPIUM BROMIDE 0.2 MG/ML
500 SOLUTION, NON-ORAL INHALATION ONCE
Refills: 0 | Status: COMPLETED | OUTPATIENT
Start: 2019-10-05 | End: 2019-10-05

## 2019-10-05 RX ORDER — LANOLIN ALCOHOL/MO/W.PET/CERES
3 CREAM (GRAM) TOPICAL AT BEDTIME
Refills: 0 | Status: DISCONTINUED | OUTPATIENT
Start: 2019-10-05 | End: 2019-10-08

## 2019-10-05 RX ORDER — FUROSEMIDE 40 MG
40 TABLET ORAL ONCE
Refills: 0 | Status: COMPLETED | OUTPATIENT
Start: 2019-10-05 | End: 2019-10-05

## 2019-10-05 RX ORDER — DILTIAZEM HCL 120 MG
30 CAPSULE, EXT RELEASE 24 HR ORAL EVERY 6 HOURS
Refills: 0 | Status: DISCONTINUED | OUTPATIENT
Start: 2019-10-05 | End: 2019-10-14

## 2019-10-05 RX ORDER — INFLUENZA VIRUS VACCINE 15; 15; 15; 15 UG/.5ML; UG/.5ML; UG/.5ML; UG/.5ML
0.5 SUSPENSION INTRAMUSCULAR ONCE
Refills: 0 | Status: DISCONTINUED | OUTPATIENT
Start: 2019-10-05 | End: 2019-10-14

## 2019-10-05 RX ADMIN — HEPARIN SODIUM 1300 UNIT(S)/HR: 5000 INJECTION INTRAVENOUS; SUBCUTANEOUS at 03:56

## 2019-10-05 RX ADMIN — Medication 40 MILLIGRAM(S): at 19:16

## 2019-10-05 RX ADMIN — Medication 100 MILLIGRAM(S): at 05:55

## 2019-10-05 RX ADMIN — Medication 30 MILLIGRAM(S): at 18:17

## 2019-10-05 RX ADMIN — Medication 30 MILLIGRAM(S): at 23:50

## 2019-10-05 RX ADMIN — PANTOPRAZOLE SODIUM 40 MILLIGRAM(S): 20 TABLET, DELAYED RELEASE ORAL at 06:09

## 2019-10-05 RX ADMIN — ATORVASTATIN CALCIUM 20 MILLIGRAM(S): 80 TABLET, FILM COATED ORAL at 21:41

## 2019-10-05 RX ADMIN — Medication 100 MILLIGRAM(S): at 14:15

## 2019-10-05 RX ADMIN — Medication 3 MILLIGRAM(S): at 21:40

## 2019-10-05 RX ADMIN — Medication 40 MILLIGRAM(S): at 05:55

## 2019-10-05 RX ADMIN — BUDESONIDE AND FORMOTEROL FUMARATE DIHYDRATE 2 PUFF(S): 160; 4.5 AEROSOL RESPIRATORY (INHALATION) at 18:11

## 2019-10-05 RX ADMIN — LISINOPRIL 20 MILLIGRAM(S): 2.5 TABLET ORAL at 05:55

## 2019-10-05 RX ADMIN — BUDESONIDE AND FORMOTEROL FUMARATE DIHYDRATE 2 PUFF(S): 160; 4.5 AEROSOL RESPIRATORY (INHALATION) at 06:12

## 2019-10-05 RX ADMIN — ALBUTEROL 1 PUFF(S): 90 AEROSOL, METERED ORAL at 21:41

## 2019-10-05 RX ADMIN — Medication 500 MICROGRAM(S): at 19:28

## 2019-10-05 RX ADMIN — APIXABAN 2.5 MILLIGRAM(S): 2.5 TABLET, FILM COATED ORAL at 18:20

## 2019-10-05 RX ADMIN — Medication 650 MILLIGRAM(S): at 21:51

## 2019-10-05 RX ADMIN — Medication 650 MILLIGRAM(S): at 22:50

## 2019-10-05 RX ADMIN — Medication 50 MILLIGRAM(S): at 18:17

## 2019-10-05 RX ADMIN — Medication 40 MILLIGRAM(S): at 14:16

## 2019-10-05 RX ADMIN — Medication 81 MILLIGRAM(S): at 13:07

## 2019-10-05 RX ADMIN — Medication 100 MILLIGRAM(S): at 21:41

## 2019-10-05 RX ADMIN — Medication 30 MILLIGRAM(S): at 13:07

## 2019-10-05 RX ADMIN — HEPARIN SODIUM 4500 UNIT(S): 5000 INJECTION INTRAVENOUS; SUBCUTANEOUS at 03:40

## 2019-10-05 NOTE — PATIENT PROFILE ADULT - VISION (WITH CORRECTIVE LENSES IF THE PATIENT USUALLY WEARS THEM):
Partially impaired: cannot see medication labels or newsprint, but can see obstacles in path, and the surrounding layout; can count fingers at arm's length/Reading/ distance glasses

## 2019-10-05 NOTE — CONSULT NOTE ADULT - ASSESSMENT
84 YO F with a PMH of CAD s/p CABG, HFpEF, HTN, GERD, OA s/p bilateral TKR presents to the ED for five days of shortness of breath.  Patient reports increased dyspnea on exertion and at rest during that time.  Patient reports cough with some clear to white sputum.  Patient reports that she frequently has orthopnea and has noticed her legs swelling more recently.  Patient denies sick contacts at home, stating she lives alone but her sons are currently visiting.  Denies chest pain, palpitations, nausea, vomiting, diaphroesis, dizziness, or lightheadedness.   Found to be in rapid Afib with HRs 130-140s..... started on cardizem gtt with HRs improved with 70-90s  Kellie neg x 1  Echo 8/2019: LVEF 60%, DD, mild-mod MR, mod AI  ECG:  afib w RVR 137bpm; lateral ST depresisons   BNP (58370 ->8900) -> 14,000 today  Tele: afib 70-90s    ·	Acute on chronic diastolic HF, EF 55-60%, Gr III DD  ·	B/L pleural effusions  ·	CAD  ·	HTN  ·	GERD         -daily weights  -cont with supplemental O2/Duoneb   -cont asa/statin/lisinopril/metoprolol  -cont diuresis, lasix IV BID   -monitor lytes/creat with diuresis, replete as needed  -switch cardizem gtt to oral dosing  -start NOAC for Afib

## 2019-10-05 NOTE — CONSULT NOTE ADULT - SUBJECTIVE AND OBJECTIVE BOX
CARDIOLOGY CONSULT NOTE    Patient is a 85y Female with a known history of :  Chronic obstructive pulmonary disease, unspecified COPD type (J44.9)  Osteoarthritis, unspecified osteoarthritis type, unspecified site (M19.90)  Depression, unspecified depression type (F32.9)  Essential hypertension (I10)  Gastroesophageal reflux disease without esophagitis (K21.9)  Coronary artery disease involving native coronary artery of native heart without angina pectoris (I25.10)  Atrial fibrillation, unspecified type (I48.91)  Acute on chronic diastolic (congestive) heart failure (I50.33)    HPI:  84 YO F with a PMH of CAD s/p CABG, GERD, HFpEF, HTN, OA s/p bilateral TKR presents to the ED for five days of shortness of breath.  Patient reports increased dyspnea on exertion and at rest during that time.  Patient reports cough with some clear to white sputum.  Patient reports that she frequently has orthopnea and has noticed her legs swelling more recently.  Patient denies sick contacts at home, stating she lives alone but her sons are currently visiting.  Denies chest pain, palpitations, nausea, vomiting, diaphroesis, dizziness, or lightheadedness.   Found to be in rapid Afib with HRs 130-140s..... started on cardizem gtt with HRs improved with 70-90s  Kellie neg x 1  Echo 8/2019: LVEF 60%, DD, mild-mod MR, mod AI  ECG:  afib w RVR 137bpm; lateral ST depresisons   BNP 14,000    REVIEW OF SYSTEMS:    CONSTITUTIONAL: No fever, weight loss, or fatigue  EYES: No eye pain, visual disturbances, or discharge  ENMT:  No difficulty hearing, tinnitus, vertigo; No sinus or throat pain  NECK: No pain or stiffness  BREASTS: No pain, masses, or nipple discharge  RESPIRATORY: No cough, wheezing, chills or hemoptysis; + shortness of breath  CARDIOVASCULAR: No chest pain, palpitations, dizziness, or leg swelling  GASTROINTESTINAL: No abdominal or epigastric pain. No nausea, vomiting, or hematemesis; No diarrhea or constipation. No melena or hematochezia.  GENITOURINARY: No dysuria, frequency, hematuria, or incontinence  NEUROLOGICAL: No headaches, memory loss, loss of strength, numbness, or tremors  SKIN: No itching, burning, rashes, or lesions   LYMPH NODES: No enlarged glands  ENDOCRINE: No heat or cold intolerance; No hair loss  MUSCULOSKELETAL: No joint pain or swelling; No muscle, back, or extremity pain  PSYCHIATRIC: No depression, anxiety, mood swings, or difficulty sleeping  HEME/LYMPH: No easy bruising, or bleeding gums  ALLERGY AND IMMUNOLOGIC: No hives or eczema    MEDICATIONS  (STANDING):  aspirin enteric coated 81 milliGRAM(s) Oral daily  atorvastatin 20 milliGRAM(s) Oral at bedtime  buDESOnide  80 MICROgram(s)/formoterol 4.5 MICROgram(s) Inhaler 2 Puff(s) Inhalation two times a day  diltiazem Infusion 5 mG/Hr (5 mL/Hr) IV Continuous <Continuous>  docusate sodium 100 milliGRAM(s) Oral three times a day  FLUoxetine 40 milliGRAM(s) Oral daily  furosemide   Injectable 40 milliGRAM(s) IV Push two times a day  heparin  Infusion.  Unit(s)/Hr (11 mL/Hr) IV Continuous <Continuous>  influenza   Vaccine 0.5 milliLiter(s) IntraMuscular once  lisinopril 20 milliGRAM(s) Oral daily  metoprolol tartrate 50 milliGRAM(s) Oral two times a day  pantoprazole    Tablet 40 milliGRAM(s) Oral before breakfast    MEDICATIONS  (PRN):  acetaminophen   Tablet .. 650 milliGRAM(s) Oral every 6 hours PRN Temp greater or equal to 38C (100.4F), Moderate Pain (4 - 6)  ALBUTerol    90 MICROgram(s) HFA Inhaler 1 Puff(s) Inhalation every 4 hours PRN Shortness of Breath and/or Wheezing  heparin  Injectable 4500 Unit(s) IV Push every 6 hours PRN For aPTT less than 40  heparin  Injectable 2000 Unit(s) IV Push every 6 hours PRN For aPTT between 40 - 57      ALLERGIES: Talwin (Nausea)      FAMILY HISTORY:  No pertinent family history in first degree relatives      PHYSICAL EXAMINATION:  -----------------------------  T(C): 36.2 (10-05-19 @ 05:36), Max: 36.8 (10-04-19 @ 23:54)  HR: 71 (10-05-19 @ 05:36) (71 - 145)  BP: 122/83 (10-05-19 @ 05:36) (122/83 - 197/111)  RR: 20 (10-05-19 @ 05:36) (20 - 29)  SpO2: 100% (10-05-19 @ 05:36) (95% - 100%)  Wt(kg): --    10-04 @ 07:01  -  10-05 @ 07:00  --------------------------------------------------------  IN:    diltiazem Infusion: 25 mL  Total IN: 25 mL    OUT:  Total OUT: 0 mL    Total NET: 25 mL        Height (cm): 154.9 (10-05 @ 00:39)  Weight (kg): 57.2 (10-04 @ 14:07)  BMI (kg/m2): 23.8 (10-05 @ 00:39)  BSA (m2): 1.55 (10-05 @ 00:39)    Constitutional: well developed, normal appearance, well groomed, well nourished, no deformities and no acute distress.   Eyes: the conjunctiva exhibited no abnormalities and the eyelids demonstrated no xanthelasmas.   HEENT: normal oral mucosa, no oral pallor and no oral cyanosis.   Neck: normal jugular venous A waves present, normal jugular venous V waves present and no jugular venous martinez A waves.   Pulmonary: no respiratory distress, normal respiratory rhythm and effort, no accessory muscle use and lungs were clear to auscultation bilaterally.   Cardiovascular: heart rate and rhythm were normal, normal S1 and S2 and no murmur, gallop, rub, heave or thrill are present.   Abdomen: soft, non-tender, no hepato-splenomegaly and no abdominal mass palpated.   Musculoskeletal: the gait could not be assessed..   Extremities: mild LE edema  Skin: normal skin color and pigmentation, no rash, no venous stasis, no skin lesions, no skin ulcer and no xanthoma was observed.   Psychiatric: oriented to person, place, and time, the affect was normal, the mood was normal and not feeling anxious.     LABS:   --------  10-04    134<L>  |  95<L>  |  16  ----------------------------<  107<H>  3.8   |  29  |  0.66    Ca    8.8      04 Oct 2019 15:27    TPro  7.4  /  Alb  3.6  /  TBili  0.7  /  DBili  x   /  AST  50<H>  /  ALT  48  /  AlkPhos  92  10-04                         10.1   8.44  )-----------( 313      ( 05 Oct 2019 01:53 )             32.6     PT/INR - ( 04 Oct 2019 15:27 )   PT: 12.5 sec;   INR: 1.11 ratio         PTT - ( 05 Oct 2019 10:48 )  PTT:61.6 sec  10-04 @ 15:27 BNP: 35075 pg/mL    10-04 @ 15:27 CPK total:--, CKMB --, Troponin I - .019 ng/mL          RADIOLOGY:  -----------------    ECG:  afib w RVR 137bpm; lateral ST depresisons     < from: TTE Echo Doppler w/o Cont (08.23.19 @ 16:30) >   1. Left ventricular ejection fraction, by visual estimation, is 55 to   60%.   2. Normal left ventricular internal cavity size.   3. Spectral Doppler shows restrictive pattern of left ventricular  myocardial filling (Grade III diastolic dysfunction).   4. Normal right ventricular size and function.   5. The left atrium is normal in size.   6. The right atrium is normal in size.   7. There is no evidence of pericardial effusion.   8. Mild to moderate mitral annular calcification.   9. Mild to moderate mitral valve regurgitation.  10. Thickening of the anterior and posterior mitral valve leaflets.  11. Structurally normal tricuspid valve, with normal leaflet excursion.  12. Moderate aortic regurgitation.  13. Structurally normal pulmonic valve, with normal leaflet excursion.  14. Estimated pulmonary artery systolic pressure is 47.9 mmHg assuming a   right atrial pressure of 10 mmHg, which is consistent with mild pulmonary   hypertension.  15. Mitral valve mean gradient is 4.9 mmHg consistent with mild mitral   stenosis.      < end of copied text >      < from: CT Angio Chest w/ IV Cont (10.04.19 @ 17:46) >  IMPRESSION:  No filling defects identified within the segmental pulmonary   arterial tree to indicate pulmonary embolism. Bilateral pleural   effusions. Atelectatic changes identified bilaterally. Hazy airspace   opacity identified within the aerated portions of the bilateral lung   parenchyma suggesting pulmonary edema.    < end of copied text >

## 2019-10-06 DIAGNOSIS — I48.91 UNSPECIFIED ATRIAL FIBRILLATION: ICD-10-CM

## 2019-10-06 LAB
HCT VFR BLD CALC: 32 % — LOW (ref 34.5–45)
HGB BLD-MCNC: 9.9 G/DL — LOW (ref 11.5–15.5)
MCHC RBC-ENTMCNC: 24.3 PG — LOW (ref 27–34)
MCHC RBC-ENTMCNC: 30.9 GM/DL — LOW (ref 32–36)
MCV RBC AUTO: 78.6 FL — LOW (ref 80–100)
NRBC # BLD: 0 /100 WBCS — SIGNIFICANT CHANGE UP (ref 0–0)
PLATELET # BLD AUTO: 306 K/UL — SIGNIFICANT CHANGE UP (ref 150–400)
RBC # BLD: 4.07 M/UL — SIGNIFICANT CHANGE UP (ref 3.8–5.2)
RBC # FLD: 15.9 % — HIGH (ref 10.3–14.5)
WBC # BLD: 7.18 K/UL — SIGNIFICANT CHANGE UP (ref 3.8–10.5)
WBC # FLD AUTO: 7.18 K/UL — SIGNIFICANT CHANGE UP (ref 3.8–10.5)

## 2019-10-06 PROCEDURE — 36481 INSERTION OF CATHETER VEIN: CPT

## 2019-10-06 PROCEDURE — 99232 SBSQ HOSP IP/OBS MODERATE 35: CPT

## 2019-10-06 PROCEDURE — 76937 US GUIDE VASCULAR ACCESS: CPT | Mod: 26

## 2019-10-06 RX ORDER — IPRATROPIUM/ALBUTEROL SULFATE 18-103MCG
3 AEROSOL WITH ADAPTER (GRAM) INHALATION EVERY 6 HOURS
Refills: 0 | Status: DISCONTINUED | OUTPATIENT
Start: 2019-10-06 | End: 2019-10-08

## 2019-10-06 RX ORDER — FUROSEMIDE 40 MG
40 TABLET ORAL ONCE
Refills: 0 | Status: COMPLETED | OUTPATIENT
Start: 2019-10-06 | End: 2019-10-06

## 2019-10-06 RX ORDER — FUROSEMIDE 40 MG
40 TABLET ORAL DAILY
Refills: 0 | Status: DISCONTINUED | OUTPATIENT
Start: 2019-10-06 | End: 2019-10-07

## 2019-10-06 RX ADMIN — APIXABAN 2.5 MILLIGRAM(S): 2.5 TABLET, FILM COATED ORAL at 18:02

## 2019-10-06 RX ADMIN — Medication 30 MILLIGRAM(S): at 11:47

## 2019-10-06 RX ADMIN — Medication 30 MILLIGRAM(S): at 06:34

## 2019-10-06 RX ADMIN — Medication 100 MILLIGRAM(S): at 14:47

## 2019-10-06 RX ADMIN — Medication 40 MILLIGRAM(S): at 14:46

## 2019-10-06 RX ADMIN — Medication 3 MILLIGRAM(S): at 22:57

## 2019-10-06 RX ADMIN — Medication 50 MILLIGRAM(S): at 18:02

## 2019-10-06 RX ADMIN — BUDESONIDE AND FORMOTEROL FUMARATE DIHYDRATE 2 PUFF(S): 160; 4.5 AEROSOL RESPIRATORY (INHALATION) at 18:02

## 2019-10-06 RX ADMIN — ALBUTEROL 1 PUFF(S): 90 AEROSOL, METERED ORAL at 23:01

## 2019-10-06 RX ADMIN — Medication 81 MILLIGRAM(S): at 11:47

## 2019-10-06 RX ADMIN — APIXABAN 2.5 MILLIGRAM(S): 2.5 TABLET, FILM COATED ORAL at 06:34

## 2019-10-06 RX ADMIN — ALBUTEROL 1 PUFF(S): 90 AEROSOL, METERED ORAL at 06:48

## 2019-10-06 RX ADMIN — Medication 100 MILLIGRAM(S): at 22:57

## 2019-10-06 RX ADMIN — BUDESONIDE AND FORMOTEROL FUMARATE DIHYDRATE 2 PUFF(S): 160; 4.5 AEROSOL RESPIRATORY (INHALATION) at 06:38

## 2019-10-06 RX ADMIN — Medication 50 MILLIGRAM(S): at 06:36

## 2019-10-06 RX ADMIN — Medication 3 MILLILITER(S): at 13:04

## 2019-10-06 RX ADMIN — PANTOPRAZOLE SODIUM 40 MILLIGRAM(S): 20 TABLET, DELAYED RELEASE ORAL at 06:34

## 2019-10-06 RX ADMIN — Medication 650 MILLIGRAM(S): at 23:57

## 2019-10-06 RX ADMIN — Medication 30 MILLIGRAM(S): at 18:02

## 2019-10-06 RX ADMIN — Medication 40 MILLIGRAM(S): at 18:03

## 2019-10-06 RX ADMIN — Medication 30 MILLIGRAM(S): at 23:03

## 2019-10-06 RX ADMIN — Medication 40 MILLIGRAM(S): at 11:47

## 2019-10-06 RX ADMIN — Medication 40 MILLIGRAM(S): at 07:11

## 2019-10-06 RX ADMIN — Medication 100 MILLIGRAM(S): at 06:34

## 2019-10-06 RX ADMIN — Medication 650 MILLIGRAM(S): at 22:57

## 2019-10-06 RX ADMIN — Medication 3 MILLILITER(S): at 18:02

## 2019-10-06 RX ADMIN — LISINOPRIL 20 MILLIGRAM(S): 2.5 TABLET ORAL at 06:34

## 2019-10-06 RX ADMIN — ATORVASTATIN CALCIUM 20 MILLIGRAM(S): 80 TABLET, FILM COATED ORAL at 22:57

## 2019-10-06 NOTE — PROGRESS NOTE ADULT - ASSESSMENT
86 YO F with a PMH of CAD s/p CABG, HFpEF, HTN, GERD, OA s/p bilateral TKR presents to the ED for five days of shortness of breath.  Patient reports increased dyspnea on exertion and at rest during that time.  Patient reports cough with some clear to white sputum.  Patient reports that she frequently has orthopnea and has noticed her legs swelling more recently.  Patient denies sick contacts at home, stating she lives alone but her sons are currently visiting.  Denies chest pain, palpitations, nausea, vomiting, diaphroesis, dizziness, or lightheadedness.   Found to be in rapid Afib with HRs 130-140s..... started on cardizem gtt with HRs improved with 70-90s  Kellie neg x 1  Echo 8/2019: LVEF 60%, DD, mild-mod MR, mod AI  ECG:  afib w RVR 137bpm; lateral ST depresisons   BNP (27758 ->8900) -> 14,000 today    Improved this AM but still SOB.  HRs afib 80s.    ·	Acute on chronic diastolic HF, EF 55-60%, Gr III DD  ·	B/L pleural effusions  ·	CAD  ·	HTN  ·	GERD     -daily weights  -cont with supplemental O2/Duoneb   -cont asa/statin/lisinopril/metoprolol  -cont diuresis, lasix IV BID; will give additional dose now ... weights unchanged   -monitor lytes/creat with diuresis, replete as needed  -HRs stable on oral cardizem  -cont NOAC for AC for Afib 84 YO F with a PMH of CAD s/p CABG, HFpEF, HTN, GERD, OA s/p bilateral TKR presents to the ED for five days of shortness of breath.  Patient reports increased dyspnea on exertion and at rest during that time.  Patient reports cough with some clear to white sputum.  Patient reports that she frequently has orthopnea and has noticed her legs swelling more recently.  Patient denies sick contacts at home, stating she lives alone but her sons are currently visiting.  Denies chest pain, palpitations, nausea, vomiting, diaphroesis, dizziness, or lightheadedness.   Found to be in rapid Afib with HRs 130-140s..... started on cardizem gtt with HRs improved with 70-90s  Kellie neg x 1  Echo 8/2019: LVEF 60%, DD, mild-mod MR, mod AI  ECG:  afib w RVR 137bpm; lateral ST depresisons   BNP (74813 ->8900) -> 14,000 today    Improved this AM but still SOB.  Has no IV access and not been receiving IV lasix.. being switched to PO.  HRs afib 80s.    ·	Acute on chronic diastolic HF, EF 55-60%, Gr III DD  ·	B/L pleural effusions  ·	CAD  ·	HTN  ·	GERD     -daily weights  -cont with supplemental O2/Duoneb   -cont asa/statin/lisinopril/metoprolol  -cont diuresis, lasix IV BID; needs IV as remains SOB and not receiving IV dosing (getting PO).    -monitor lytes/creat with diuresis, replete as needed  -HRs stable on oral cardizem  -cont NOAC for AC for Afib

## 2019-10-06 NOTE — PROGRESS NOTE ADULT - SUBJECTIVE AND OBJECTIVE BOX
84 yo lady with chronic CHF slowly improving.      Vital Signs Last 24 Hrs  T(C): 36.2 (06 Oct 2019 10:41), Max: 36.5 (06 Oct 2019 05:15)  T(F): 97.2 (06 Oct 2019 10:41), Max: 97.7 (06 Oct 2019 05:15)  HR: 66 (06 Oct 2019 10:41) (66 - 92)  BP: 127/61 (06 Oct 2019 10:41) (127/61 - 143/65)  BP(mean): --  RR: 16 (06 Oct 2019 10:41) (16 - 17)  SpO2: 100% (06 Oct 2019 10:41) (96% - 100%)                          9.9    7.18  )-----------( 306      ( 06 Oct 2019 07:43 )             32.0     MEDICATIONS  (STANDING):  ALBUTerol/ipratropium for Nebulization 3 milliLiter(s) Nebulizer every 6 hours  apixaban 2.5 milliGRAM(s) Oral every 12 hours  aspirin enteric coated 81 milliGRAM(s) Oral daily  atorvastatin 20 milliGRAM(s) Oral at bedtime  buDESOnide  80 MICROgram(s)/formoterol 4.5 MICROgram(s) Inhaler 2 Puff(s) Inhalation two times a day  diltiazem    Tablet 30 milliGRAM(s) Oral every 6 hours  docusate sodium 100 milliGRAM(s) Oral three times a day  FLUoxetine 40 milliGRAM(s) Oral daily  furosemide   Injectable 40 milliGRAM(s) IV Push two times a day  influenza   Vaccine 0.5 milliLiter(s) IntraMuscular once  lisinopril 20 milliGRAM(s) Oral daily  metoprolol tartrate 50 milliGRAM(s) Oral two times a day  pantoprazole    Tablet 40 milliGRAM(s) Oral before breakfast    MEDICATIONS  (PRN):  acetaminophen   Tablet .. 650 milliGRAM(s) Oral every 6 hours PRN Temp greater or equal to 38C (100.4F), Moderate Pain (4 - 6)  ALBUTerol    90 MICROgram(s) HFA Inhaler 1 Puff(s) Inhalation every 4 hours PRN Shortness of Breath and/or Wheezing  melatonin 3 milliGRAM(s) Oral at bedtime PRN Insomnia

## 2019-10-06 NOTE — PROGRESS NOTE ADULT - SUBJECTIVE AND OBJECTIVE BOX
CARDIOLOGY CONSULT NOTE    Patient is a 85y Female with a known history of :  Chronic obstructive pulmonary disease, unspecified COPD type (J44.9)  Osteoarthritis, unspecified osteoarthritis type, unspecified site (M19.90)  Depression, unspecified depression type (F32.9)  Essential hypertension (I10)  Gastroesophageal reflux disease without esophagitis (K21.9)  Coronary artery disease involving native coronary artery of native heart without angina pectoris (I25.10)  Atrial fibrillation, unspecified type (I48.91)  Acute on chronic diastolic (congestive) heart failure (I50.33)    HPI:  84 YO F with a PMH of CAD s/p CABG, GERD, HFpEF, HTN, OA s/p bilateral TKR presents to the ED for five days of shortness of breath.  Patient reports increased dyspnea on exertion and at rest during that time.  Patient reports cough with some clear to white sputum.  Patient reports that she frequently has orthopnea and has noticed her legs swelling more recently.  Patient denies sick contacts at home, stating she lives alone but her sons are currently visiting.  Denies chest pain, palpitations, nausea, vomiting, diaphroesis, dizziness, or lightheadedness.   Found to be in rapid Afib with HRs 130-140s..... started on cardizem gtt with HRs improved with 70-90s  Kellie neg x 1  Echo 8/2019: LVEF 60%, DD, mild-mod MR, mod AI  ECG:  afib w RVR 137bpm; lateral ST depresisons   BNP 14,000    Improved this AM but still SOB.  HRs afib 80s.    REVIEW OF SYSTEMS:    CONSTITUTIONAL: No fever, weight loss, or fatigue  EYES: No eye pain, visual disturbances, or discharge  ENMT:  No difficulty hearing, tinnitus, vertigo; No sinus or throat pain  NECK: No pain or stiffness  BREASTS: No pain, masses, or nipple discharge  RESPIRATORY: No cough, wheezing, chills or hemoptysis; + shortness of breath  CARDIOVASCULAR: No chest pain, palpitations, dizziness, or leg swelling  GASTROINTESTINAL: No abdominal or epigastric pain. No nausea, vomiting, or hematemesis; No diarrhea or constipation. No melena or hematochezia.  GENITOURINARY: No dysuria, frequency, hematuria, or incontinence  NEUROLOGICAL: No headaches, memory loss, loss of strength, numbness, or tremors  SKIN: No itching, burning, rashes, or lesions   LYMPH NODES: No enlarged glands  ENDOCRINE: No heat or cold intolerance; No hair loss  MUSCULOSKELETAL: No joint pain or swelling; No muscle, back, or extremity pain  PSYCHIATRIC: No depression, anxiety, mood swings, or difficulty sleeping  HEME/LYMPH: No easy bruising, or bleeding gums  ALLERGY AND IMMUNOLOGIC: No hives or eczema    MEDICATIONS  (STANDING):  ALBUTerol/ipratropium for Nebulization 3 milliLiter(s) Nebulizer every 6 hours  apixaban 2.5 milliGRAM(s) Oral every 12 hours  aspirin enteric coated 81 milliGRAM(s) Oral daily  atorvastatin 20 milliGRAM(s) Oral at bedtime  buDESOnide  80 MICROgram(s)/formoterol 4.5 MICROgram(s) Inhaler 2 Puff(s) Inhalation two times a day  diltiazem    Tablet 30 milliGRAM(s) Oral every 6 hours  docusate sodium 100 milliGRAM(s) Oral three times a day  FLUoxetine 40 milliGRAM(s) Oral daily  furosemide   Injectable 40 milliGRAM(s) IV Push two times a day  influenza   Vaccine 0.5 milliLiter(s) IntraMuscular once  lisinopril 20 milliGRAM(s) Oral daily  metoprolol tartrate 50 milliGRAM(s) Oral two times a day  pantoprazole    Tablet 40 milliGRAM(s) Oral before breakfast    MEDICATIONS  (PRN):  acetaminophen   Tablet .. 650 milliGRAM(s) Oral every 6 hours PRN Temp greater or equal to 38C (100.4F), Moderate Pain (4 - 6)  ALBUTerol    90 MICROgram(s) HFA Inhaler 1 Puff(s) Inhalation every 4 hours PRN Shortness of Breath and/or Wheezing  melatonin 3 milliGRAM(s) Oral at bedtime PRN Insomnia        ALLERGIES: Talwin (Nausea)      FAMILY HISTORY:  No pertinent family history in first degree relatives      PHYSICAL EXAMINATION:  -----------------------------  T(C): 36.2 (10-05-19 @ 05:36), Max: 36.8 (10-04-19 @ 23:54)  HR: 71 (10-05-19 @ 05:36) (71 - 145)  BP: 122/83 (10-05-19 @ 05:36) (122/83 - 197/111)  RR: 20 (10-05-19 @ 05:36) (20 - 29)  SpO2: 100% (10-05-19 @ 05:36) (95% - 100%)  Wt(kg): --    10-04 @ 07:01  -  10-05 @ 07:00  --------------------------------------------------------  IN:    diltiazem Infusion: 25 mL  Total IN: 25 mL    OUT:  Total OUT: 0 mL    Total NET: 25 mL        Height (cm): 154.9 (10-05 @ 00:39)  Weight (kg): 57.2 (10-04 @ 14:07)  BMI (kg/m2): 23.8 (10-05 @ 00:39)  BSA (m2): 1.55 (10-05 @ 00:39)    Constitutional: mild resp distress  Eyes: the conjunctiva exhibited no abnormalities and the eyelids demonstrated no xanthelasmas.   HEENT: normal oral mucosa, no oral pallor and no oral cyanosis.   Neck: normal jugular venous A waves present, normal jugular venous V waves present and no jugular venous martinez A waves.   Pulmonary: coarse BS; diminished at bases  Cardiovascular: irreg irreg 2/6 SM  Abdomen: soft, non-tender, no hepato-splenomegaly and no abdominal mass palpated.   Musculoskeletal: the gait could not be assessed..   Extremities: mild LE edema  Skin: normal skin color and pigmentation, no rash, no venous stasis, no skin lesions, no skin ulcer and no xanthoma was observed.   Psychiatric: oriented to person, place, and time, the affect was normal, the mood was normal and not feeling anxious.     LABS:   --------                        9.9    7.18  )-----------( 306      ( 06 Oct 2019 07:43 )             32.0     10-04    134<L>  |  95<L>  |  16  ----------------------------<  107<H>  3.8   |  29  |  0.66    Ca    8.8      04 Oct 2019 15:27    TPro  7.4  /  Alb  3.6  /  TBili  0.7  /  DBili  x   /  AST  50<H>  /  ALT  48  /  AlkPhos  92  10-04        RADIOLOGY:  -----------------    ECG:  afib w RVR 137bpm; lateral ST depresisons     < from: TTE Echo Doppler w/o Cont (08.23.19 @ 16:30) >   1. Left ventricular ejection fraction, by visual estimation, is 55 to   60%.   2. Normal left ventricular internal cavity size.   3. Spectral Doppler shows restrictive pattern of left ventricular  myocardial filling (Grade III diastolic dysfunction).   4. Normal right ventricular size and function.   5. The left atrium is normal in size.   6. The right atrium is normal in size.   7. There is no evidence of pericardial effusion.   8. Mild to moderate mitral annular calcification.   9. Mild to moderate mitral valve regurgitation.  10. Thickening of the anterior and posterior mitral valve leaflets.  11. Structurally normal tricuspid valve, with normal leaflet excursion.  12. Moderate aortic regurgitation.  13. Structurally normal pulmonic valve, with normal leaflet excursion.  14. Estimated pulmonary artery systolic pressure is 47.9 mmHg assuming a   right atrial pressure of 10 mmHg, which is consistent with mild pulmonary   hypertension.  15. Mitral valve mean gradient is 4.9 mmHg consistent with mild mitral   stenosis.      < end of copied text >      < from: CT Angio Chest w/ IV Cont (10.04.19 @ 17:46) >  IMPRESSION:  No filling defects identified within the segmental pulmonary   arterial tree to indicate pulmonary embolism. Bilateral pleural   effusions. Atelectatic changes identified bilaterally. Hazy airspace   opacity identified within the aerated portions of the bilateral lung   parenchyma suggesting pulmonary edema.    < end of copied text > CARDIOLOGY CONSULT NOTE    Patient is a 85y Female with a known history of :  Chronic obstructive pulmonary disease, unspecified COPD type (J44.9)  Osteoarthritis, unspecified osteoarthritis type, unspecified site (M19.90)  Depression, unspecified depression type (F32.9)  Essential hypertension (I10)  Gastroesophageal reflux disease without esophagitis (K21.9)  Coronary artery disease involving native coronary artery of native heart without angina pectoris (I25.10)  Atrial fibrillation, unspecified type (I48.91)  Acute on chronic diastolic (congestive) heart failure (I50.33)    HPI:  86 YO F with a PMH of CAD s/p CABG, GERD, HFpEF, HTN, OA s/p bilateral TKR presents to the ED for five days of shortness of breath.  Patient reports increased dyspnea on exertion and at rest during that time.  Patient reports cough with some clear to white sputum.  Patient reports that she frequently has orthopnea and has noticed her legs swelling more recently.  Patient denies sick contacts at home, stating she lives alone but her sons are currently visiting.  Denies chest pain, palpitations, nausea, vomiting, diaphroesis, dizziness, or lightheadedness.   Found to be in rapid Afib with HRs 130-140s..... started on cardizem gtt with HRs improved with 70-90s  Kellie neg x 1  Echo 8/2019: LVEF 60%, DD, mild-mod MR, mod AI  ECG:  afib w RVR 137bpm; lateral ST depresisons   BNP 14,000    Improved this AM but still SOB.  Has no IV access and not been receiving IV lasix.. being switched to PO.  HRs afib 80s.    REVIEW OF SYSTEMS:    CONSTITUTIONAL: No fever, weight loss, or fatigue  EYES: No eye pain, visual disturbances, or discharge  ENMT:  No difficulty hearing, tinnitus, vertigo; No sinus or throat pain  NECK: No pain or stiffness  BREASTS: No pain, masses, or nipple discharge  RESPIRATORY: No cough, wheezing, chills or hemoptysis; + shortness of breath  CARDIOVASCULAR: No chest pain, palpitations, dizziness, or leg swelling  GASTROINTESTINAL: No abdominal or epigastric pain. No nausea, vomiting, or hematemesis; No diarrhea or constipation. No melena or hematochezia.  GENITOURINARY: No dysuria, frequency, hematuria, or incontinence  NEUROLOGICAL: No headaches, memory loss, loss of strength, numbness, or tremors  SKIN: No itching, burning, rashes, or lesions   LYMPH NODES: No enlarged glands  ENDOCRINE: No heat or cold intolerance; No hair loss  MUSCULOSKELETAL: No joint pain or swelling; No muscle, back, or extremity pain  PSYCHIATRIC: No depression, anxiety, mood swings, or difficulty sleeping  HEME/LYMPH: No easy bruising, or bleeding gums  ALLERGY AND IMMUNOLOGIC: No hives or eczema    MEDICATIONS  (STANDING):  ALBUTerol/ipratropium for Nebulization 3 milliLiter(s) Nebulizer every 6 hours  apixaban 2.5 milliGRAM(s) Oral every 12 hours  aspirin enteric coated 81 milliGRAM(s) Oral daily  atorvastatin 20 milliGRAM(s) Oral at bedtime  buDESOnide  80 MICROgram(s)/formoterol 4.5 MICROgram(s) Inhaler 2 Puff(s) Inhalation two times a day  diltiazem    Tablet 30 milliGRAM(s) Oral every 6 hours  docusate sodium 100 milliGRAM(s) Oral three times a day  FLUoxetine 40 milliGRAM(s) Oral daily  furosemide   Injectable 40 milliGRAM(s) IV Push two times a day  influenza   Vaccine 0.5 milliLiter(s) IntraMuscular once  lisinopril 20 milliGRAM(s) Oral daily  metoprolol tartrate 50 milliGRAM(s) Oral two times a day  pantoprazole    Tablet 40 milliGRAM(s) Oral before breakfast    MEDICATIONS  (PRN):  acetaminophen   Tablet .. 650 milliGRAM(s) Oral every 6 hours PRN Temp greater or equal to 38C (100.4F), Moderate Pain (4 - 6)  ALBUTerol    90 MICROgram(s) HFA Inhaler 1 Puff(s) Inhalation every 4 hours PRN Shortness of Breath and/or Wheezing  melatonin 3 milliGRAM(s) Oral at bedtime PRN Insomnia        ALLERGIES: Talwin (Nausea)      FAMILY HISTORY:  No pertinent family history in first degree relatives      PHYSICAL EXAMINATION:  -----------------------------  T(C): 36.2 (10-05-19 @ 05:36), Max: 36.8 (10-04-19 @ 23:54)  HR: 71 (10-05-19 @ 05:36) (71 - 145)  BP: 122/83 (10-05-19 @ 05:36) (122/83 - 197/111)  RR: 20 (10-05-19 @ 05:36) (20 - 29)  SpO2: 100% (10-05-19 @ 05:36) (95% - 100%)  Wt(kg): --    10-04 @ 07:01  -  10-05 @ 07:00  --------------------------------------------------------  IN:    diltiazem Infusion: 25 mL  Total IN: 25 mL    OUT:  Total OUT: 0 mL    Total NET: 25 mL        Height (cm): 154.9 (10-05 @ 00:39)  Weight (kg): 57.2 (10-04 @ 14:07)  BMI (kg/m2): 23.8 (10-05 @ 00:39)  BSA (m2): 1.55 (10-05 @ 00:39)    Constitutional: mild resp distress  Eyes: the conjunctiva exhibited no abnormalities and the eyelids demonstrated no xanthelasmas.   HEENT: normal oral mucosa, no oral pallor and no oral cyanosis.   Neck: normal jugular venous A waves present, normal jugular venous V waves present and no jugular venous martinez A waves.   Pulmonary: coarse BS; diminished at bases  Cardiovascular: irreg irreg 2/6 SM  Abdomen: soft, non-tender, no hepato-splenomegaly and no abdominal mass palpated.   Musculoskeletal: the gait could not be assessed..   Extremities: mild LE edema  Skin: normal skin color and pigmentation, no rash, no venous stasis, no skin lesions, no skin ulcer and no xanthoma was observed.   Psychiatric: oriented to person, place, and time, the affect was normal, the mood was normal and not feeling anxious.     LABS:   --------                        9.9    7.18  )-----------( 306      ( 06 Oct 2019 07:43 )             32.0     10-04    134<L>  |  95<L>  |  16  ----------------------------<  107<H>  3.8   |  29  |  0.66    Ca    8.8      04 Oct 2019 15:27    TPro  7.4  /  Alb  3.6  /  TBili  0.7  /  DBili  x   /  AST  50<H>  /  ALT  48  /  AlkPhos  92  10-04        RADIOLOGY:  -----------------    ECG:  afib w RVR 137bpm; lateral ST depresisons     < from: TTE Echo Doppler w/o Cont (08.23.19 @ 16:30) >   1. Left ventricular ejection fraction, by visual estimation, is 55 to   60%.   2. Normal left ventricular internal cavity size.   3. Spectral Doppler shows restrictive pattern of left ventricular  myocardial filling (Grade III diastolic dysfunction).   4. Normal right ventricular size and function.   5. The left atrium is normal in size.   6. The right atrium is normal in size.   7. There is no evidence of pericardial effusion.   8. Mild to moderate mitral annular calcification.   9. Mild to moderate mitral valve regurgitation.  10. Thickening of the anterior and posterior mitral valve leaflets.  11. Structurally normal tricuspid valve, with normal leaflet excursion.  12. Moderate aortic regurgitation.  13. Structurally normal pulmonic valve, with normal leaflet excursion.  14. Estimated pulmonary artery systolic pressure is 47.9 mmHg assuming a   right atrial pressure of 10 mmHg, which is consistent with mild pulmonary   hypertension.  15. Mitral valve mean gradient is 4.9 mmHg consistent with mild mitral   stenosis.      < end of copied text >      < from: CT Angio Chest w/ IV Cont (10.04.19 @ 17:46) >  IMPRESSION:  No filling defects identified within the segmental pulmonary   arterial tree to indicate pulmonary embolism. Bilateral pleural   effusions. Atelectatic changes identified bilaterally. Hazy airspace   opacity identified within the aerated portions of the bilateral lung   parenchyma suggesting pulmonary edema.    < end of copied text >

## 2019-10-06 NOTE — CHART NOTE - NSCHARTNOTEFT_GEN_A_CORE
Medicine Hospitalist PA    Requested by RN to place an IV heplock in patient. As per RN pt is a difficult stick. Placed IV heplock in right forearm. #20g in right antecub using ultrasound guidance. IV flushed and secured. RN aware.

## 2019-10-07 DIAGNOSIS — I50.9 HEART FAILURE, UNSPECIFIED: ICD-10-CM

## 2019-10-07 LAB
HCT VFR BLD CALC: 34.9 % — SIGNIFICANT CHANGE UP (ref 34.5–45)
HGB BLD-MCNC: 10.8 G/DL — LOW (ref 11.5–15.5)
MCHC RBC-ENTMCNC: 23.8 PG — LOW (ref 27–34)
MCHC RBC-ENTMCNC: 30.9 GM/DL — LOW (ref 32–36)
MCV RBC AUTO: 77 FL — LOW (ref 80–100)
NRBC # BLD: 0 /100 WBCS — SIGNIFICANT CHANGE UP (ref 0–0)
PLATELET # BLD AUTO: 341 K/UL — SIGNIFICANT CHANGE UP (ref 150–400)
RBC # BLD: 4.53 M/UL — SIGNIFICANT CHANGE UP (ref 3.8–5.2)
RBC # FLD: 16 % — HIGH (ref 10.3–14.5)
WBC # BLD: 8.12 K/UL — SIGNIFICANT CHANGE UP (ref 3.8–10.5)
WBC # FLD AUTO: 8.12 K/UL — SIGNIFICANT CHANGE UP (ref 3.8–10.5)

## 2019-10-07 PROCEDURE — 99232 SBSQ HOSP IP/OBS MODERATE 35: CPT

## 2019-10-07 RX ADMIN — Medication 3 MILLILITER(S): at 00:02

## 2019-10-07 RX ADMIN — PANTOPRAZOLE SODIUM 40 MILLIGRAM(S): 20 TABLET, DELAYED RELEASE ORAL at 06:00

## 2019-10-07 RX ADMIN — Medication 100 MILLIGRAM(S): at 13:19

## 2019-10-07 RX ADMIN — BUDESONIDE AND FORMOTEROL FUMARATE DIHYDRATE 2 PUFF(S): 160; 4.5 AEROSOL RESPIRATORY (INHALATION) at 18:08

## 2019-10-07 RX ADMIN — APIXABAN 2.5 MILLIGRAM(S): 2.5 TABLET, FILM COATED ORAL at 18:03

## 2019-10-07 RX ADMIN — Medication 3 MILLIGRAM(S): at 21:13

## 2019-10-07 RX ADMIN — Medication 3 MILLILITER(S): at 23:54

## 2019-10-07 RX ADMIN — BUDESONIDE AND FORMOTEROL FUMARATE DIHYDRATE 2 PUFF(S): 160; 4.5 AEROSOL RESPIRATORY (INHALATION) at 05:57

## 2019-10-07 RX ADMIN — ATORVASTATIN CALCIUM 20 MILLIGRAM(S): 80 TABLET, FILM COATED ORAL at 21:13

## 2019-10-07 RX ADMIN — Medication 3 MILLILITER(S): at 12:07

## 2019-10-07 RX ADMIN — Medication 81 MILLIGRAM(S): at 13:19

## 2019-10-07 RX ADMIN — Medication 3 MILLILITER(S): at 05:51

## 2019-10-07 RX ADMIN — Medication 100 MILLIGRAM(S): at 21:13

## 2019-10-07 RX ADMIN — Medication 40 MILLIGRAM(S): at 05:55

## 2019-10-07 RX ADMIN — LISINOPRIL 20 MILLIGRAM(S): 2.5 TABLET ORAL at 05:54

## 2019-10-07 RX ADMIN — Medication 40 MILLIGRAM(S): at 18:04

## 2019-10-07 RX ADMIN — Medication 50 MILLIGRAM(S): at 05:54

## 2019-10-07 RX ADMIN — Medication 3 MILLILITER(S): at 18:30

## 2019-10-07 RX ADMIN — Medication 50 MILLIGRAM(S): at 18:03

## 2019-10-07 RX ADMIN — Medication 40 MILLIGRAM(S): at 15:05

## 2019-10-07 RX ADMIN — Medication 30 MILLIGRAM(S): at 05:57

## 2019-10-07 RX ADMIN — APIXABAN 2.5 MILLIGRAM(S): 2.5 TABLET, FILM COATED ORAL at 05:54

## 2019-10-07 RX ADMIN — Medication 100 MILLIGRAM(S): at 05:54

## 2019-10-07 RX ADMIN — Medication 30 MILLIGRAM(S): at 13:18

## 2019-10-07 RX ADMIN — Medication 30 MILLIGRAM(S): at 18:03

## 2019-10-07 NOTE — PHYSICAL THERAPY INITIAL EVALUATION ADULT - CRITERIA FOR SKILLED THERAPEUTIC INTERVENTIONS
impairments found/functional limitations in following categories/risk reduction/prevention/rehab potential/anticipated discharge recommendation/therapy frequency/predicted duration of therapy intervention

## 2019-10-07 NOTE — PROGRESS NOTE ADULT - ASSESSMENT
86 YO F with a PMH of CAD s/p CABG, HFpEF, HTN, GERD, OA s/p bilateral TKR presents to the ED for five days of shortness of breath.  Patient reports increased dyspnea on exertion and at rest during that time.  Patient reports cough with some clear to white sputum, also reports that she frequently has orthopnea and has noticed her legs swelling more recently.  Patient denies sick contacts at home, stating she lives alone but her sons are currently visiting.  Denies chest pain, palpitations, nausea, vomiting, diaphoresis, dizziness, or lightheadedness.   Found to be in rapid Afib with HRs 130-140s, started on Cardizem gtt with HRs improved with 70-90s  Kellie neg x 1  Echo 8/2019: LVEF 60%, DD, mild-mod MR, mod AI, mild Ms/pHTN  ECG on admit:  afib w RVR 137bpm; lateral ST depressions   BNP (37057 ->8900) -> 14,000 on this admission  Tele: afib 70-90s    ·	Acute on chronic diastolic HF, EF 55-60%, Gr III DD  ·	Atrial fibrillation with RVR  ·	B/L pleural effusions  ·	CAD Hx, s/p CABG  ·	HTN  ·	GERD    Plan  -Cont with tele  -cont with IV Lasix 40 Bid for now  -Monitor renal function/electrolytes/daily weights  -Cont with Cardizem for rate control along with bbl  -cont with supplemental O2/Duoneb   -cont asa/statin/lisinopril/metoprolol  -monitor lytes/creat with diuresis, replete as needed  -Cont Eliquis for Afib 84 YO F with a PMH of CAD s/p CABG, HFpEF, HTN, GERD, OA s/p bilateral TKR presents to the ED for five days of shortness of breath.  Patient reports increased dyspnea on exertion and at rest during that time.  Patient reports cough with some clear to white sputum, also reports that she frequently has orthopnea and has noticed her legs swelling more recently.  Patient denies sick contacts at home, stating she lives alone but her sons are currently visiting.  Denies chest pain, palpitations, nausea, vomiting, diaphoresis, dizziness, or lightheadedness.   Found to be in rapid Afib with HRs 130-140s, started on Cardizem gtt with HRs improved with 70-90s  Kellie neg x 1  Echo 8/2019: LVEF 60%, DD, mild-mod MR, mod AI, mild Ms/pHTN  ECG on admit:  afib w RVR 137bpm; lateral ST depressions   BNP (51494 ->8900) -> 14,000 on this admission  Tele: afib 70-90s    ·	Acute on chronic diastolic HF, EF 55-60%, Gr III DD  ·	Atrial fibrillation with RVR  ·	B/L pleural effusions  ·	CAD Hx, s/p CABG  ·	HTN  ·	GERD    Plan  -Cont with tele  -cont with IV Lasix 40 Bid for now  -Monitor renal function/electrolytes/daily weights  -Cont with Cardizem for rate control along with bbl  -cont with supplemental O2/will switch Duoneb to Atrovent    -cont asa/statin/lisinopril/metoprolol  -monitor lytes/creat with diuresis, replete as needed  -Cont Eliquis for Afib  -cont with PPI 84 YO F with a PMH of CAD s/p CABG, HFpEF, HTN, GERD, OA s/p bilateral TKR presents to the ED for five days of shortness of breath.  Patient reports increased dyspnea on exertion and at rest during that time.  Patient reports cough with some clear to white sputum, also reports that she frequently has orthopnea and has noticed her legs swelling more recently.  Patient denies sick contacts at home, stating she lives alone but her sons are currently visiting.  Denies chest pain, palpitations, nausea, vomiting, diaphoresis, dizziness, or lightheadedness.   Found to be in rapid Afib with HRs 130-140s, started on Cardizem gtt with HRs improved with 70-90s  Kellie neg x 1  Echo 8/2019: LVEF 60%, DD, mild-mod MR, mod AI, mild MS/pHTN  ECG on admit:  afib w RVR 137bpm; lateral ST depressions   BNP (38100 ->8900) -> 14,000 on this admission  Tele: afib 70-90s    ·	Acute on chronic diastolic HF, EF 55-60%, Gr III DD  ·	Atrial fibrillation with RVR  ·	B/L pleural effusions  ·	CAD Hx, s/p CABG  ·	HTN  ·	GERD    Plan  -Cont with tele  -cont with IV Lasix 40 Bid for now  -Monitor renal function/electrolytes/daily weights  -Cont with Cardizem for rate control along with bbl  -cont with supplemental O2/Duoneb  -cont asa/statin/lisinopril/metoprolol  -monitor lytes/creat with diuresis, replete as needed  -Cont Eliquis for Afib  -cont with PPI

## 2019-10-07 NOTE — PROGRESS NOTE ADULT - SUBJECTIVE AND OBJECTIVE BOX
Patient is a 85y old  Female who presents with a chief complaint of dyspnea (06 Oct 2019 12:59)    PAST MEDICAL & SURGICAL HISTORY:  Coronary bypass graft mechanical complication  ST elevation myocardial infarction (STEMI), unspecified artery  BETSY (acute kidney injury)  Insomnia, unspecified type  Gastroesophageal reflux disease without esophagitis  Acute congestive heart failure, unspecified congestive heart failure type  Hypertension  S/P CABG x 1  S/P TKR (total knee replacement) using cement, bilateral    INTERVAL HISTORY: Resting in bed, not in any acute distress   	  MEDICATIONS:  MEDICATIONS  (STANDING):  ALBUTerol/ipratropium for Nebulization 3 milliLiter(s) Nebulizer every 6 hours  apixaban 2.5 milliGRAM(s) Oral every 12 hours  aspirin enteric coated 81 milliGRAM(s) Oral daily  atorvastatin 20 milliGRAM(s) Oral at bedtime  buDESOnide  80 MICROgram(s)/formoterol 4.5 MICROgram(s) Inhaler 2 Puff(s) Inhalation two times a day  diltiazem    Tablet 30 milliGRAM(s) Oral every 6 hours  docusate sodium 100 milliGRAM(s) Oral three times a day  FLUoxetine 40 milliGRAM(s) Oral daily  furosemide   Injectable 40 milliGRAM(s) IV Push daily  furosemide   Injectable 40 milliGRAM(s) IV Push two times a day  influenza   Vaccine 0.5 milliLiter(s) IntraMuscular once  lisinopril 20 milliGRAM(s) Oral daily  metoprolol tartrate 50 milliGRAM(s) Oral two times a day  pantoprazole    Tablet 40 milliGRAM(s) Oral before breakfast    MEDICATIONS  (PRN):  acetaminophen   Tablet .. 650 milliGRAM(s) Oral every 6 hours PRN Temp greater or equal to 38C (100.4F), Moderate Pain (4 - 6)  ALBUTerol    90 MICROgram(s) HFA Inhaler 1 Puff(s) Inhalation every 4 hours PRN Shortness of Breath and/or Wheezing  melatonin 3 milliGRAM(s) Oral at bedtime PRN Insomnia    Vitals:  T(F): 97.8 (10-07-19 @ 11:34), Max: 98 (10-06-19 @ 16:54)  HR: 110 (10-07-19 @ 11:34) (55 - 110)  BP: 150/65 (10-07-19 @ 11:34) (139/68 - 150/65)  RR: 18 (10-07-19 @ 11:34) (18 - 18)  SpO2: 100% (10-07-19 @ 11:34) (98% - 100%)  Wt(kg): --61.3 kg     10-06 @ 07:01  -  10-07 @ 07:00  --------------------------------------------------------  IN:  Total IN: 0 mL    OUT:    Voided: 3 mL  Total OUT: 3 mL    Total NET: -3 mL    Weight (kg): 57.2 (10-04 @ 14:07)  BMI (kg/m2): 23.8 (10-05 @ 00:39)    PHYSICAL EXAM:  Neuro: Awake, responsive  CV: S1 S2 irregular   Lungs: CTABL  GI: Soft, BS +, ND, NT  Extremities: No edema    TELEMETRY: atrial fibrillation    RADIOLOGY: < from: CT Angio Chest w/ IV Cont (10.04.19 @ 17:46) >  No filling defects identified within the segmental pulmonary   arterial tree to indicate pulmonary embolism. Bilateral pleural   effusions. Atelectatic changes identified bilaterally. Hazy airspace   opacity identified within the aerated portions of the bilateral lung   parenchyma suggesting pulmonary edema.    < end of copied text >    DIAGNOSTIC TESTING:  [x ] Echocardiogram: < from: TTE Echo Doppler w/o Cont (08.23.19 @ 16:30) >  1. Left ventricular ejection fraction, by visual estimation, is 55 to   60%.   2. Normal left ventricular internal cavity size.   3. Spectral Doppler shows restrictive pattern of left ventricular  myocardial filling (Grade III diastolic dysfunction).   4. Normal right ventricular size and function.   5. The left atrium is normal in size.   6. The right atrium is normal in size.   7. There is no evidence of pericardial effusion.   8. Mild to moderate mitral annular calcification.   9. Mild to moderate mitral valve regurgitation.  10. Thickening of the anterior and posterior mitral valve leaflets.  11. Structurally normal tricuspid valve, with normal leaflet excursion.  12. Moderate aorticregurgitation.  13. Structurally normal pulmonic valve, with normal leaflet excursion.  14. Estimated pulmonary artery systolic pressure is 47.9 mmHg assuming a   right atrial pressure of 10 mmHg, which is consistent with mild pulmonary   hypertension.  15. Mitral valve mean gradient is 4.9 mmHg consistent with mild mitral   stenosis.    < end of copied text >    LABS:	 	    CARDIAC MARKERS:  Troponin I, Serum: .019 ng/mL (10-04 @ 15:27)    04 Oct 2019 15:27    134    |  95     |  16     ----------------------------<  107    3.8     |  29     |  0.66                         10.8   8.12  )-----------( 341      ( 07 Oct 2019 07:08 )             34.9 ,                       9.9    7.18  )-----------( 306      ( 06 Oct 2019 07:43 )             32.0 ,                       10.1   8.44  )-----------( 313      ( 05 Oct 2019 01:53 )             32.6 ,                       10.7   8.34  )-----------( 319      ( 04 Oct 2019 15:27 )             34.3   proBNP: Serum Pro-Brain Natriuretic Peptide: 10466 pg/mL (10-04 @ 15:27)    INR: 1.11 ratio (10-04 @ 15:27) Patient is a 85y old  Female who presents with a chief complaint of dyspnea (06 Oct 2019 12:59)    PAST MEDICAL & SURGICAL HISTORY:  Coronary bypass graft mechanical complication  ST elevation myocardial infarction (STEMI), unspecified artery  BETSY (acute kidney injury)  Insomnia, unspecified type  Gastroesophageal reflux disease without esophagitis  Acute congestive heart failure, unspecified congestive heart failure type  Hypertension  S/P CABG x 1  S/P TKR (total knee replacement) using cement, bilateral    INTERVAL HISTORY: Resting in bed, not in any acute distress   	  MEDICATIONS:  MEDICATIONS  (STANDING):  ALBUTerol/ipratropium for Nebulization 3 milliLiter(s) Nebulizer every 6 hours  apixaban 2.5 milliGRAM(s) Oral every 12 hours  aspirin enteric coated 81 milliGRAM(s) Oral daily  atorvastatin 20 milliGRAM(s) Oral at bedtime  buDESOnide  80 MICROgram(s)/formoterol 4.5 MICROgram(s) Inhaler 2 Puff(s) Inhalation two times a day  diltiazem    Tablet 30 milliGRAM(s) Oral every 6 hours  docusate sodium 100 milliGRAM(s) Oral three times a day  FLUoxetine 40 milliGRAM(s) Oral daily  furosemide   Injectable 40 milliGRAM(s) IV Push daily  furosemide   Injectable 40 milliGRAM(s) IV Push two times a day  influenza   Vaccine 0.5 milliLiter(s) IntraMuscular once  lisinopril 20 milliGRAM(s) Oral daily  metoprolol tartrate 50 milliGRAM(s) Oral two times a day  pantoprazole    Tablet 40 milliGRAM(s) Oral before breakfast    MEDICATIONS  (PRN):  acetaminophen   Tablet .. 650 milliGRAM(s) Oral every 6 hours PRN Temp greater or equal to 38C (100.4F), Moderate Pain (4 - 6)  ALBUTerol    90 MICROgram(s) HFA Inhaler 1 Puff(s) Inhalation every 4 hours PRN Shortness of Breath and/or Wheezing  melatonin 3 milliGRAM(s) Oral at bedtime PRN Insomnia    Vitals:  T(F): 97.8 (10-07-19 @ 11:34), Max: 98 (10-06-19 @ 16:54)  HR: 110 (10-07-19 @ 11:34) (55 - 110)  BP: 150/65 (10-07-19 @ 11:34) (139/68 - 150/65)  RR: 18 (10-07-19 @ 11:34) (18 - 18)  SpO2: 100% (10-07-19 @ 11:34) (98% - 100%)  Wt(kg): --61.3 kg     10-06 @ 07:01  -  10-07 @ 07:00  --------------------------------------------------------  IN:  Total IN: 0 mL    OUT:    Voided: 3 mL  Total OUT: 3 mL    Total NET: -3 mL    Weight (kg): 57.2 (10-04 @ 14:07)  BMI (kg/m2): 23.8 (10-05 @ 00:39)    PHYSICAL EXAM:  Neuro: Awake, responsive  CV: S1 S2 irregular   Lungs: CTABL  GI: Soft, BS +, ND, NT  Extremities: No edema    TELEMETRY: atrial fibrillation    RADIOLOGY: < from: CT Angio Chest w/ IV Cont (10.04.19 @ 17:46) >  No filling defects identified within the segmental pulmonary   arterial tree to indicate pulmonary embolism. Bilateral pleural   effusions. Atelectatic changes identified bilaterally. Hazy airspace   opacity identified within the aerated portions of the bilateral lung   parenchyma suggesting pulmonary edema.    < end of copied text >    DIAGNOSTIC TESTING:  [x ] Echocardiogram: < from: TTE Echo Doppler w/o Cont (08.23.19 @ 16:30) >  1. Left ventricular ejection fraction, by visual estimation, is 55 to   60%.   2. Normal left ventricular internal cavity size.   3. Spectral Doppler shows restrictive pattern of left ventricular  myocardial filling (Grade III diastolic dysfunction).   4. Normal right ventricular size and function.   5. The left atrium is normal in size.   6. The right atrium is normal in size.   7. There is no evidence of pericardial effusion.   8. Mild to moderate mitral annular calcification.   9. Mild to moderate mitral valve regurgitation.  10. Thickening of the anterior and posterior mitral valve leaflets.  11. Structurally normal tricuspid valve, with normal leaflet excursion.  12. Moderate aorticregurgitation.  13. Structurally normal pulmonic valve, with normal leaflet excursion.  14. Estimated pulmonary artery systolic pressure is 47.9 mmHg assuming a   right atrial pressure of 10 mmHg, which is consistent with mild pulmonary   hypertension.  15. Mitral valve mean gradient is 4.9 mmHg consistent with mild mitral   stenosis.    < end of copied text >  < from: Cardiac Cath Lab - Adult (04.18.16 @ 18:01) >  VENTRICLES: Global left ventricular function was normal. EF estimated was  60 %.  CORONARY VESSELS: The coronary circulation is right dominant.  LM:   --  Distal left main: There was a 60 % stenosis.  LAD:   --  Ostial LAD: There was a 90 % stenosis.  --  Mid LAD: There was a 90 % stenosis.  CX:   --  Proximal circumflex: There was a 90 % stenosis.  RCA:   --  Mid RCA: There was a 100 % stenosis.  COMPLICATIONS: There were no complications.  DIAGNOSTIC RECOMMENDATIONS: Consultation with a cardiac surgeon will be  obtained for coronary artery bypass grafting.  Prepared and signed by    < end of copied text >    LABS:	 	    CARDIAC MARKERS:  Troponin I, Serum: .019 ng/mL (10-04 @ 15:27)    04 Oct 2019 15:27    134    |  95     |  16     ----------------------------<  107    3.8     |  29     |  0.66                         10.8   8.12  )-----------( 341      ( 07 Oct 2019 07:08 )             34.9 ,                       9.9    7.18  )-----------( 306      ( 06 Oct 2019 07:43 )             32.0 ,                       10.1   8.44  )-----------( 313      ( 05 Oct 2019 01:53 )             32.6 ,                       10.7   8.34  )-----------( 319      ( 04 Oct 2019 15:27 )             34.3   proBNP: Serum Pro-Brain Natriuretic Peptide: 73386 pg/mL (10-04 @ 15:27)    INR: 1.11 ratio (10-04 @ 15:27) Patient is a 85y old  Female who presents with a chief complaint of dyspnea (06 Oct 2019 12:59)    PAST MEDICAL & SURGICAL HISTORY:  Coronary bypass graft mechanical complication  ST elevation myocardial infarction (STEMI), unspecified artery  BETSY (acute kidney injury)  Insomnia, unspecified type  Gastroesophageal reflux disease without esophagitis  Acute congestive heart failure, unspecified congestive heart failure type  Hypertension  S/P CABG x 1  S/P TKR (total knee replacement) using cement, bilateral    INTERVAL HISTORY: Resting in chiar, not in any acute distress, still with DILLARD and LE edema  	  MEDICATIONS:  MEDICATIONS  (STANDING):  ALBUTerol/ipratropium for Nebulization 3 milliLiter(s) Nebulizer every 6 hours  apixaban 2.5 milliGRAM(s) Oral every 12 hours  aspirin enteric coated 81 milliGRAM(s) Oral daily  atorvastatin 20 milliGRAM(s) Oral at bedtime  buDESOnide  80 MICROgram(s)/formoterol 4.5 MICROgram(s) Inhaler 2 Puff(s) Inhalation two times a day  diltiazem    Tablet 30 milliGRAM(s) Oral every 6 hours  docusate sodium 100 milliGRAM(s) Oral three times a day  FLUoxetine 40 milliGRAM(s) Oral daily  furosemide   Injectable 40 milliGRAM(s) IV Push daily  furosemide   Injectable 40 milliGRAM(s) IV Push two times a day  influenza   Vaccine 0.5 milliLiter(s) IntraMuscular once  lisinopril 20 milliGRAM(s) Oral daily  metoprolol tartrate 50 milliGRAM(s) Oral two times a day  pantoprazole    Tablet 40 milliGRAM(s) Oral before breakfast    MEDICATIONS  (PRN):  acetaminophen   Tablet .. 650 milliGRAM(s) Oral every 6 hours PRN Temp greater or equal to 38C (100.4F), Moderate Pain (4 - 6)  ALBUTerol    90 MICROgram(s) HFA Inhaler 1 Puff(s) Inhalation every 4 hours PRN Shortness of Breath and/or Wheezing  melatonin 3 milliGRAM(s) Oral at bedtime PRN Insomnia    Vitals:  T(F): 97.8 (10-07-19 @ 11:34), Max: 98 (10-06-19 @ 16:54)  HR: 110 (10-07-19 @ 11:34) (55 - 110)  BP: 150/65 (10-07-19 @ 11:34) (139/68 - 150/65)  RR: 18 (10-07-19 @ 11:34) (18 - 18)  SpO2: 100% (10-07-19 @ 11:34) (98% - 100%)  Wt(kg): --61.3 kg     10-06 @ 07:01  -  10-07 @ 07:00  --------------------------------------------------------  IN:  Total IN: 0 mL    OUT:    Voided: 3 mL  Total OUT: 3 mL    Total NET: -3 mL    Weight (kg): 57.2 (10-04 @ 14:07)  BMI (kg/m2): 23.8 (10-05 @ 00:39)    PHYSICAL EXAM:  Neuro: Awake, responsive  CV: S1 S2 irregular , + SM  Lungs: CTABL  GI: Soft, BS +, ND, NT  Extremities: No edema    TELEMETRY: atrial fibrillation    RADIOLOGY: < from: CT Angio Chest w/ IV Cont (10.04.19 @ 17:46) >  No filling defects identified within the segmental pulmonary   arterial tree to indicate pulmonary embolism. Bilateral pleural   effusions. Atelectatic changes identified bilaterally. Hazy airspace   opacity identified within the aerated portions of the bilateral lung   parenchyma suggesting pulmonary edema.    < end of copied text >    DIAGNOSTIC TESTING:  [x ] Echocardiogram: < from: TTE Echo Doppler w/o Cont (08.23.19 @ 16:30) >  1. Left ventricular ejection fraction, by visual estimation, is 55 to   60%.   2. Normal left ventricular internal cavity size.   3. Spectral Doppler shows restrictive pattern of left ventricular  myocardial filling (Grade III diastolic dysfunction).   4. Normal right ventricular size and function.   5. The left atrium is normal in size.   6. The right atrium is normal in size.   7. There is no evidence of pericardial effusion.   8. Mild to moderate mitral annular calcification.   9. Mild to moderate mitral valve regurgitation.  10. Thickening of the anterior and posterior mitral valve leaflets.  11. Structurally normal tricuspid valve, with normal leaflet excursion.  12. Moderate aorticregurgitation.  13. Structurally normal pulmonic valve, with normal leaflet excursion.  14. Estimated pulmonary artery systolic pressure is 47.9 mmHg assuming a   right atrial pressure of 10 mmHg, which is consistent with mild pulmonary   hypertension.  15. Mitral valve mean gradient is 4.9 mmHg consistent with mild mitral   stenosis.    < end of copied text >  < from: Cardiac Cath Lab - Adult (04.18.16 @ 18:01) >  VENTRICLES: Global left ventricular function was normal. EF estimated was  60 %.  CORONARY VESSELS: The coronary circulation is right dominant.  LM:   --  Distal left main: There was a 60 % stenosis.  LAD:   --  Ostial LAD: There was a 90 % stenosis.  --  Mid LAD: There was a 90 % stenosis.  CX:   --  Proximal circumflex: There was a 90 % stenosis.  RCA:   --  Mid RCA: There was a 100 % stenosis.  COMPLICATIONS: There were no complications.  DIAGNOSTIC RECOMMENDATIONS: Consultation with a cardiac surgeon will be  obtained for coronary artery bypass grafting.  Prepared and signed by    < end of copied text >    LABS:	 	    CARDIAC MARKERS:  Troponin I, Serum: .019 ng/mL (10-04 @ 15:27)    04 Oct 2019 15:27    134    |  95     |  16     ----------------------------<  107    3.8     |  29     |  0.66                         10.8   8.12  )-----------( 341      ( 07 Oct 2019 07:08 )             34.9 ,                       9.9    7.18  )-----------( 306      ( 06 Oct 2019 07:43 )             32.0 ,                       10.1   8.44  )-----------( 313      ( 05 Oct 2019 01:53 )             32.6 ,                       10.7   8.34  )-----------( 319      ( 04 Oct 2019 15:27 )             34.3   proBNP: Serum Pro-Brain Natriuretic Peptide: 86423 pg/mL (10-04 @ 15:27)    INR: 1.11 ratio (10-04 @ 15:27) Patient is a 85y old  Female who presents with a chief complaint of dyspnea (06 Oct 2019 12:59)    PAST MEDICAL & SURGICAL HISTORY:  Coronary bypass graft mechanical complication  ST elevation myocardial infarction (STEMI), unspecified artery  BETSY (acute kidney injury)  Insomnia, unspecified type  Gastroesophageal reflux disease without esophagitis  Acute congestive heart failure, unspecified congestive heart failure type  Hypertension  S/P CABG x 1  S/P TKR (total knee replacement) using cement, bilateral    INTERVAL HISTORY: Resting in chiar, not in any acute distress, still with DILLARD and LE edema  	  MEDICATIONS:  MEDICATIONS  (STANDING):  ALBUTerol/ipratropium for Nebulization 3 milliLiter(s) Nebulizer every 6 hours  apixaban 2.5 milliGRAM(s) Oral every 12 hours  aspirin enteric coated 81 milliGRAM(s) Oral daily  atorvastatin 20 milliGRAM(s) Oral at bedtime  buDESOnide  80 MICROgram(s)/formoterol 4.5 MICROgram(s) Inhaler 2 Puff(s) Inhalation two times a day  diltiazem    Tablet 30 milliGRAM(s) Oral every 6 hours  docusate sodium 100 milliGRAM(s) Oral three times a day  FLUoxetine 40 milliGRAM(s) Oral daily  furosemide   Injectable 40 milliGRAM(s) IV Push daily  furosemide   Injectable 40 milliGRAM(s) IV Push two times a day  influenza   Vaccine 0.5 milliLiter(s) IntraMuscular once  lisinopril 20 milliGRAM(s) Oral daily  metoprolol tartrate 50 milliGRAM(s) Oral two times a day  pantoprazole    Tablet 40 milliGRAM(s) Oral before breakfast    MEDICATIONS  (PRN):  acetaminophen   Tablet .. 650 milliGRAM(s) Oral every 6 hours PRN Temp greater or equal to 38C (100.4F), Moderate Pain (4 - 6)  ALBUTerol    90 MICROgram(s) HFA Inhaler 1 Puff(s) Inhalation every 4 hours PRN Shortness of Breath and/or Wheezing  melatonin 3 milliGRAM(s) Oral at bedtime PRN Insomnia    Vitals:  T(F): 97.8 (10-07-19 @ 11:34), Max: 98 (10-06-19 @ 16:54)  HR: 110 (10-07-19 @ 11:34) (55 - 110)  BP: 150/65 (10-07-19 @ 11:34) (139/68 - 150/65)  RR: 18 (10-07-19 @ 11:34) (18 - 18)  SpO2: 100% (10-07-19 @ 11:34) (98% - 100%)  Wt(kg): --61.3 kg     10-06 @ 07:01  -  10-07 @ 07:00  --------------------------------------------------------  IN:  Total IN: 0 mL    OUT:    Voided: 3 mL  Total OUT: 3 mL    Total NET: -3 mL    Weight (kg): 57.2 (10-04 @ 14:07)  BMI (kg/m2): 23.8 (10-05 @ 00:39)    PHYSICAL EXAM:  Neuro: Awake, responsive  CV: S1 S2 irregular , + SM  Lungs: diminished to bases with few rales    GI: Soft, BS +, ND, NT  Extremities: +LE edema    TELEMETRY: atrial fibrillation    RADIOLOGY: < from: CT Angio Chest w/ IV Cont (10.04.19 @ 17:46) >  No filling defects identified within the segmental pulmonary   arterial tree to indicate pulmonary embolism. Bilateral pleural   effusions. Atelectatic changes identified bilaterally. Hazy airspace   opacity identified within the aerated portions of the bilateral lung   parenchyma suggesting pulmonary edema.    < end of copied text >    DIAGNOSTIC TESTING:  [x ] Echocardiogram: < from: TTE Echo Doppler w/o Cont (08.23.19 @ 16:30) >  1. Left ventricular ejection fraction, by visual estimation, is 55 to   60%.   2. Normal left ventricular internal cavity size.   3. Spectral Doppler shows restrictive pattern of left ventricular  myocardial filling (Grade III diastolic dysfunction).   4. Normal right ventricular size and function.   5. The left atrium is normal in size.   6. The right atrium is normal in size.   7. There is no evidence of pericardial effusion.   8. Mild to moderate mitral annular calcification.   9. Mild to moderate mitral valve regurgitation.  10. Thickening of the anterior and posterior mitral valve leaflets.  11. Structurally normal tricuspid valve, with normal leaflet excursion.  12. Moderate aorticregurgitation.  13. Structurally normal pulmonic valve, with normal leaflet excursion.  14. Estimated pulmonary artery systolic pressure is 47.9 mmHg assuming a   right atrial pressure of 10 mmHg, which is consistent with mild pulmonary   hypertension.  15. Mitral valve mean gradient is 4.9 mmHg consistent with mild mitral   stenosis.    < end of copied text >  < from: Cardiac Cath Lab - Adult (04.18.16 @ 18:01) >  VENTRICLES: Global left ventricular function was normal. EF estimated was  60 %.  CORONARY VESSELS: The coronary circulation is right dominant.  LM:   --  Distal left main: There was a 60 % stenosis.  LAD:   --  Ostial LAD: There was a 90 % stenosis.  --  Mid LAD: There was a 90 % stenosis.  CX:   --  Proximal circumflex: There was a 90 % stenosis.  RCA:   --  Mid RCA: There was a 100 % stenosis.  COMPLICATIONS: There were no complications.  DIAGNOSTIC RECOMMENDATIONS: Consultation with a cardiac surgeon will be  obtained for coronary artery bypass grafting.  Prepared and signed by    < end of copied text >    LABS:	 	    CARDIAC MARKERS:  Troponin I, Serum: .019 ng/mL (10-04 @ 15:27)    04 Oct 2019 15:27    134    |  95     |  16     ----------------------------<  107    3.8     |  29     |  0.66                         10.8   8.12  )-----------( 341      ( 07 Oct 2019 07:08 )             34.9 ,                       9.9    7.18  )-----------( 306      ( 06 Oct 2019 07:43 )             32.0 ,                       10.1   8.44  )-----------( 313      ( 05 Oct 2019 01:53 )             32.6 ,                       10.7   8.34  )-----------( 319      ( 04 Oct 2019 15:27 )             34.3   proBNP: Serum Pro-Brain Natriuretic Peptide: 98390 pg/mL (10-04 @ 15:27)    INR: 1.11 ratio (10-04 @ 15:27)

## 2019-10-07 NOTE — DIETITIAN INITIAL EVALUATION ADULT. - PERTINENT LABORATORY DATA
10-04 Na134 mmol/L<L> Glu 107 mg/dL<H> K+ 3.8 mmol/L Cr  0.66 mg/dL BUN 16 mg/dL 10-04 Alb 3.6 g/dL10-04 ALT 48 U/L AST 50 U/L<H> Alkaline Phosphatase 92 U/L

## 2019-10-07 NOTE — PHYSICAL THERAPY INITIAL EVALUATION ADULT - ADDITIONAL COMMENTS
There are 5 steps, c sergio rails,  far apart and unable to be reached simultaneously, at the entry of the house and no need to negotiate steps at home. (pt now states she only has stoop to enter). As per EMR during last visit pt was supposed to go with son to [place], but pt now states she had no plans to go to [place]

## 2019-10-07 NOTE — PROGRESS NOTE ADULT - SUBJECTIVE AND OBJECTIVE BOX
84 yo lady with ACute on Chronic CHF with Afib.    Vital Signs Last 24 Hrs  T(C): 36.6 (07 Oct 2019 11:34), Max: 36.6 (07 Oct 2019 11:34)  T(F): 97.8 (07 Oct 2019 11:34), Max: 97.8 (07 Oct 2019 11:34)  HR: 110 (07 Oct 2019 11:34) (74 - 110)  BP: 150/65 (07 Oct 2019 11:34) (142/70 - 150/65)  BP(mean): --  RR: 18 (07 Oct 2019 11:34) (18 - 18)  SpO2: 100% (07 Oct 2019 11:34) (98% - 100%)                          10.8   8.12  )-----------( 341      ( 07 Oct 2019 07:08 )             34.9   MEDICATIONS  (STANDING):  ALBUTerol/ipratropium for Nebulization 3 milliLiter(s) Nebulizer every 6 hours  apixaban 2.5 milliGRAM(s) Oral every 12 hours  aspirin enteric coated 81 milliGRAM(s) Oral daily  atorvastatin 20 milliGRAM(s) Oral at bedtime  buDESOnide  80 MICROgram(s)/formoterol 4.5 MICROgram(s) Inhaler 2 Puff(s) Inhalation two times a day  diltiazem    Tablet 30 milliGRAM(s) Oral every 6 hours  docusate sodium 100 milliGRAM(s) Oral three times a day  FLUoxetine 40 milliGRAM(s) Oral daily  furosemide   Injectable 40 milliGRAM(s) IV Push two times a day  influenza   Vaccine 0.5 milliLiter(s) IntraMuscular once  lisinopril 20 milliGRAM(s) Oral daily  metoprolol tartrate 50 milliGRAM(s) Oral two times a day  pantoprazole    Tablet 40 milliGRAM(s) Oral before breakfast    MEDICATIONS  (PRN):  acetaminophen   Tablet .. 650 milliGRAM(s) Oral every 6 hours PRN Temp greater or equal to 38C (100.4F), Moderate Pain (4 - 6)  ALBUTerol    90 MICROgram(s) HFA Inhaler 1 Puff(s) Inhalation every 4 hours PRN Shortness of Breath and/or Wheezing  melatonin 3 milliGRAM(s) Oral at bedtime PRN Insomnia

## 2019-10-07 NOTE — DIETITIAN INITIAL EVALUATION ADULT. - OTHER INFO
Pt lives home alone. Receives meals on wheels and does her own food shopping/cooking. She does not follow any diet restriction. Not receptive to diet education at this time. Left Heart failure nutrition therapy at bedside.

## 2019-10-07 NOTE — DIETITIAN INITIAL EVALUATION ADULT. - PERTINENT MEDS FT
MEDICATIONS  (STANDING):  ALBUTerol/ipratropium for Nebulization 3 milliLiter(s) Nebulizer every 6 hours  apixaban 2.5 milliGRAM(s) Oral every 12 hours  aspirin enteric coated 81 milliGRAM(s) Oral daily  atorvastatin 20 milliGRAM(s) Oral at bedtime  buDESOnide  80 MICROgram(s)/formoterol 4.5 MICROgram(s) Inhaler 2 Puff(s) Inhalation two times a day  diltiazem    Tablet 30 milliGRAM(s) Oral every 6 hours  docusate sodium 100 milliGRAM(s) Oral three times a day  FLUoxetine 40 milliGRAM(s) Oral daily  furosemide   Injectable 40 milliGRAM(s) IV Push daily  furosemide   Injectable 40 milliGRAM(s) IV Push two times a day  influenza   Vaccine 0.5 milliLiter(s) IntraMuscular once  lisinopril 20 milliGRAM(s) Oral daily  metoprolol tartrate 50 milliGRAM(s) Oral two times a day  pantoprazole    Tablet 40 milliGRAM(s) Oral before breakfast    MEDICATIONS  (PRN):  acetaminophen   Tablet .. 650 milliGRAM(s) Oral every 6 hours PRN Temp greater or equal to 38C (100.4F), Moderate Pain (4 - 6)  ALBUTerol    90 MICROgram(s) HFA Inhaler 1 Puff(s) Inhalation every 4 hours PRN Shortness of Breath and/or Wheezing  melatonin 3 milliGRAM(s) Oral at bedtime PRN Insomnia

## 2019-10-08 LAB
ANION GAP SERPL CALC-SCNC: 10 MMOL/L — SIGNIFICANT CHANGE UP (ref 5–17)
BUN SERPL-MCNC: 35 MG/DL — HIGH (ref 7–23)
CALCIUM SERPL-MCNC: 8 MG/DL — LOW (ref 8.5–10.1)
CHLORIDE SERPL-SCNC: 94 MMOL/L — LOW (ref 96–108)
CO2 SERPL-SCNC: 28 MMOL/L — SIGNIFICANT CHANGE UP (ref 22–31)
CREAT SERPL-MCNC: 0.78 MG/DL — SIGNIFICANT CHANGE UP (ref 0.5–1.3)
GLUCOSE SERPL-MCNC: 115 MG/DL — HIGH (ref 70–99)
MAGNESIUM SERPL-MCNC: 1.9 MG/DL — SIGNIFICANT CHANGE UP (ref 1.6–2.6)
PHOSPHATE SERPL-MCNC: 2.9 MG/DL — SIGNIFICANT CHANGE UP (ref 2.5–4.5)
POTASSIUM SERPL-MCNC: 3 MMOL/L — LOW (ref 3.5–5.3)
POTASSIUM SERPL-SCNC: 3 MMOL/L — LOW (ref 3.5–5.3)
SODIUM SERPL-SCNC: 132 MMOL/L — LOW (ref 135–145)

## 2019-10-08 PROCEDURE — 99232 SBSQ HOSP IP/OBS MODERATE 35: CPT

## 2019-10-08 RX ORDER — LANOLIN ALCOHOL/MO/W.PET/CERES
3 CREAM (GRAM) TOPICAL ONCE
Refills: 0 | Status: COMPLETED | OUTPATIENT
Start: 2019-10-08 | End: 2019-10-08

## 2019-10-08 RX ORDER — IPRATROPIUM/ALBUTEROL SULFATE 18-103MCG
3 AEROSOL WITH ADAPTER (GRAM) INHALATION EVERY 6 HOURS
Refills: 0 | Status: DISCONTINUED | OUTPATIENT
Start: 2019-10-08 | End: 2019-10-14

## 2019-10-08 RX ORDER — POTASSIUM CHLORIDE 20 MEQ
40 PACKET (EA) ORAL EVERY 4 HOURS
Refills: 0 | Status: COMPLETED | OUTPATIENT
Start: 2019-10-08 | End: 2019-10-08

## 2019-10-08 RX ADMIN — Medication 40 MILLIGRAM(S): at 17:05

## 2019-10-08 RX ADMIN — Medication 40 MILLIGRAM(S): at 11:18

## 2019-10-08 RX ADMIN — Medication 100 MILLIGRAM(S): at 06:07

## 2019-10-08 RX ADMIN — LISINOPRIL 20 MILLIGRAM(S): 2.5 TABLET ORAL at 06:09

## 2019-10-08 RX ADMIN — Medication 50 MILLIGRAM(S): at 06:07

## 2019-10-08 RX ADMIN — Medication 650 MILLIGRAM(S): at 22:05

## 2019-10-08 RX ADMIN — Medication 650 MILLIGRAM(S): at 20:09

## 2019-10-08 RX ADMIN — ATORVASTATIN CALCIUM 20 MILLIGRAM(S): 80 TABLET, FILM COATED ORAL at 22:57

## 2019-10-08 RX ADMIN — Medication 100 MILLIGRAM(S): at 22:57

## 2019-10-08 RX ADMIN — ALBUTEROL 1 PUFF(S): 90 AEROSOL, METERED ORAL at 02:47

## 2019-10-08 RX ADMIN — Medication 3 MILLILITER(S): at 11:07

## 2019-10-08 RX ADMIN — Medication 81 MILLIGRAM(S): at 11:18

## 2019-10-08 RX ADMIN — Medication 30 MILLIGRAM(S): at 17:05

## 2019-10-08 RX ADMIN — Medication 3 MILLIGRAM(S): at 23:26

## 2019-10-08 RX ADMIN — BUDESONIDE AND FORMOTEROL FUMARATE DIHYDRATE 2 PUFF(S): 160; 4.5 AEROSOL RESPIRATORY (INHALATION) at 17:05

## 2019-10-08 RX ADMIN — Medication 3 MILLILITER(S): at 06:23

## 2019-10-08 RX ADMIN — Medication 30 MILLIGRAM(S): at 06:07

## 2019-10-08 RX ADMIN — Medication 30 MILLIGRAM(S): at 00:09

## 2019-10-08 RX ADMIN — BUDESONIDE AND FORMOTEROL FUMARATE DIHYDRATE 2 PUFF(S): 160; 4.5 AEROSOL RESPIRATORY (INHALATION) at 06:10

## 2019-10-08 RX ADMIN — Medication 100 MILLIGRAM(S): at 11:18

## 2019-10-08 RX ADMIN — Medication 40 MILLIEQUIVALENT(S): at 11:18

## 2019-10-08 RX ADMIN — APIXABAN 2.5 MILLIGRAM(S): 2.5 TABLET, FILM COATED ORAL at 17:05

## 2019-10-08 RX ADMIN — Medication 40 MILLIGRAM(S): at 06:07

## 2019-10-08 RX ADMIN — Medication 30 MILLIGRAM(S): at 23:08

## 2019-10-08 RX ADMIN — Medication 50 MILLIGRAM(S): at 17:05

## 2019-10-08 RX ADMIN — Medication 30 MILLIGRAM(S): at 11:18

## 2019-10-08 RX ADMIN — Medication 40 MILLIEQUIVALENT(S): at 17:05

## 2019-10-08 RX ADMIN — PANTOPRAZOLE SODIUM 40 MILLIGRAM(S): 20 TABLET, DELAYED RELEASE ORAL at 06:08

## 2019-10-08 RX ADMIN — APIXABAN 2.5 MILLIGRAM(S): 2.5 TABLET, FILM COATED ORAL at 06:07

## 2019-10-08 NOTE — PROGRESS NOTE ADULT - SUBJECTIVE AND OBJECTIVE BOX
Patient is a 85y old  Female who presents with a chief complaint of dyspnea (07 Oct 2019 17:18)    PAST MEDICAL & SURGICAL HISTORY:  Coronary bypass graft mechanical complication  ST elevation myocardial infarction (STEMI), unspecified artery  BETSY (acute kidney injury)  Insomnia, unspecified type  Gastroesophageal reflux disease without esophagitis  Acute congestive heart failure, unspecified congestive heart failure type  Hypertension  S/P CABG x 1: 9 days ago  S/P TKR (total knee replacement) using cement, bilateral    INTERVAL HISTORY: Resting in chair, not in any acute distress   	  MEDICATIONS:  MEDICATIONS  (STANDING):  apixaban 2.5 milliGRAM(s) Oral every 12 hours  aspirin enteric coated 81 milliGRAM(s) Oral daily  atorvastatin 20 milliGRAM(s) Oral at bedtime  buDESOnide  80 MICROgram(s)/formoterol 4.5 MICROgram(s) Inhaler 2 Puff(s) Inhalation two times a day  diltiazem    Tablet 30 milliGRAM(s) Oral every 6 hours  docusate sodium 100 milliGRAM(s) Oral three times a day  FLUoxetine 40 milliGRAM(s) Oral daily  furosemide   Injectable 40 milliGRAM(s) IV Push two times a day  influenza   Vaccine 0.5 milliLiter(s) IntraMuscular once  lisinopril 20 milliGRAM(s) Oral daily  metoprolol tartrate 50 milliGRAM(s) Oral two times a day  pantoprazole    Tablet 40 milliGRAM(s) Oral before breakfast  potassium chloride   Powder 40 milliEquivalent(s) Oral every 4 hours    MEDICATIONS  (PRN):  acetaminophen   Tablet .. 650 milliGRAM(s) Oral every 6 hours PRN Temp greater or equal to 38C (100.4F), Moderate Pain (4 - 6)  ALBUTerol    90 MICROgram(s) HFA Inhaler 1 Puff(s) Inhalation every 4 hours PRN Shortness of Breath and/or Wheezing  ALBUTerol/ipratropium for Nebulization 3 milliLiter(s) Nebulizer every 6 hours PRN Shortness of Breath and/or Wheezing  melatonin 3 milliGRAM(s) Oral at bedtime PRN Insomnia      Vitals:  T(F): 97.9 (10-08-19 @ 04:40), Max: 97.9 (10-08-19 @ 04:40)  HR: 81 (10-08-19 @ 11:19) (77 - 98)  BP: 142/66 (10-08-19 @ 04:40) (132/78 - 142/66)  RR: 17 (10-08-19 @ 04:40) (17 - 17)  SpO2: 98% (10-08-19 @ 11:19) (93% - 100%)  Wt(kg): --    10-07 @ 07:01  -  10-08 @ 07:00  --------------------------------------------------------  IN:    Oral Fluid: 118 mL  Total IN: 118 mL    OUT:  Total OUT: 0 mL    Total NET: 118 mL    PHYSICAL EXAM:  Neuro: Awake, responsive  CV: S1 S2 irregular   Lungs: CTABL  GI: Soft, BS +, ND, NT  Extremities: No edema    TELEMETRY: atrial fibrillation     RADIOLOGY: < from: CT Angio Chest w/ IV Cont (10.04.19 @ 17:46) >   No filling defects identified within the segmental pulmonary   arterial tree to indicate pulmonary embolism. Bilateral pleural   effusions. Atelectatic changes identified bilaterally. Hazy airspace   opacity identified within the aerated portions of the bilateral lung   parenchyma suggesting pulmonary edema.    < end of copied text >      DIAGNOSTIC TESTING:    [x ] Echocardiogram: < from: TTE Echo Doppler w/o Cont (08.23.19 @ 16:30) >   1. Left ventricular ejection fraction, by visual estimation, is 55 to   60%.   2. Normal left ventricular internal cavity size.   3. Spectral Doppler shows restrictive pattern of left ventricular  myocardial filling (Grade III diastolic dysfunction).   4. Normal right ventricular size and function.   5. The left atrium is normal in size.   6. The right atrium is normal in size.   7. There is no evidence of pericardial effusion.   8. Mild to moderate mitral annular calcification.   9. Mild to moderate mitral valve regurgitation.  10. Thickening of the anterior and posterior mitral valve leaflets.  11. Structurally normal tricuspid valve, with normal leaflet excursion.  12. Moderate aorticregurgitation.  13. Structurally normal pulmonic valve, with normal leaflet excursion.  14. Estimated pulmonary artery systolic pressure is 47.9 mmHg assuming a   right atrial pressure of 10 mmHg, which is consistent with mild pulmonary   hypertension.  15. Mitral valve mean gradient is 4.9 mmHg consistent with mild mitral   stenosis.    < end of copied text >    LABS:	 	    08 Oct 2019 06:26    132    |  94     |  35     ----------------------------<  115    3.0     |  28     |  0.78     Ca    8.0        08 Oct 2019 06:26  Phos  2.9       08 Oct 2019 06:26  Mg     1.9       08 Oct 2019 06:26                      10.8   8.12  )-----------( 341      ( 07 Oct 2019 07:08 )             34.9 ,                       9.9    7.18  )-----------( 306      ( 06 Oct 2019 07:43 )             32.0 Patient is a 85y old  Female who presents with a chief complaint of dyspnea (07 Oct 2019 17:18)    PAST MEDICAL & SURGICAL HISTORY:  Coronary bypass graft mechanical complication  ST elevation myocardial infarction (STEMI), unspecified artery  BETSY (acute kidney injury)  Insomnia, unspecified type  Gastroesophageal reflux disease without esophagitis  Acute congestive heart failure, unspecified congestive heart failure type  Hypertension  S/P CABG x 1: 9 days ago  S/P TKR (total knee replacement) using cement, bilateral    INTERVAL HISTORY: Resting in chair, not in any acute distress   	  MEDICATIONS:  MEDICATIONS  (STANDING):  apixaban 2.5 milliGRAM(s) Oral every 12 hours  aspirin enteric coated 81 milliGRAM(s) Oral daily  atorvastatin 20 milliGRAM(s) Oral at bedtime  buDESOnide  80 MICROgram(s)/formoterol 4.5 MICROgram(s) Inhaler 2 Puff(s) Inhalation two times a day  diltiazem    Tablet 30 milliGRAM(s) Oral every 6 hours  docusate sodium 100 milliGRAM(s) Oral three times a day  FLUoxetine 40 milliGRAM(s) Oral daily  furosemide   Injectable 40 milliGRAM(s) IV Push two times a day  influenza   Vaccine 0.5 milliLiter(s) IntraMuscular once  lisinopril 20 milliGRAM(s) Oral daily  metoprolol tartrate 50 milliGRAM(s) Oral two times a day  pantoprazole    Tablet 40 milliGRAM(s) Oral before breakfast  potassium chloride   Powder 40 milliEquivalent(s) Oral every 4 hours    MEDICATIONS  (PRN):  acetaminophen   Tablet .. 650 milliGRAM(s) Oral every 6 hours PRN Temp greater or equal to 38C (100.4F), Moderate Pain (4 - 6)  ALBUTerol    90 MICROgram(s) HFA Inhaler 1 Puff(s) Inhalation every 4 hours PRN Shortness of Breath and/or Wheezing  ALBUTerol/ipratropium for Nebulization 3 milliLiter(s) Nebulizer every 6 hours PRN Shortness of Breath and/or Wheezing  melatonin 3 milliGRAM(s) Oral at bedtime PRN Insomnia      Vitals:  T(F): 97.9 (10-08-19 @ 04:40), Max: 97.9 (10-08-19 @ 04:40)  HR: 81 (10-08-19 @ 11:19) (77 - 98)  BP: 142/66 (10-08-19 @ 04:40) (132/78 - 142/66)  RR: 17 (10-08-19 @ 04:40) (17 - 17)  SpO2: 98% (10-08-19 @ 11:19) (93% - 100%)  Wt(kg): --    10-07 @ 07:01  -  10-08 @ 07:00  --------------------------------------------------------  IN:    Oral Fluid: 118 mL  Total IN: 118 mL    OUT:  Total OUT: 0 mL    Total NET: 118 mL    PHYSICAL EXAM:  Neuro: Awake, responsive  CV: S1 S2 irregular + SM  Lungs: diminished to bases with few rales   GI: Soft, BS +, ND, NT  Extremities: ++ LE edema    TELEMETRY: atrial fibrillation     RADIOLOGY: < from: CT Angio Chest w/ IV Cont (10.04.19 @ 17:46) >   No filling defects identified within the segmental pulmonary   arterial tree to indicate pulmonary embolism. Bilateral pleural   effusions. Atelectatic changes identified bilaterally. Hazy airspace   opacity identified within the aerated portions of the bilateral lung   parenchyma suggesting pulmonary edema.    < end of copied text >      DIAGNOSTIC TESTING:    [x ] Echocardiogram: < from: TTE Echo Doppler w/o Cont (08.23.19 @ 16:30) >   1. Left ventricular ejection fraction, by visual estimation, is 55 to   60%.   2. Normal left ventricular internal cavity size.   3. Spectral Doppler shows restrictive pattern of left ventricular  myocardial filling (Grade III diastolic dysfunction).   4. Normal right ventricular size and function.   5. The left atrium is normal in size.   6. The right atrium is normal in size.   7. There is no evidence of pericardial effusion.   8. Mild to moderate mitral annular calcification.   9. Mild to moderate mitral valve regurgitation.  10. Thickening of the anterior and posterior mitral valve leaflets.  11. Structurally normal tricuspid valve, with normal leaflet excursion.  12. Moderate aorticregurgitation.  13. Structurally normal pulmonic valve, with normal leaflet excursion.  14. Estimated pulmonary artery systolic pressure is 47.9 mmHg assuming a   right atrial pressure of 10 mmHg, which is consistent with mild pulmonary   hypertension.  15. Mitral valve mean gradient is 4.9 mmHg consistent with mild mitral   stenosis.    < end of copied text >    LABS:	 	    08 Oct 2019 06:26    132    |  94     |  35     ----------------------------<  115    3.0     |  28     |  0.78     Ca    8.0        08 Oct 2019 06:26  Phos  2.9       08 Oct 2019 06:26  Mg     1.9       08 Oct 2019 06:26                      10.8   8.12  )-----------( 341      ( 07 Oct 2019 07:08 )             34.9 ,                       9.9    7.18  )-----------( 306      ( 06 Oct 2019 07:43 )             32.0

## 2019-10-08 NOTE — PROGRESS NOTE ADULT - ASSESSMENT
86 YO F with a PMH of CAD s/p CABG, HFpEF, HTN, GERD, OA s/p bilateral TKR presents to the ED for five days of shortness of breath.  Patient reports increased dyspnea on exertion and at rest during that time.  Patient reports cough with some clear to white sputum, also reports that she frequently has orthopnea and has noticed her legs swelling more recently.  Patient denies sick contacts at home, stating she lives alone but her sons are currently visiting.  Denies chest pain, palpitations, nausea, vomiting, diaphoresis, dizziness, or lightheadedness.   Found to be in rapid Afib with HRs 130-140s, started on Cardizem gtt with HRs improved with 70-90s  Kellie neg x 1  Echo 8/2019: LVEF 60%, DD, mild-mod MR, mod AI, mild MS/pHTN  ECG on admit:  afib w RVR 137bpm; lateral ST depressions   BNP (90932 ->8900) -> 14,000 on this admission  Tele: afib 70-90s    ·	Acute on chronic diastolic HF, EF 55-60%, Gr III DD  ·	Atrial fibrillation with RVR  ·	B/L pleural effusions  ·	CAD Hx, s/p CABG  ·	HTN  ·	GERD  ·	Hypokalemia    Plan  -Cont with tele  -cont with IV Lasix 40 Bid for now  -Monitor renal function/electrolytes/daily weights  -Cont with Cardizem for rate control along with bbl  -cont with supplemental O2/Duoneb  -cont asa/statin/lisinopril/metoprolol  -monitor lytes/creat with diuresis, replete as needed  -Cont Eliquis for Afib  -cont with PPI  -Activity as tolerated 86 YO F with a PMH of CAD s/p CABG, HFpEF, HTN, GERD, OA s/p bilateral TKR presents to the ED for five days of shortness of breath.  Patient reports increased dyspnea on exertion and at rest during that time.  Patient reports cough with some clear to white sputum, also reports that she frequently has orthopnea and has noticed her legs swelling more recently.  Patient denies sick contacts at home, stating she lives alone but her sons are currently visiting.  Denies chest pain, palpitations, nausea, vomiting, diaphoresis, dizziness, or lightheadedness.   Found to be in rapid Afib with HRs 130-140s, started on Cardizem gtt with HRs improved with 70-90s  Kellie neg x 1  Echo 8/2019: LVEF 60%, DD, mild-mod MR, mod AI, mild MS/pHTN  ECG on admit:  afib w RVR 137bpm; lateral ST depressions   BNP (71455 ->8900) -> 14,000 on this admission  Tele: afib 70-90s    ·	Acute on chronic diastolic HF, EF 55-60%, Gr III DD  ·	Atrial fibrillation with RVR  ·	B/L pleural effusions  ·	Pulmonary HTN- mild  ·	CAD Hx, s/p CABG  ·	HTN  ·	GERD  ·	Hypokalemia  ·	Hyponatremia  ·	VHD ( mild-mod MR, mod AR, mild MS)    Plan  -Cont with tele  -cont with IV Lasix 40 Bid for now  -CHF teaching, seen by dietician, fluid and salt restriction   -Monitor renal function/electrolytes/daily weights  -Cont with Cardizem for rate control along with bbl  -cont with supplemental O2/Duoneb, assess need for O2, check pulse oximetry on room air  -cont asa/statin/lisinopril/metoprolol  -monitor lytes/creat with diuresis, replete as needed  -Cont Eliquis for Afib, monitor h/h  -cont with PPI  -Activity as tolerated

## 2019-10-08 NOTE — PROGRESS NOTE ADULT - SUBJECTIVE AND OBJECTIVE BOX
84 yo lady with acute on chronic CHF.       Vital Signs Last 24 Hrs  T(C): 36.6 (08 Oct 2019 04:40), Max: 36.6 (08 Oct 2019 04:40)  T(F): 97.9 (08 Oct 2019 04:40), Max: 97.9 (08 Oct 2019 04:40)  HR: 84 (08 Oct 2019 12:20) (77 - 98)  BP: 146/21 (08 Oct 2019 12:20) (132/78 - 146/21)  BP(mean): --  RR: 24 (08 Oct 2019 12:20) (17 - 24)  SpO2: 100% (08 Oct 2019 12:20) (93% - 100%)                            10.8   8.12  )-----------( 341      ( 07 Oct 2019 07:08 )             34.9   10-08    132<L>  |  94<L>  |  35<H>  ----------------------------<  115<H>  3.0<L>   |  28  |  0.78    Ca    8.0<L>      08 Oct 2019 06:26  Phos  2.9     10-08  Mg     1.9     10-08    MEDICATIONS  (STANDING):  apixaban 2.5 milliGRAM(s) Oral every 12 hours  aspirin enteric coated 81 milliGRAM(s) Oral daily  atorvastatin 20 milliGRAM(s) Oral at bedtime  buDESOnide  80 MICROgram(s)/formoterol 4.5 MICROgram(s) Inhaler 2 Puff(s) Inhalation two times a day  diltiazem    Tablet 30 milliGRAM(s) Oral every 6 hours  docusate sodium 100 milliGRAM(s) Oral three times a day  FLUoxetine 40 milliGRAM(s) Oral daily  furosemide   Injectable 40 milliGRAM(s) IV Push two times a day  influenza   Vaccine 0.5 milliLiter(s) IntraMuscular once  lisinopril 20 milliGRAM(s) Oral daily  metoprolol tartrate 50 milliGRAM(s) Oral two times a day  pantoprazole    Tablet 40 milliGRAM(s) Oral before breakfast  potassium chloride   Powder 40 milliEquivalent(s) Oral every 4 hours    MEDICATIONS  (PRN):  acetaminophen   Tablet .. 650 milliGRAM(s) Oral every 6 hours PRN Temp greater or equal to 38C (100.4F), Moderate Pain (4 - 6)  ALBUTerol    90 MICROgram(s) HFA Inhaler 1 Puff(s) Inhalation every 4 hours PRN Shortness of Breath and/or Wheezing  ALBUTerol/ipratropium for Nebulization 3 milliLiter(s) Nebulizer every 6 hours PRN Shortness of Breath and/or Wheezing  melatonin 3 milliGRAM(s) Oral at bedtime PRN Insomnia

## 2019-10-09 LAB
ALBUMIN SERPL ELPH-MCNC: 3.6 G/DL — SIGNIFICANT CHANGE UP (ref 3.3–5)
ALP SERPL-CCNC: 93 U/L — SIGNIFICANT CHANGE UP (ref 40–120)
ALT FLD-CCNC: 43 U/L — SIGNIFICANT CHANGE UP (ref 12–78)
ANION GAP SERPL CALC-SCNC: 8 MMOL/L — SIGNIFICANT CHANGE UP (ref 5–17)
AST SERPL-CCNC: 31 U/L — SIGNIFICANT CHANGE UP (ref 15–37)
BILIRUB SERPL-MCNC: 0.7 MG/DL — SIGNIFICANT CHANGE UP (ref 0.2–1.2)
BUN SERPL-MCNC: 33 MG/DL — HIGH (ref 7–23)
CALCIUM SERPL-MCNC: 9 MG/DL — SIGNIFICANT CHANGE UP (ref 8.5–10.1)
CHLORIDE SERPL-SCNC: 93 MMOL/L — LOW (ref 96–108)
CO2 SERPL-SCNC: 30 MMOL/L — SIGNIFICANT CHANGE UP (ref 22–31)
CREAT SERPL-MCNC: 1.03 MG/DL — SIGNIFICANT CHANGE UP (ref 0.5–1.3)
GLUCOSE SERPL-MCNC: 119 MG/DL — HIGH (ref 70–99)
HCT VFR BLD CALC: 35.6 % — SIGNIFICANT CHANGE UP (ref 34.5–45)
HGB BLD-MCNC: 10.9 G/DL — LOW (ref 11.5–15.5)
MCHC RBC-ENTMCNC: 24.2 PG — LOW (ref 27–34)
MCHC RBC-ENTMCNC: 30.6 GM/DL — LOW (ref 32–36)
MCV RBC AUTO: 78.9 FL — LOW (ref 80–100)
NRBC # BLD: 0 /100 WBCS — SIGNIFICANT CHANGE UP (ref 0–0)
PLATELET # BLD AUTO: 401 K/UL — HIGH (ref 150–400)
POTASSIUM SERPL-MCNC: 4.7 MMOL/L — SIGNIFICANT CHANGE UP (ref 3.5–5.3)
POTASSIUM SERPL-SCNC: 4.7 MMOL/L — SIGNIFICANT CHANGE UP (ref 3.5–5.3)
PROT SERPL-MCNC: 7.4 GM/DL — SIGNIFICANT CHANGE UP (ref 6–8.3)
RBC # BLD: 4.51 M/UL — SIGNIFICANT CHANGE UP (ref 3.8–5.2)
RBC # FLD: 15.9 % — HIGH (ref 10.3–14.5)
SODIUM SERPL-SCNC: 131 MMOL/L — LOW (ref 135–145)
WBC # BLD: 9.87 K/UL — SIGNIFICANT CHANGE UP (ref 3.8–10.5)
WBC # FLD AUTO: 9.87 K/UL — SIGNIFICANT CHANGE UP (ref 3.8–10.5)

## 2019-10-09 PROCEDURE — 99232 SBSQ HOSP IP/OBS MODERATE 35: CPT

## 2019-10-09 RX ORDER — ZOLPIDEM TARTRATE 10 MG/1
5 TABLET ORAL AT BEDTIME
Refills: 0 | Status: DISCONTINUED | OUTPATIENT
Start: 2019-10-09 | End: 2019-10-14

## 2019-10-09 RX ADMIN — Medication 100 MILLIGRAM(S): at 11:34

## 2019-10-09 RX ADMIN — Medication 50 MILLIGRAM(S): at 17:09

## 2019-10-09 RX ADMIN — APIXABAN 2.5 MILLIGRAM(S): 2.5 TABLET, FILM COATED ORAL at 05:18

## 2019-10-09 RX ADMIN — ATORVASTATIN CALCIUM 20 MILLIGRAM(S): 80 TABLET, FILM COATED ORAL at 21:52

## 2019-10-09 RX ADMIN — ZOLPIDEM TARTRATE 5 MILLIGRAM(S): 10 TABLET ORAL at 22:18

## 2019-10-09 RX ADMIN — Medication 81 MILLIGRAM(S): at 11:34

## 2019-10-09 RX ADMIN — Medication 100 MILLIGRAM(S): at 21:52

## 2019-10-09 RX ADMIN — Medication 650 MILLIGRAM(S): at 22:18

## 2019-10-09 RX ADMIN — APIXABAN 2.5 MILLIGRAM(S): 2.5 TABLET, FILM COATED ORAL at 17:09

## 2019-10-09 RX ADMIN — Medication 650 MILLIGRAM(S): at 23:10

## 2019-10-09 RX ADMIN — Medication 30 MILLIGRAM(S): at 17:09

## 2019-10-09 RX ADMIN — Medication 40 MILLIGRAM(S): at 11:35

## 2019-10-09 RX ADMIN — Medication 40 MILLIGRAM(S): at 17:16

## 2019-10-09 RX ADMIN — LISINOPRIL 20 MILLIGRAM(S): 2.5 TABLET ORAL at 05:18

## 2019-10-09 RX ADMIN — Medication 30 MILLIGRAM(S): at 11:34

## 2019-10-09 RX ADMIN — PANTOPRAZOLE SODIUM 40 MILLIGRAM(S): 20 TABLET, DELAYED RELEASE ORAL at 05:17

## 2019-10-09 RX ADMIN — Medication 100 MILLIGRAM(S): at 05:31

## 2019-10-09 RX ADMIN — Medication 40 MILLIGRAM(S): at 05:12

## 2019-10-09 RX ADMIN — BUDESONIDE AND FORMOTEROL FUMARATE DIHYDRATE 2 PUFF(S): 160; 4.5 AEROSOL RESPIRATORY (INHALATION) at 17:09

## 2019-10-09 RX ADMIN — Medication 30 MILLIGRAM(S): at 05:18

## 2019-10-09 RX ADMIN — BUDESONIDE AND FORMOTEROL FUMARATE DIHYDRATE 2 PUFF(S): 160; 4.5 AEROSOL RESPIRATORY (INHALATION) at 05:31

## 2019-10-09 RX ADMIN — Medication 50 MILLIGRAM(S): at 05:18

## 2019-10-09 NOTE — PROGRESS NOTE ADULT - SUBJECTIVE AND OBJECTIVE BOX
86 yo lady on meds. Slowly improving.     Vital Signs Last 24 Hrs  T(C): 36.4 (09 Oct 2019 10:28), Max: 36.9 (09 Oct 2019 04:20)  T(F): 97.5 (09 Oct 2019 10:28), Max: 98.4 (09 Oct 2019 04:20)  HR: 83 (09 Oct 2019 10:28) (83 - 102)  BP: 144/84 (09 Oct 2019 10:28) (143/81 - 182/87)  BP(mean): --  RR: 20 (09 Oct 2019 10:28) (17 - 20)  SpO2: 99% (09 Oct 2019 10:28) (87% - 100%)                          10.9   9.87  )-----------( 401      ( 09 Oct 2019 07:20 )             35.6   MEDICATIONS  (STANDING):  apixaban 2.5 milliGRAM(s) Oral every 12 hours  aspirin enteric coated 81 milliGRAM(s) Oral daily  atorvastatin 20 milliGRAM(s) Oral at bedtime  buDESOnide  80 MICROgram(s)/formoterol 4.5 MICROgram(s) Inhaler 2 Puff(s) Inhalation two times a day  diltiazem    Tablet 30 milliGRAM(s) Oral every 6 hours  docusate sodium 100 milliGRAM(s) Oral three times a day  FLUoxetine 40 milliGRAM(s) Oral daily  furosemide   Injectable 40 milliGRAM(s) IV Push two times a day  influenza   Vaccine 0.5 milliLiter(s) IntraMuscular once  lisinopril 20 milliGRAM(s) Oral daily  metoprolol tartrate 50 milliGRAM(s) Oral two times a day  pantoprazole    Tablet 40 milliGRAM(s) Oral before breakfast    MEDICATIONS  (PRN):  acetaminophen   Tablet .. 650 milliGRAM(s) Oral every 6 hours PRN Temp greater or equal to 38C (100.4F), Moderate Pain (4 - 6)  ALBUTerol    90 MICROgram(s) HFA Inhaler 1 Puff(s) Inhalation every 4 hours PRN Shortness of Breath and/or Wheezing  ALBUTerol/ipratropium for Nebulization 3 milliLiter(s) Nebulizer every 6 hours PRN Shortness of Breath and/or Wheezing

## 2019-10-09 NOTE — PROGRESS NOTE ADULT - SUBJECTIVE AND OBJECTIVE BOX
Patient is a 85y old  Female who presents with a chief complaint of dyspnea (09 Oct 2019 13:18)    PAST MEDICAL & SURGICAL HISTORY:  Coronary bypass graft mechanical complication  ST elevation myocardial infarction (STEMI), unspecified artery  BETSY (acute kidney injury)  Insomnia, unspecified type  Gastroesophageal reflux disease without esophagitis  Acute congestive heart failure, unspecified congestive heart failure type  Hypertension  S/P CABG x 1: 9 days ago  S/P TKR (total knee replacement) using cement, bilateral      INTERVAL HISTORY: patient in bed, reports mild improvement in SOB, denies chest pain, palpitations or dizziness.  	  MEDICATIONS:  MEDICATIONS  (STANDING):  apixaban 2.5 milliGRAM(s) Oral every 12 hours  aspirin enteric coated 81 milliGRAM(s) Oral daily  atorvastatin 20 milliGRAM(s) Oral at bedtime  buDESOnide  80 MICROgram(s)/formoterol 4.5 MICROgram(s) Inhaler 2 Puff(s) Inhalation two times a day  diltiazem    Tablet 30 milliGRAM(s) Oral every 6 hours  docusate sodium 100 milliGRAM(s) Oral three times a day  FLUoxetine 40 milliGRAM(s) Oral daily  furosemide   Injectable 40 milliGRAM(s) IV Push two times a day  influenza   Vaccine 0.5 milliLiter(s) IntraMuscular once  lisinopril 20 milliGRAM(s) Oral daily  metoprolol tartrate 50 milliGRAM(s) Oral two times a day  pantoprazole    Tablet 40 milliGRAM(s) Oral before breakfast    MEDICATIONS  (PRN):  acetaminophen   Tablet .. 650 milliGRAM(s) Oral every 6 hours PRN Temp greater or equal to 38C (100.4F), Moderate Pain (4 - 6)  ALBUTerol    90 MICROgram(s) HFA Inhaler 1 Puff(s) Inhalation every 4 hours PRN Shortness of Breath and/or Wheezing  ALBUTerol/ipratropium for Nebulization 3 milliLiter(s) Nebulizer every 6 hours PRN Shortness of Breath and/or Wheezing  zolpidem 5 milliGRAM(s) Oral at bedtime PRN Insomnia      Vitals:  T(F): 97.5 (10-09-19 @ 10:28), Max: 98.4 (10-09-19 @ 04:20)  HR: 83 (10-09-19 @ 10:28) (83 - 102)  BP: 144/84 (10-09-19 @ 10:28) (143/81 - 148/86)  RR: 20 (10-09-19 @ 10:28) (17 - 20)  SpO2: 99% (10-09-19 @ 10:28) (97% - 100%)  Wt(kg): --62.7 kg    10-08 @ 07:01  -  10-09 @ 07:00  --------------------------------------------------------  IN:    Oral Fluid: 120 mL  Total IN: 120 mL    OUT:  Total OUT: 0 mL    Total NET: 120 mL    PHYSICAL EXAM:  Neuro: Awake, responsive  CV: S1 S2 irregular  Lungs: Diminished   GI: Soft, BS +, ND, NT  Extremities: LE edema    TELEMETRY: AFIB 80  	    RADIOLOGY: < from: CT Angio Chest w/ IV Cont (10.04.19 @ 17:46) >  FINDINGS: No filling defects are identified within the segmental   pulmonary arterial tree to indicate pulmonary embolism.    No abnormally enlarged mediastinal lymph nodes are noted. No left hilar   masses are identified. The right hilar region is obscured secondary to   adjacent atelectatic lung parenchyma. There is no evidence for axillary   lymphadenopathy. The heart size is enlarged. No pericardial effusion is   identified. Thoracic aortic caliber demonstrates unremarkable caliber.   Extensive coronary and vascular arterial calcifications identified.   Tortuosity of the thoracic aorta is noted. Calcification identified in   the mitral valvular region.    The central bronchial anatomy appears patent.    Atelectatic changes are identified within the right middle lobe, right   lower lobe, inferior aspect of the lingular segment of the left upper   lobe and left lower lobe; underlying parenchymal infiltrate,   consolidation or mass lesion cannot be excluded. There is also hazy   airspace opacity identified within the aerated portions of the bilateral   lung parenchyma which may be related to pulmonary edema. Moderate in size   right pleural effusion is identified. A small left pleural effusion is   noted. There is no evidence for pneumothorax. No mediastinal shift is   noted.    The adrenal glands are not well delineated. A 2.7 cm cystic type focus is   identified within the visualized portion of the left kidney. Note is made   of a hiatal hernia.    Fracture deformity of the proximal left humerus identified with   remodeling of the left humeral head and left acetabular region. The   patient is status post sternotomy. Advanced degenerative change of the   thoracic spine noted.    IMPRESSION:  No filling defects identified within the segmental pulmonary   arterial tree to indicate pulmonary embolism. Bilateral pleural   effusions. Atelectatic changes identified bilaterally. Hazy airspace   opacity identified within the aerated portions of the bilateral lung   parenchyma suggesting pulmonary edema.    < end of copied text >      DIAGNOSTIC TESTING:    [x ] Echocardiogram: < from: TTE Echo Doppler w/o Cont (08.23.19 @ 16:30) >  Summary:   1. Left ventricular ejection fraction, by visual estimation, is 55 to   60%.   2. Normal left ventricular internal cavity size.   3. Spectral Doppler shows restrictive pattern of left ventricular  myocardial filling (Grade III diastolic dysfunction).   4. Normal right ventricular size and function.   5. The left atrium is normal in size.   6. The right atrium is normal in size.   7. There is no evidence of pericardial effusion.   8. Mild to moderate mitral annular calcification.   9. Mild to moderate mitral valve regurgitation.  10. Thickening of the anterior and posterior mitral valve leaflets.  11. Structurally normal tricuspid valve, with normal leaflet excursion.  12. Moderate aorticregurgitation.  13. Structurally normal pulmonic valve, with normal leaflet excursion.  14. Estimated pulmonary artery systolic pressure is 47.9 mmHg assuming a   right atrial pressure of 10 mmHg, which is consistent with mild pulmonary   hypertension.  15. Mitral valve mean gradient is 4.9 mmHg consistent with mild mitral   stenosis.    < end of copied text >    LABS:	 	    9 Oct 2019 07:20    131    |  93     |  33     ----------------------------<  119    4.7     |  30     |  1.03   08 Oct 2019 06:26    132    |  94     |  35     ----------------------------<  115    3.0     |  28     |  0.78     Ca    9.0        09 Oct 2019 07:20  Phos  2.9       08 Oct 2019 06:26  Mg     1.9       08 Oct 2019 06:26    TPro  7.4    /  Alb  3.6    /  TBili  0.7    /  DBili  x      /  AST  31     /  ALT  43     /  AlkPhos  93     09 Oct 2019 07:20                          10.9   9.87  )-----------( 401      ( 09 Oct 2019 07:20 )             35.6 ,                       10.8   8.12  )-----------( 341      ( 07 Oct 2019 07:08 )             34.9

## 2019-10-09 NOTE — PROGRESS NOTE ADULT - ASSESSMENT
84 YO F with a PMH of CAD s/p CABG, HFpEF, HTN, GERD, OA s/p bilateral TKR presents to the ED for five days of shortness of breath.  Patient reports increased dyspnea on exertion and at rest during that time.  Patient reports cough with some clear to white sputum, also reports that she frequently has orthopnea and has noticed her legs swelling more recently.  Patient denies sick contacts at home, stating she lives alone but her sons are currently visiting.  Denies chest pain, palpitations, nausea, vomiting, diaphoresis, dizziness, or lightheadedness.   Found to be in rapid Afib with HRs 130-140s, started on Cardizem gtt with HRs improved with 70-90s  Kellie neg x 1  Echo 8/2019: LVEF 60%, DD, mild-mod MR, mod AI, mild MS/pHTN  ECG on admit:  afib w RVR 137bpm; lateral ST depressions   BNP (20179 ->8900) -> 14,000 on this admission  Tele: afib 70-90s    ·	Acute on chronic diastolic HF, EF 55-60%, Gr III DD  ·	Atrial fibrillation with RVR  ·	B/L pleural effusions  ·	Pulmonary HTN- mild  ·	CAD Hx, s/p CABG  ·	HTN  ·	GERD  ·	Hypokalemia  ·	Hyponatremia  ·	VHD ( mild-mod MR, mod AR, mild MS)    Plan  -Cont with tele  -cont with IV Lasix 40 Bid for now  - Repeat CXR in AM to assess pleural effusion  -Cont Eliquis for Afib, monitor h/h  -CHF teaching, seen by dietician, fluid and salt restriction   -Monitor renal function/electrolytes/daily weights  -Cont with Cardizem 30 mg Q6H for rate control along with bbl  -cont with supplemental O2/Duoneb, assess need for O2, check pulse oximetry on room air  -cont asa/statin/lisinopril/metoprolol  -monitor lytes/creat with diuresis, replete as needed  -Renal for Hyponatremia  -cont with PPI  -Activity as tolerated

## 2019-10-10 LAB
ALBUMIN SERPL ELPH-MCNC: 3.4 G/DL — SIGNIFICANT CHANGE UP (ref 3.3–5)
ALP SERPL-CCNC: 96 U/L — SIGNIFICANT CHANGE UP (ref 40–120)
ALT FLD-CCNC: 38 U/L — SIGNIFICANT CHANGE UP (ref 12–78)
ANION GAP SERPL CALC-SCNC: 7 MMOL/L — SIGNIFICANT CHANGE UP (ref 5–17)
AST SERPL-CCNC: 35 U/L — SIGNIFICANT CHANGE UP (ref 15–37)
BASE EXCESS BLDA CALC-SCNC: 6.6 MMOL/L — HIGH (ref -2–2)
BILIRUB SERPL-MCNC: 0.8 MG/DL — SIGNIFICANT CHANGE UP (ref 0.2–1.2)
BLOOD GAS COMMENTS: SIGNIFICANT CHANGE UP
BLOOD GAS COMMENTS: SIGNIFICANT CHANGE UP
BLOOD GAS SOURCE: SIGNIFICANT CHANGE UP
BUN SERPL-MCNC: 30 MG/DL — HIGH (ref 7–23)
CALCIUM SERPL-MCNC: 9.2 MG/DL — SIGNIFICANT CHANGE UP (ref 8.5–10.1)
CHLORIDE SERPL-SCNC: 94 MMOL/L — LOW (ref 96–108)
CO2 SERPL-SCNC: 27 MMOL/L — SIGNIFICANT CHANGE UP (ref 22–31)
CREAT SERPL-MCNC: 0.85 MG/DL — SIGNIFICANT CHANGE UP (ref 0.5–1.3)
CULTURE RESULTS: SIGNIFICANT CHANGE UP
CULTURE RESULTS: SIGNIFICANT CHANGE UP
GLUCOSE SERPL-MCNC: 137 MG/DL — HIGH (ref 70–99)
HCO3 BLDA-SCNC: 30 MMOL/L — HIGH (ref 21–29)
HCT VFR BLD CALC: 38 % — SIGNIFICANT CHANGE UP (ref 34.5–45)
HGB BLD-MCNC: 11.7 G/DL — SIGNIFICANT CHANGE UP (ref 11.5–15.5)
HOROWITZ INDEX BLDA+IHG-RTO: 28 — SIGNIFICANT CHANGE UP
MCHC RBC-ENTMCNC: 24.3 PG — LOW (ref 27–34)
MCHC RBC-ENTMCNC: 30.8 GM/DL — LOW (ref 32–36)
MCV RBC AUTO: 78.8 FL — LOW (ref 80–100)
NRBC # BLD: 0 /100 WBCS — SIGNIFICANT CHANGE UP (ref 0–0)
PCO2 BLDA: 42 MMHG — SIGNIFICANT CHANGE UP (ref 32–46)
PH BLD: 7.48 — HIGH (ref 7.35–7.45)
PLATELET # BLD AUTO: 329 K/UL — SIGNIFICANT CHANGE UP (ref 150–400)
PO2 BLDA: 100 MMHG — SIGNIFICANT CHANGE UP (ref 74–108)
POTASSIUM SERPL-MCNC: 4.2 MMOL/L — SIGNIFICANT CHANGE UP (ref 3.5–5.3)
POTASSIUM SERPL-SCNC: 4.2 MMOL/L — SIGNIFICANT CHANGE UP (ref 3.5–5.3)
PROT SERPL-MCNC: 7.3 GM/DL — SIGNIFICANT CHANGE UP (ref 6–8.3)
RBC # BLD: 4.82 M/UL — SIGNIFICANT CHANGE UP (ref 3.8–5.2)
RBC # FLD: 16 % — HIGH (ref 10.3–14.5)
SAO2 % BLDA: 97 % — HIGH (ref 92–96)
SODIUM SERPL-SCNC: 128 MMOL/L — LOW (ref 135–145)
SPECIMEN SOURCE: SIGNIFICANT CHANGE UP
SPECIMEN SOURCE: SIGNIFICANT CHANGE UP
TSH SERPL-MCNC: 1.26 UU/ML — SIGNIFICANT CHANGE UP (ref 0.36–3.74)
URATE SERPL-MCNC: 8.3 MG/DL — HIGH (ref 2.5–7)
WBC # BLD: 8.9 K/UL — SIGNIFICANT CHANGE UP (ref 3.8–10.5)
WBC # FLD AUTO: 8.9 K/UL — SIGNIFICANT CHANGE UP (ref 3.8–10.5)

## 2019-10-10 PROCEDURE — 99232 SBSQ HOSP IP/OBS MODERATE 35: CPT

## 2019-10-10 PROCEDURE — 71045 X-RAY EXAM CHEST 1 VIEW: CPT | Mod: 26

## 2019-10-10 RX ORDER — FUROSEMIDE 40 MG
60 TABLET ORAL EVERY 12 HOURS
Refills: 0 | Status: DISCONTINUED | OUTPATIENT
Start: 2019-10-10 | End: 2019-10-14

## 2019-10-10 RX ORDER — SALIVA SUBSTITUTE COMB NO.11 351 MG
5 POWDER IN PACKET (EA) MUCOUS MEMBRANE DAILY
Refills: 0 | Status: DISCONTINUED | OUTPATIENT
Start: 2019-10-10 | End: 2019-10-14

## 2019-10-10 RX ORDER — FUROSEMIDE 40 MG
40 TABLET ORAL ONCE
Refills: 0 | Status: COMPLETED | OUTPATIENT
Start: 2019-10-10 | End: 2019-10-10

## 2019-10-10 RX ADMIN — PANTOPRAZOLE SODIUM 40 MILLIGRAM(S): 20 TABLET, DELAYED RELEASE ORAL at 06:04

## 2019-10-10 RX ADMIN — Medication 40 MILLIGRAM(S): at 10:28

## 2019-10-10 RX ADMIN — Medication 30 MILLIGRAM(S): at 06:04

## 2019-10-10 RX ADMIN — Medication 100 MILLIGRAM(S): at 06:04

## 2019-10-10 RX ADMIN — Medication 100 MILLIGRAM(S): at 21:28

## 2019-10-10 RX ADMIN — ALBUTEROL 1 PUFF(S): 90 AEROSOL, METERED ORAL at 11:53

## 2019-10-10 RX ADMIN — ATORVASTATIN CALCIUM 20 MILLIGRAM(S): 80 TABLET, FILM COATED ORAL at 21:28

## 2019-10-10 RX ADMIN — Medication 40 MILLIGRAM(S): at 06:04

## 2019-10-10 RX ADMIN — Medication 30 MILLIGRAM(S): at 01:12

## 2019-10-10 RX ADMIN — Medication 30 MILLIGRAM(S): at 17:17

## 2019-10-10 RX ADMIN — Medication 40 MILLIGRAM(S): at 11:52

## 2019-10-10 RX ADMIN — Medication 5 MILLILITER(S): at 11:54

## 2019-10-10 RX ADMIN — Medication 50 MILLIGRAM(S): at 06:04

## 2019-10-10 RX ADMIN — APIXABAN 2.5 MILLIGRAM(S): 2.5 TABLET, FILM COATED ORAL at 06:04

## 2019-10-10 RX ADMIN — Medication 30 MILLIGRAM(S): at 11:52

## 2019-10-10 RX ADMIN — Medication 81 MILLIGRAM(S): at 11:53

## 2019-10-10 RX ADMIN — APIXABAN 2.5 MILLIGRAM(S): 2.5 TABLET, FILM COATED ORAL at 17:16

## 2019-10-10 RX ADMIN — ZOLPIDEM TARTRATE 5 MILLIGRAM(S): 10 TABLET ORAL at 21:28

## 2019-10-10 RX ADMIN — LISINOPRIL 20 MILLIGRAM(S): 2.5 TABLET ORAL at 06:04

## 2019-10-10 RX ADMIN — Medication 50 MILLIGRAM(S): at 17:18

## 2019-10-10 RX ADMIN — BUDESONIDE AND FORMOTEROL FUMARATE DIHYDRATE 2 PUFF(S): 160; 4.5 AEROSOL RESPIRATORY (INHALATION) at 17:16

## 2019-10-10 RX ADMIN — Medication 60 MILLIGRAM(S): at 17:17

## 2019-10-10 NOTE — PROGRESS NOTE ADULT - SUBJECTIVE AND OBJECTIVE BOX
Patient is a 85y old  Female who presents with a chief complaint of dyspnea (10 Oct 2019 10:48)    PAST MEDICAL & SURGICAL HISTORY:  Coronary bypass graft mechanical complication  ST elevation myocardial infarction (STEMI), unspecified artery  BETSY (acute kidney injury)  Insomnia, unspecified type  Gastroesophageal reflux disease without esophagitis  Acute congestive heart failure, unspecified congestive heart failure type  Hypertension  S/P CABG x 1: 9 days ago  S/P TKR (total knee replacement) using cement, bilateral    INTERVAL HISTORY: Patient sitting in chair, O2 NC in use, c/o SOB since admission  	  MEDICATIONS:  MEDICATIONS  (STANDING):  apixaban 2.5 milliGRAM(s) Oral every 12 hours  aspirin enteric coated 81 milliGRAM(s) Oral daily  atorvastatin 20 milliGRAM(s) Oral at bedtime  Biotene Dry Mouth Oral Rinse 5 milliLiter(s) Swish and Spit daily  buDESOnide  80 MICROgram(s)/formoterol 4.5 MICROgram(s) Inhaler 2 Puff(s) Inhalation two times a day  diltiazem    Tablet 30 milliGRAM(s) Oral every 6 hours  docusate sodium 100 milliGRAM(s) Oral three times a day  FLUoxetine 40 milliGRAM(s) Oral daily  furosemide   Injectable 40 milliGRAM(s) IV Push two times a day  influenza   Vaccine 0.5 milliLiter(s) IntraMuscular once  lisinopril 20 milliGRAM(s) Oral daily  metoprolol tartrate 50 milliGRAM(s) Oral two times a day  pantoprazole    Tablet 40 milliGRAM(s) Oral before breakfast    MEDICATIONS  (PRN):  acetaminophen   Tablet .. 650 milliGRAM(s) Oral every 6 hours PRN Temp greater or equal to 38C (100.4F), Moderate Pain (4 - 6)  ALBUTerol    90 MICROgram(s) HFA Inhaler 1 Puff(s) Inhalation every 4 hours PRN Shortness of Breath and/or Wheezing  ALBUTerol/ipratropium for Nebulization 3 milliLiter(s) Nebulizer every 6 hours PRN Shortness of Breath and/or Wheezing  zolpidem 5 milliGRAM(s) Oral at bedtime PRN Insomnia      Vitals:  T(F): 97.8 (10-10-19 @ 05:21), Max: 98.6 (10-10-19 @ 02:06)  HR: 116 (10-10-19 @ 05:21) (82 - 133)  BP: 162/96 (10-10-19 @ 05:21) (150/69 - 204/118)  RR: 20 (10-10-19 @ 05:21) (20 - 22)  SpO2: 94% (10-10-19 @ 05:21) (93% - 99%)  Wt(kg): --62.7-->63.1 kg    10-09 @ 07:01  -  10-10 @ 07:00  --------------------------------------------------------  IN:    Oral Fluid: 120 mL  Total IN: 120 mL    OUT:  Total OUT: 0 mL    Total NET: 120 mL    PHYSICAL EXAM:  Neuro: Awake, responsive  CV: S1 S2 irregular   Lungs: Diminished   GI: Soft, BS +, ND, NT  Extremities: + LE edema    TELEMETRY: AFIB   	    RADIOLOGY: Repeat CXR pending    DIAGNOSTIC TESTING:    [x ] Echocardiogram: < from: TTE Echo Doppler w/o Cont (08.23.19 @ 16:30) >  Summary:   1. Left ventricular ejection fraction, by visual estimation, is 55 to   60%.   2. Normal left ventricular internal cavity size.   3. Spectral Doppler shows restrictive pattern of left ventricular  myocardial filling (Grade III diastolic dysfunction).   4. Normal right ventricular size and function.   5. The left atrium is normal in size.   6. The right atrium is normal in size.   7. There is no evidence of pericardial effusion.   8. Mild to moderate mitral annular calcification.   9. Mild to moderate mitral valve regurgitation.  10. Thickening of the anterior and posterior mitral valve leaflets.  11. Structurally normal tricuspid valve, with normal leaflet excursion.  12. Moderate aorticregurgitation.  13. Structurally normal pulmonic valve, with normal leaflet excursion.  14. Estimated pulmonary artery systolic pressure is 47.9 mmHg assuming a   right atrial pressure of 10 mmHg, which is consistent with mild pulmonary   hypertension.  15. Mitral valve mean gradient is 4.9 mmHg consistent with mild mitral   stenosis.    < end of copied text >    LABS:	 	      10 Oct 2019 07:45    128    |  94     |  30     ----------------------------<  137    4.2     |  27     |  0.85   09 Oct 2019 07:20    131    |  93     |  33     ----------------------------<  119    4.7     |  30     |  1.03   08 Oct 2019 06:26    132    |  94     |  35     ----------------------------<  115    3.0     |  28     |  0.78     Ca    9.2        10 Oct 2019 07:45    TPro  7.3    /  Alb  3.4    /  TBili  0.8    /  DBili  x      /  AST  35     /  ALT  38     /  AlkPhos  96     10 Oct 2019 07:45                          11.7   8.90  )-----------( 329      ( 10 Oct 2019 07:45 )             38.0 ,                       10.9   9.87  )-----------( 401      ( 09 Oct 2019 07:20 )             35.6 Patient is a 85y old  Female who presents with a chief complaint of dyspnea (10 Oct 2019 10:48)    PAST MEDICAL & SURGICAL HISTORY:  Coronary bypass graft mechanical complication  ST elevation myocardial infarction (STEMI), unspecified artery  BETSY (acute kidney injury)  Insomnia, unspecified type  Gastroesophageal reflux disease without esophagitis  Acute congestive heart failure, unspecified congestive heart failure type  Hypertension  S/P CABG x 1: 9 days ago  S/P TKR (total knee replacement) using cement, bilateral    INTERVAL HISTORY: Patient sitting in chair, O2 NC in use, c/o SOB since admission, CXR pending. Denies chest pain, palpitations, cough.  	  MEDICATIONS:  MEDICATIONS  (STANDING):  apixaban 2.5 milliGRAM(s) Oral every 12 hours  aspirin enteric coated 81 milliGRAM(s) Oral daily  atorvastatin 20 milliGRAM(s) Oral at bedtime  Biotene Dry Mouth Oral Rinse 5 milliLiter(s) Swish and Spit daily  buDESOnide  80 MICROgram(s)/formoterol 4.5 MICROgram(s) Inhaler 2 Puff(s) Inhalation two times a day  diltiazem    Tablet 30 milliGRAM(s) Oral every 6 hours  docusate sodium 100 milliGRAM(s) Oral three times a day  FLUoxetine 40 milliGRAM(s) Oral daily  furosemide   Injectable 40 milliGRAM(s) IV Push two times a day  influenza   Vaccine 0.5 milliLiter(s) IntraMuscular once  lisinopril 20 milliGRAM(s) Oral daily  metoprolol tartrate 50 milliGRAM(s) Oral two times a day  pantoprazole    Tablet 40 milliGRAM(s) Oral before breakfast    MEDICATIONS  (PRN):  acetaminophen   Tablet .. 650 milliGRAM(s) Oral every 6 hours PRN Temp greater or equal to 38C (100.4F), Moderate Pain (4 - 6)  ALBUTerol    90 MICROgram(s) HFA Inhaler 1 Puff(s) Inhalation every 4 hours PRN Shortness of Breath and/or Wheezing  ALBUTerol/ipratropium for Nebulization 3 milliLiter(s) Nebulizer every 6 hours PRN Shortness of Breath and/or Wheezing  zolpidem 5 milliGRAM(s) Oral at bedtime PRN Insomnia      Vitals:  T(F): 97.8 (10-10-19 @ 05:21), Max: 98.6 (10-10-19 @ 02:06)  HR: 116 (10-10-19 @ 05:21) (82 - 133)  BP: 162/96 (10-10-19 @ 05:21) (150/69 - 204/118)  RR: 20 (10-10-19 @ 05:21) (20 - 22)  SpO2: 94% (10-10-19 @ 05:21) (93% - 99%)  Wt(kg): --62.7-->63.1 kg    10-09 @ 07:01  -  10-10 @ 07:00  --------------------------------------------------------  IN:    Oral Fluid: 120 mL  Total IN: 120 mL    OUT:  Total OUT: 0 mL    Total NET: 120 mL    PHYSICAL EXAM:  Neuro: Awake, responsive  CV: S1 S2 irregular   Lungs: Diminished   GI: Soft, BS +, ND, NT  Extremities: + LE edema    TELEMETRY: AFIB   	    RADIOLOGY: Repeat CXR pending    DIAGNOSTIC TESTING:    [x ] Echocardiogram: < from: TTE Echo Doppler w/o Cont (08.23.19 @ 16:30) >  Summary:   1. Left ventricular ejection fraction, by visual estimation, is 55 to   60%.   2. Normal left ventricular internal cavity size.   3. Spectral Doppler shows restrictive pattern of left ventricular  myocardial filling (Grade III diastolic dysfunction).   4. Normal right ventricular size and function.   5. The left atrium is normal in size.   6. The right atrium is normal in size.   7. There is no evidence of pericardial effusion.   8. Mild to moderate mitral annular calcification.   9. Mild to moderate mitral valve regurgitation.  10. Thickening of the anterior and posterior mitral valve leaflets.  11. Structurally normal tricuspid valve, with normal leaflet excursion.  12. Moderate aorticregurgitation.  13. Structurally normal pulmonic valve, with normal leaflet excursion.  14. Estimated pulmonary artery systolic pressure is 47.9 mmHg assuming a   right atrial pressure of 10 mmHg, which is consistent with mild pulmonary   hypertension.  15. Mitral valve mean gradient is 4.9 mmHg consistent with mild mitral   stenosis.    < end of copied text >    LABS:	 	      10 Oct 2019 07:45    128    |  94     |  30     ----------------------------<  137    4.2     |  27     |  0.85   09 Oct 2019 07:20    131    |  93     |  33     ----------------------------<  119    4.7     |  30     |  1.03   08 Oct 2019 06:26    132    |  94     |  35     ----------------------------<  115    3.0     |  28     |  0.78     Ca    9.2        10 Oct 2019 07:45    TPro  7.3    /  Alb  3.4    /  TBili  0.8    /  DBili  x      /  AST  35     /  ALT  38     /  AlkPhos  96     10 Oct 2019 07:45                          11.7   8.90  )-----------( 329      ( 10 Oct 2019 07:45 )             38.0 ,                       10.9   9.87  )-----------( 401      ( 09 Oct 2019 07:20 )             35.6

## 2019-10-10 NOTE — CHART NOTE - NSCHARTNOTEFT_GEN_A_CORE
Called by nurse to evaluate patient for SOB, patient admitted w/ CHF, currently sitting on chair SOB on nasal cannula saturating above 94%    HPI:  84 YO F with a PMH of CAD s/p CABG, GERD, HFpEF, HTN OA s/p bilateral TKR presents to the ED for five days of shortness of breath.  Patient reports increased dyspnea on exertion and at rest during that time.  Patient reports cough with some clear to white sputum.  Patient reports that she frequently has orthopnea and has noticed her legs swelling more recently.  Patient denies sick contacts at home, stating she lives alone but her sons are currently visiting.  Denies chest pain, palpitations, nausea, vomiting, diaphroesis, dizziness, or lightheadedness. (04 Oct 2019 22:19)      acetaminophen   Tablet .. 650 milliGRAM(s) Oral every 6 hours PRN  ALBUTerol    90 MICROgram(s) HFA Inhaler 1 Puff(s) Inhalation every 4 hours PRN  ALBUTerol/ipratropium for Nebulization 3 milliLiter(s) Nebulizer every 6 hours PRN  apixaban 2.5 milliGRAM(s) Oral every 12 hours  aspirin enteric coated 81 milliGRAM(s) Oral daily  atorvastatin 20 milliGRAM(s) Oral at bedtime  Biotene Dry Mouth Oral Rinse 5 milliLiter(s) Swish and Spit daily  buDESOnide  80 MICROgram(s)/formoterol 4.5 MICROgram(s) Inhaler 2 Puff(s) Inhalation two times a day  diltiazem    Tablet 30 milliGRAM(s) Oral every 6 hours  docusate sodium 100 milliGRAM(s) Oral three times a day  FLUoxetine 40 milliGRAM(s) Oral daily  furosemide   Injectable 60 milliGRAM(s) IV Push every 12 hours  influenza   Vaccine 0.5 milliLiter(s) IntraMuscular once  lisinopril 20 milliGRAM(s) Oral daily  metoprolol tartrate 50 milliGRAM(s) Oral two times a day  pantoprazole    Tablet 40 milliGRAM(s) Oral before breakfast  zolpidem 5 milliGRAM(s) Oral at bedtime PRN      T(F): 97.8 (10-10-19 @ 05:21), Max: 98.6 (10-10-19 @ 02:06)  HR: 116 (10-10-19 @ 05:21) (82 - 133)  BP: 162/96 (10-10-19 @ 05:21) (150/69 - 204/118)  RR: 20 (10-10-19 @ 05:21) (20 - 22)  SpO2: 94% (10-10-19 @ 05:21) (93% - 99%)  Wt(kg): --    PHYSICAL EXAM:  General: NAD, WDWN.   Neuro:  Alert & oriented x 3  HEENT: NCAT, EOMI, conjunctiva clear  CV: +irregular Irregular  Lung: good airway movement, +wheezing decreased breath sounds to base of lung  Abdomen: soft, NTND. Normactive BS  Extremities: b/l pedal edema 3+    LABS:                        11.7   8.90  )-----------( 329      ( 10 Oct 2019 07:45 )             38.0     10-10    128<L>  |  94<L>  |  30<H>  ----------------------------<  137<H>  4.2   |  27  |  0.85    Ca    9.2      10 Oct 2019 07:45    TPro  7.3  /  Alb  3.4  /  TBili  0.8  /  DBili  x   /  AST  35  /  ALT  38  /  AlkPhos  96  10-10                Assessment:  HPI:  84 YO F with a PMH of CAD s/p CABG, GERD, HFpEF, HTN OA s/p bilateral TKR presents to the ED for five days of shortness of breath.  Patient reports increased dyspnea on exertion and at rest during that time.  Patient reports cough with some clear to white sputum.  Patient reports that she frequently has orthopnea and has noticed her legs swelling more recently.  Patient denies sick contacts at home, stating she lives alone but her sons are currently visiting.  Denies chest pain, palpitations, nausea, vomiting, diaphroesis, dizziness, or lightheadedness. (04 Oct 2019 22:19)      Coronary bypass graft mechanical complication  ST elevation myocardial infarction (STEMI), unspecified artery  BETSY (acute kidney injury)  Insomnia, unspecified type  Gastroesophageal reflux disease without esophagitis  Acute congestive heart failure, unspecified congestive heart failure type  Hypertension      Plan:  - Lasix 40mg IV x stat  - monitor I/O, discussed w/ patient regarding primafit agreeable  - CXR x stat  - renal and cardio at bedside, will increase Lasix 60mg IV BID, d/c Zaroxolyn as per renal due to hyponatremia Called by nurse to evaluate patient for SOB, patient admitted w/ CHF, currently sitting on chair SOB on nasal cannula saturating above 94%.    HPI:  84 YO F with a PMH of CAD s/p CABG, GERD, HFpEF, HTN OA s/p bilateral TKR presents to the ED for five days of shortness of breath.  Patient reports increased dyspnea on exertion and at rest during that time.  Patient reports cough with some clear to white sputum.  Patient reports that she frequently has orthopnea and has noticed her legs swelling more recently.  Patient denies sick contacts at home, stating she lives alone but her sons are currently visiting.  Denies chest pain, palpitations, nausea, vomiting, diaphroesis, dizziness, or lightheadedness. (04 Oct 2019 22:19)      acetaminophen   Tablet .. 650 milliGRAM(s) Oral every 6 hours PRN  ALBUTerol    90 MICROgram(s) HFA Inhaler 1 Puff(s) Inhalation every 4 hours PRN  ALBUTerol/ipratropium for Nebulization 3 milliLiter(s) Nebulizer every 6 hours PRN  apixaban 2.5 milliGRAM(s) Oral every 12 hours  aspirin enteric coated 81 milliGRAM(s) Oral daily  atorvastatin 20 milliGRAM(s) Oral at bedtime  Biotene Dry Mouth Oral Rinse 5 milliLiter(s) Swish and Spit daily  buDESOnide  80 MICROgram(s)/formoterol 4.5 MICROgram(s) Inhaler 2 Puff(s) Inhalation two times a day  diltiazem    Tablet 30 milliGRAM(s) Oral every 6 hours  docusate sodium 100 milliGRAM(s) Oral three times a day  FLUoxetine 40 milliGRAM(s) Oral daily  furosemide   Injectable 60 milliGRAM(s) IV Push every 12 hours  influenza   Vaccine 0.5 milliLiter(s) IntraMuscular once  lisinopril 20 milliGRAM(s) Oral daily  metoprolol tartrate 50 milliGRAM(s) Oral two times a day  pantoprazole    Tablet 40 milliGRAM(s) Oral before breakfast  zolpidem 5 milliGRAM(s) Oral at bedtime PRN      T(F): 97.8 (10-10-19 @ 05:21), Max: 98.6 (10-10-19 @ 02:06)  HR: 116 (10-10-19 @ 05:21) (82 - 133)  BP: 162/96 (10-10-19 @ 05:21) (150/69 - 204/118)  RR: 20 (10-10-19 @ 05:21) (20 - 22)  SpO2: 94% (10-10-19 @ 05:21) (93% - 99%)  Wt(kg): --    PHYSICAL EXAM:  General: NAD, WDWN.   Neuro:  Alert & oriented x 3  HEENT: NCAT, EOMI, conjunctiva clear  CV: +irregular Irregular  Lung: good airway movement, +wheezing decreased breath sounds to base of lung  Abdomen: soft, NTND. Normactive BS  Extremities: b/l pedal edema 3+    LABS:                        11.7   8.90  )-----------( 329      ( 10 Oct 2019 07:45 )             38.0     10-10    128<L>  |  94<L>  |  30<H>  ----------------------------<  137<H>  4.2   |  27  |  0.85    Ca    9.2      10 Oct 2019 07:45    TPro  7.3  /  Alb  3.4  /  TBili  0.8  /  DBili  x   /  AST  35  /  ALT  38  /  AlkPhos  96  10-10                Assessment:  HPI:  84 YO F with a PMH of CAD s/p CABG, GERD, HFpEF, HTN OA s/p bilateral TKR presents to the ED for five days of shortness of breath.  Patient reports increased dyspnea on exertion and at rest during that time.  Patient reports cough with some clear to white sputum.  Patient reports that she frequently has orthopnea and has noticed her legs swelling more recently.  Patient denies sick contacts at home, stating she lives alone but her sons are currently visiting.  Denies chest pain, palpitations, nausea, vomiting, diaphroesis, dizziness, or lightheadedness. (04 Oct 2019 22:19)      Coronary bypass graft mechanical complication  ST elevation myocardial infarction (STEMI), unspecified artery  BETSY (acute kidney injury)  Insomnia, unspecified type  Gastroesophageal reflux disease without esophagitis  Acute congestive heart failure, unspecified congestive heart failure type  Hypertension      Plan:  - Lasix 40mg IV x stat  - monitor I/O, discussed w/ patient regarding primafit agreeable  - CXR x stat  - renal and cardio at bedside, will increase Lasix 60mg IV BID, d/c Zaroxolyn as per renal due to hyponatremia

## 2019-10-10 NOTE — CONSULT NOTE ADULT - SUBJECTIVE AND OBJECTIVE BOX
HPI:  Patient is a 85y old  Female with G3DD, mod MR admitted on Oct 4the with HF exacerbation. She developed low grade hypoNa since admission with serum Na declining from an admission value of 134 to 128 this am. Zaroxolyn was added this morning to augment loop diuresis due to refractory fluid overload. She is complaining of weakness.   Extra dose of IV lasix ordered earlier this am due to worsening dyspnea, wheezing.         PAST MEDICAL & SURGICAL HISTORY:  Coronary bypass graft mechanical complication  ST elevation myocardial infarction (STEMI), unspecified artery  BETSY (acute kidney injury)  Insomnia, unspecified type  Gastroesophageal reflux disease without esophagitis  Acute congestive heart failure, unspecified congestive heart failure type  Hypertension  S/P CABG x 1: 9 days ago  S/P TKR (total knee replacement) using cement, bilateral      Social Hx: not a smoker    FAMILY HISTORY:  No pertinent family history in first degree relatives      Allergies    Talwin (Nausea)    Intolerances    lactose (Headache; Vomiting)  Milk (Vomiting)      MEDICATIONS  (STANDING):  apixaban 2.5 milliGRAM(s) Oral every 12 hours  aspirin enteric coated 81 milliGRAM(s) Oral daily  atorvastatin 20 milliGRAM(s) Oral at bedtime  Biotene Dry Mouth Oral Rinse 5 milliLiter(s) Swish and Spit daily  buDESOnide  80 MICROgram(s)/formoterol 4.5 MICROgram(s) Inhaler 2 Puff(s) Inhalation two times a day  diltiazem    Tablet 30 milliGRAM(s) Oral every 6 hours  docusate sodium 100 milliGRAM(s) Oral three times a day  FLUoxetine 40 milliGRAM(s) Oral daily  furosemide   Injectable 40 milliGRAM(s) IV Push two times a day  influenza   Vaccine 0.5 milliLiter(s) IntraMuscular once  lisinopril 20 milliGRAM(s) Oral daily  metoprolol tartrate 50 milliGRAM(s) Oral two times a day  pantoprazole    Tablet 40 milliGRAM(s) Oral before breakfast    MEDICATIONS  (PRN):  acetaminophen   Tablet .. 650 milliGRAM(s) Oral every 6 hours PRN Temp greater or equal to 38C (100.4F), Moderate Pain (4 - 6)  ALBUTerol    90 MICROgram(s) HFA Inhaler 1 Puff(s) Inhalation every 4 hours PRN Shortness of Breath and/or Wheezing  ALBUTerol/ipratropium for Nebulization 3 milliLiter(s) Nebulizer every 6 hours PRN Shortness of Breath and/or Wheezing  zolpidem 5 milliGRAM(s) Oral at bedtime PRN Insomnia      Daily     Daily Weight in k.1 (10 Oct 2019 05:21)    Drug Dosing Weight  Height (cm): 154.9 (05 Oct 2019 00:39)  Weight (kg): 57.2 (04 Oct 2019 14:07)  BMI (kg/m2): 23.8 (05 Oct 2019 00:39)  BSA (m2): 1.55 (05 Oct 2019 00:39)      REVIEW OF SYSTEMS:    CONSTITUTIONAL: fatigue  ENMT:  No difficulty hearing, tinnitus, vertigo; No sinus or throat pain  NECK: No pain or stiffness  RESPIRATORY: wheezing, SOB  CARDIOVASCULAR: No chest pain, palpitations. POS LE edema  GASTROINTESTINAL: No abdominal or epigastric pain. No nausea, vomiting, or hematemesis; No diarrhea or constipation. No melena or hematochezia.  GENITOURINARY: No dysuria, frequency, hematuria, or incontinence  SKIN: No itching, burning, rashes, or lesions   LYMPH NODES: No enlarged glands  NEURO: no asterixis            I&O's Detail    09 Oct 2019 07:  -  10 Oct 2019 07:00  --------------------------------------------------------  IN:    Oral Fluid: 120 mL  Total IN: 120 mL    OUT:  Total OUT: 0 mL    Total NET: 120 mL          10-09 @ 07:  -  10-10 @ 07:00  --------------------------------------------------------  IN: 120 mL / OUT: 0 mL / NET: 120 mL        PHYSICAL EXAM:    GENERAL: dyspneic  HEAD:  Atraumatic, Normocephalic  EYES: EOMI, PERRLA, conjunctiva and sclera clear  ENMT: No tonsillar erythema, exudates, or enlargement; Moist mucous membranes, Good dentition, No lesions  NECK: Supple, No JVD, Normal thyroid  NERVOUS SYSTEM:  Alert & Oriented X3, Good concentration; Motor Strength 5/5 B/L upper and lower extremities; DTRs 2+ intact and symmetric  CHEST/LUNG: basilar crackles  HEART: Regular rate and rhythm; No murmurs, rubs, or gallops  ABDOMEN: Soft, Nontender, Nondistended; Bowel sounds present  EXTREMITIES:  pos edema  LYMPH: No lymphadenopathy noted  SKIN: No rashes or lesions    LABS:  CBC Full  -  ( 10 Oct 2019 07:45 )  WBC Count : 8.90 K/uL  RBC Count : 4.82 M/uL  Hemoglobin : 11.7 g/dL  Hematocrit : 38.0 %  Platelet Count - Automated : 329 K/uL  Mean Cell Volume : 78.8 fl  Mean Cell Hemoglobin : 24.3 pg  Mean Cell Hemoglobin Concentration : 30.8 gm/dL  Auto Neutrophil # : x  Auto Lymphocyte # : x  Auto Monocyte # : x  Auto Eosinophil # : x  Auto Basophil # : x  Auto Neutrophil % : x  Auto Lymphocyte % : x  Auto Monocyte % : x  Auto Eosinophil % : x  Auto Basophil % : x    10-10    128<L>  |  94<L>  |  30<H>  ----------------------------<  137<H>  4.2   |  27  |  0.85    Ca    9.2      10 Oct 2019 07:45    TPro  7.3  /  Alb  3.4  /  TBili  0.8  /  DBili  x   /  AST  35  /  ALT  38  /  AlkPhos  96  10-10    CAPILLARY BLOOD GLUCOSE          Impression:  * HypoNa -- hypervolemia, pl effusion, ? component of salt wasting  * G3DD, mod MR, refractory fluid overload  * B/L effusions  * COPD    Recommendations:   * D/c Zaroxolyn due to its salt wasting effect  * Incr lasix to 60mg IV BID  * Repeat CXR  * Uric acid, TFTs  * FR 1000cc/day

## 2019-10-10 NOTE — PROGRESS NOTE ADULT - ASSESSMENT
84 YO F with a PMH of CAD s/p CABG, HFpEF, HTN, GERD, OA s/p bilateral TKR presents to the ED for five days of shortness of breath.  Patient reports increased dyspnea on exertion and at rest during that time.  Patient reports cough with some clear to white sputum, also reports that she frequently has orthopnea and has noticed her legs swelling more recently.  Patient denies sick contacts at home, stating she lives alone but her sons are currently visiting.  Denies chest pain, palpitations, nausea, vomiting, diaphoresis, dizziness, or lightheadedness.   Found to be in rapid Afib with HRs 130-140s, started on Cardizem gtt with HRs improved with 70-90s  Kellie neg x 1  Echo 8/2019: LVEF 60%, DD, mild-mod MR, mod AI, mild MS/pHTN  ECG on admit:  afib w RVR 137bpm; lateral ST depressions   BNP (24472 ->8900) -> 14,000 on this admission  Tele: afib 70-90s    ·	Acute on chronic diastolic HF, EF 55-60%, Gr III DD  ·	Atrial fibrillation with RVR  ·	B/L pleural effusions  ·	Pulmonary HTN- mild  ·	CAD Hx, s/p CABG  ·	HTN  ·	GERD  ·	Hypokalemia  ·	Hyponatremia  ·	VHD ( mild-mod MR, mod AR, mild MS)    Plan  -Cont with tele  -IV Lasix increased to 60mg BID by Renal  - Repeat CXR pending  to assess pleural effusion  -Cont Eliquis for Afib, monitor h/h  -CHF teaching, seen by dietician, fluid and salt restriction   -Monitor renal function/electrolytes/daily weights-no weight loss to date.  -Cont with Cardizem 30 mg Q6H for rate control along with bbl  -cont with supplemental O2/Duoneb, assess need for O2, check pulse oximetry on room air  -cont asa/statin/lisinopril/metoprolol  -monitor lytes/creat with diuresis, replete as needed  -Renal for Hyponatremia 128, FR 1000ml/24 hrs  -cont with PPI  -Activity as tolerated

## 2019-10-10 NOTE — PROGRESS NOTE ADULT - SUBJECTIVE AND OBJECTIVE BOX
84 yo lady better than this AM. Lasix increased to 60mg    Vital Signs Last 24 Hrs  T(C): 36.7 (10 Oct 2019 17:14), Max: 37 (10 Oct 2019 02:06)  T(F): 98 (10 Oct 2019 17:14), Max: 98.6 (10 Oct 2019 02:06)  HR: 96 (10 Oct 2019 17:14) (82 - 133)  BP: 143/84 (10 Oct 2019 17:14) (143/84 - 204/118)  BP(mean): --  RR: 18 (10 Oct 2019 17:14) (18 - 22)  SpO2: 100% (10 Oct 2019 17:14) (93% - 100%)                          11.7   8.90  )-----------( 329      ( 10 Oct 2019 07:45 )             38.0     10-10    128<L>  |  94<L>  |  30<H>  ----------------------------<  137<H>  4.2   |  27  |  0.85    Ca    9.2      10 Oct 2019 07:45    TPro  7.3  /  Alb  3.4  /  TBili  0.8  /  DBili  x   /  AST  35  /  ALT  38  /  AlkPhos  96  10-10    MEDICATIONS  (STANDING):  apixaban 2.5 milliGRAM(s) Oral every 12 hours  aspirin enteric coated 81 milliGRAM(s) Oral daily  atorvastatin 20 milliGRAM(s) Oral at bedtime  Biotene Dry Mouth Oral Rinse 5 milliLiter(s) Swish and Spit daily  buDESOnide  80 MICROgram(s)/formoterol 4.5 MICROgram(s) Inhaler 2 Puff(s) Inhalation two times a day  diltiazem    Tablet 30 milliGRAM(s) Oral every 6 hours  docusate sodium 100 milliGRAM(s) Oral three times a day  FLUoxetine 40 milliGRAM(s) Oral daily  furosemide   Injectable 60 milliGRAM(s) IV Push every 12 hours  influenza   Vaccine 0.5 milliLiter(s) IntraMuscular once  lisinopril 20 milliGRAM(s) Oral daily  metoprolol tartrate 50 milliGRAM(s) Oral two times a day  pantoprazole    Tablet 40 milliGRAM(s) Oral before breakfast    MEDICATIONS  (PRN):  acetaminophen   Tablet .. 650 milliGRAM(s) Oral every 6 hours PRN Temp greater or equal to 38C (100.4F), Moderate Pain (4 - 6)  ALBUTerol    90 MICROgram(s) HFA Inhaler 1 Puff(s) Inhalation every 4 hours PRN Shortness of Breath and/or Wheezing  ALBUTerol/ipratropium for Nebulization 3 milliLiter(s) Nebulizer every 6 hours PRN Shortness of Breath and/or Wheezing  zolpidem 5 milliGRAM(s) Oral at bedtime PRN Insomnia

## 2019-10-11 LAB
ANION GAP SERPL CALC-SCNC: 7 MMOL/L — SIGNIFICANT CHANGE UP (ref 5–17)
BUN SERPL-MCNC: 28 MG/DL — HIGH (ref 7–23)
CALCIUM SERPL-MCNC: 8.7 MG/DL — SIGNIFICANT CHANGE UP (ref 8.5–10.1)
CHLORIDE SERPL-SCNC: 94 MMOL/L — LOW (ref 96–108)
CO2 SERPL-SCNC: 34 MMOL/L — HIGH (ref 22–31)
CREAT SERPL-MCNC: 0.78 MG/DL — SIGNIFICANT CHANGE UP (ref 0.5–1.3)
GLUCOSE SERPL-MCNC: 105 MG/DL — HIGH (ref 70–99)
HCT VFR BLD CALC: 35.4 % — SIGNIFICANT CHANGE UP (ref 34.5–45)
HGB BLD-MCNC: 10.8 G/DL — LOW (ref 11.5–15.5)
MCHC RBC-ENTMCNC: 23.6 PG — LOW (ref 27–34)
MCHC RBC-ENTMCNC: 30.5 GM/DL — LOW (ref 32–36)
MCV RBC AUTO: 77.5 FL — LOW (ref 80–100)
NRBC # BLD: 0 /100 WBCS — SIGNIFICANT CHANGE UP (ref 0–0)
PLATELET # BLD AUTO: 319 K/UL — SIGNIFICANT CHANGE UP (ref 150–400)
POTASSIUM SERPL-MCNC: 3.3 MMOL/L — LOW (ref 3.5–5.3)
POTASSIUM SERPL-SCNC: 3.3 MMOL/L — LOW (ref 3.5–5.3)
RBC # BLD: 4.57 M/UL — SIGNIFICANT CHANGE UP (ref 3.8–5.2)
RBC # FLD: 15.9 % — HIGH (ref 10.3–14.5)
SODIUM SERPL-SCNC: 135 MMOL/L — SIGNIFICANT CHANGE UP (ref 135–145)
WBC # BLD: 8.77 K/UL — SIGNIFICANT CHANGE UP (ref 3.8–10.5)
WBC # FLD AUTO: 8.77 K/UL — SIGNIFICANT CHANGE UP (ref 3.8–10.5)

## 2019-10-11 PROCEDURE — 99232 SBSQ HOSP IP/OBS MODERATE 35: CPT

## 2019-10-11 RX ORDER — POTASSIUM CHLORIDE 20 MEQ
40 PACKET (EA) ORAL EVERY 4 HOURS
Refills: 0 | Status: COMPLETED | OUTPATIENT
Start: 2019-10-11 | End: 2019-10-11

## 2019-10-11 RX ORDER — POTASSIUM CHLORIDE 20 MEQ
20 PACKET (EA) ORAL
Refills: 0 | Status: COMPLETED | OUTPATIENT
Start: 2019-10-11 | End: 2019-10-11

## 2019-10-11 RX ADMIN — Medication 30 MILLIGRAM(S): at 06:17

## 2019-10-11 RX ADMIN — Medication 20 MILLIEQUIVALENT(S): at 23:56

## 2019-10-11 RX ADMIN — Medication 30 MILLIGRAM(S): at 23:57

## 2019-10-11 RX ADMIN — Medication 50 MILLIGRAM(S): at 06:17

## 2019-10-11 RX ADMIN — Medication 40 MILLIEQUIVALENT(S): at 15:11

## 2019-10-11 RX ADMIN — ATORVASTATIN CALCIUM 20 MILLIGRAM(S): 80 TABLET, FILM COATED ORAL at 21:28

## 2019-10-11 RX ADMIN — Medication 60 MILLIGRAM(S): at 06:17

## 2019-10-11 RX ADMIN — ZOLPIDEM TARTRATE 5 MILLIGRAM(S): 10 TABLET ORAL at 21:31

## 2019-10-11 RX ADMIN — Medication 100 MILLIGRAM(S): at 06:17

## 2019-10-11 RX ADMIN — Medication 30 MILLIGRAM(S): at 00:15

## 2019-10-11 RX ADMIN — Medication 30 MILLIGRAM(S): at 18:14

## 2019-10-11 RX ADMIN — Medication 60 MILLIGRAM(S): at 18:14

## 2019-10-11 RX ADMIN — Medication 20 MILLIEQUIVALENT(S): at 21:27

## 2019-10-11 RX ADMIN — Medication 30 MILLIGRAM(S): at 15:11

## 2019-10-11 RX ADMIN — Medication 40 MILLIGRAM(S): at 15:11

## 2019-10-11 RX ADMIN — Medication 40 MILLIEQUIVALENT(S): at 18:11

## 2019-10-11 RX ADMIN — LISINOPRIL 20 MILLIGRAM(S): 2.5 TABLET ORAL at 06:17

## 2019-10-11 RX ADMIN — Medication 50 MILLIGRAM(S): at 18:13

## 2019-10-11 RX ADMIN — Medication 100 MILLIGRAM(S): at 21:28

## 2019-10-11 RX ADMIN — PANTOPRAZOLE SODIUM 40 MILLIGRAM(S): 20 TABLET, DELAYED RELEASE ORAL at 06:28

## 2019-10-11 RX ADMIN — BUDESONIDE AND FORMOTEROL FUMARATE DIHYDRATE 2 PUFF(S): 160; 4.5 AEROSOL RESPIRATORY (INHALATION) at 06:18

## 2019-10-11 RX ADMIN — Medication 100 MILLIGRAM(S): at 15:15

## 2019-10-11 RX ADMIN — APIXABAN 2.5 MILLIGRAM(S): 2.5 TABLET, FILM COATED ORAL at 18:11

## 2019-10-11 RX ADMIN — BUDESONIDE AND FORMOTEROL FUMARATE DIHYDRATE 2 PUFF(S): 160; 4.5 AEROSOL RESPIRATORY (INHALATION) at 18:13

## 2019-10-11 RX ADMIN — APIXABAN 2.5 MILLIGRAM(S): 2.5 TABLET, FILM COATED ORAL at 06:17

## 2019-10-11 RX ADMIN — Medication 5 MILLILITER(S): at 15:11

## 2019-10-11 RX ADMIN — Medication 81 MILLIGRAM(S): at 15:12

## 2019-10-11 NOTE — PROGRESS NOTE ADULT - ASSESSMENT
86 YO F with a PMH of CAD s/p CABG, HFpEF, HTN, GERD, OA s/p bilateral TKR presents to the ED for five days of shortness of breath.  Patient reports increased dyspnea on exertion and at rest during that time.  Patient reports cough with some clear to white sputum, also reports that she frequently has orthopnea and has noticed her legs swelling more recently.  Patient denies sick contacts at home, stating she lives alone but her sons are currently visiting.  Denies chest pain, palpitations, nausea, vomiting, diaphoresis, dizziness, or lightheadedness.   Found to be in rapid Afib with HRs 130-140s, started on Cardizem gtt with HRs improved with 70-90s  Kellie neg x 1  Echo 8/2019: LVEF 60%, DD, mild-mod MR, mod AI, mild MS/pHTN  ECG on admit:  afib w RVR 137bpm; lateral ST depressions   BNP (91292 ->8900) -> 14,000 on this admission  Tele: afib 70-90s    ·	Acute on chronic diastolic HF, EF 55-60%, Gr III DD  ·	Atrial fibrillation with RVR  ·	B/L pleural effusions  ·	Pulmonary HTN- mild  ·	CAD Hx, s/p CABG  ·	HTN  ·	GERD  ·	Hypokalemia  ·	Hyponatremia  ·	VHD ( mild-mod MR, mod AR, mild MS)    Plan  -Cont with tele  - cont IV Lasix increased to 60mg BID, significant LE edema persist, diuresis until euvolemic   -Cont Eliquis for Afib, monitor h/h  -CHF teaching, seen by dietician, fluid and salt restriction   -Monitor renal function/electrolytes/daily weights  -Cont with Cardizem 30 mg Q6H for rate control along with Metoprolol  -cont with supplemental O2/Duoneb, assess need for O2, check pulse oximetry on room air  -cont asa/statin/lisinopril/metoprolol  -monitor lytes/creat with diuresis, replete as needed  -Renal for Hyponatremia 128--> 135, FR 1000ml/24 hrs  -cont with PPI  -Activity as tolerated

## 2019-10-11 NOTE — PROGRESS NOTE ADULT - SUBJECTIVE AND OBJECTIVE BOX
86 yo lady slowly improving    Vital Signs Last 24 Hrs  T(C): 36.3 (11 Oct 2019 10:43), Max: 36.6 (11 Oct 2019 04:44)  T(F): 97.4 (11 Oct 2019 10:43), Max: 97.8 (11 Oct 2019 04:44)  HR: 90 (11 Oct 2019 10:43) (82 - 90)  BP: 155/79 (11 Oct 2019 10:43) (130/77 - 162/81)  BP(mean): --  RR: 18 (11 Oct 2019 10:43) (18 - 18)  SpO2: 99% (11 Oct 2019 10:43) (97% - 100%)                          10.8   8.77  )-----------( 319      ( 11 Oct 2019 10:08 )             35.4     10-11    135  |  94<L>  |  28<H>  ----------------------------<  105<H>  3.3<L>   |  34<H>  |  0.78    Ca    8.7      11 Oct 2019 10:08    TPro  7.3  /  Alb  3.4  /  TBili  0.8  /  DBili  x   /  AST  35  /  ALT  38  /  AlkPhos  96  10-10    MEDICATIONS  (STANDING):  apixaban 2.5 milliGRAM(s) Oral every 12 hours  aspirin enteric coated 81 milliGRAM(s) Oral daily  atorvastatin 20 milliGRAM(s) Oral at bedtime  Biotene Dry Mouth Oral Rinse 5 milliLiter(s) Swish and Spit daily  buDESOnide  80 MICROgram(s)/formoterol 4.5 MICROgram(s) Inhaler 2 Puff(s) Inhalation two times a day  diltiazem    Tablet 30 milliGRAM(s) Oral every 6 hours  docusate sodium 100 milliGRAM(s) Oral three times a day  FLUoxetine 40 milliGRAM(s) Oral daily  furosemide   Injectable 60 milliGRAM(s) IV Push every 12 hours  influenza   Vaccine 0.5 milliLiter(s) IntraMuscular once  lisinopril 20 milliGRAM(s) Oral daily  metoprolol tartrate 50 milliGRAM(s) Oral two times a day  pantoprazole    Tablet 40 milliGRAM(s) Oral before breakfast  potassium chloride    Tablet ER 40 milliEquivalent(s) Oral every 4 hours    MEDICATIONS  (PRN):  acetaminophen   Tablet .. 650 milliGRAM(s) Oral every 6 hours PRN Temp greater or equal to 38C (100.4F), Moderate Pain (4 - 6)  ALBUTerol    90 MICROgram(s) HFA Inhaler 1 Puff(s) Inhalation every 4 hours PRN Shortness of Breath and/or Wheezing  ALBUTerol/ipratropium for Nebulization 3 milliLiter(s) Nebulizer every 6 hours PRN Shortness of Breath and/or Wheezing  zolpidem 5 milliGRAM(s) Oral at bedtime PRN Insomnia

## 2019-10-11 NOTE — PROGRESS NOTE ADULT - SUBJECTIVE AND OBJECTIVE BOX
Patient is a 85y old  Female who presents with a chief complaint of dyspnea (10 Oct 2019 17:38)    PAST MEDICAL & SURGICAL HISTORY:  Coronary bypass graft mechanical complication  ST elevation myocardial infarction (STEMI), unspecified artery  BETSY (acute kidney injury)  Insomnia, unspecified type  Gastroesophageal reflux disease without esophagitis  Acute congestive heart failure, unspecified congestive heart failure type  Hypertension  S/P CABG x 1: 9 days ago  S/P TKR (total knee replacement) using cement, bilateral    INTERVAL HISTORY: Patient in bed sleeping, O2 NC in use, reports improvement in dyspnea. Denies chest pain and palpitations.  	  MEDICATIONS:  MEDICATIONS  (STANDING):  apixaban 2.5 milliGRAM(s) Oral every 12 hours  aspirin enteric coated 81 milliGRAM(s) Oral daily  atorvastatin 20 milliGRAM(s) Oral at bedtime  Biotene Dry Mouth Oral Rinse 5 milliLiter(s) Swish and Spit daily  buDESOnide  80 MICROgram(s)/formoterol 4.5 MICROgram(s) Inhaler 2 Puff(s) Inhalation two times a day  diltiazem    Tablet 30 milliGRAM(s) Oral every 6 hours  docusate sodium 100 milliGRAM(s) Oral three times a day  FLUoxetine 40 milliGRAM(s) Oral daily  furosemide   Injectable 60 milliGRAM(s) IV Push every 12 hours  influenza   Vaccine 0.5 milliLiter(s) IntraMuscular once  lisinopril 20 milliGRAM(s) Oral daily  metoprolol tartrate 50 milliGRAM(s) Oral two times a day  pantoprazole    Tablet 40 milliGRAM(s) Oral before breakfast  potassium chloride    Tablet ER 40 milliEquivalent(s) Oral every 4 hours    MEDICATIONS  (PRN):  acetaminophen   Tablet .. 650 milliGRAM(s) Oral every 6 hours PRN Temp greater or equal to 38C (100.4F), Moderate Pain (4 - 6)  ALBUTerol    90 MICROgram(s) HFA Inhaler 1 Puff(s) Inhalation every 4 hours PRN Shortness of Breath and/or Wheezing  ALBUTerol/ipratropium for Nebulization 3 milliLiter(s) Nebulizer every 6 hours PRN Shortness of Breath and/or Wheezing  zolpidem 5 milliGRAM(s) Oral at bedtime PRN Insomnia    Vitals:  T(F): 97.4 (10-11-19 @ 10:43), Max: 98 (10-10-19 @ 17:14)  HR: 90 (10-11-19 @ 10:43) (82 - 96)  BP: 155/79 (10-11-19 @ 10:43) (130/77 - 162/81)  RR: 18 (10-11-19 @ 10:43) (18 - 18)  SpO2: 99% (10-11-19 @ 10:43) (97% - 100%)  Wt(kg): --63.1 Kg--> 60.5 kg    10-10 @ 07:01  -  10-11 @ 07:00  --------------------------------------------------------  IN:    Oral Fluid: 120 mL  Total IN: 120 mL    OUT:    Voided: 806 mL  Total OUT: 806 mL    Total NET: -686 mL      10-11 @ 07:01  -  10-11 @ 15:11  --------------------------------------------------------  IN:    Oral Fluid: 480 mL  Total IN: 480 mL    OUT:    Voided: 1 mL  Total OUT: 1 mL    Total NET: 479 mL    PHYSICAL EXAM:  Neuro: Awake, responsive  CV: S1 S2 irregular   Lungs: Diminished at bases  GI: Soft, BS +, ND, NT  Extremities: Satish LE edema    TELEMETRY: AFIB 80  	    RADIOLOGY: < from: Xray Chest 1 View- PORTABLE-Routine (10.10.19 @ 11:50) >  INTERPRETATION:  AP semierect chest on October 10, 2019 at 11:19 AM.   Patient is short of breath.    Heart is likely enlarged. Sternotomy noted.    There is likely a hiatal hernia.    Moderate to large right effusion has increased from October 4. There is   also an increasing mild left effusion.    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    IMPRESSION: Increasing effusions.    < end of copied text >      DIAGNOSTIC TESTING:    [ ] Echocardiogram:  [ ]  Catheterization:  [ ] Stress Test:    OTHER: 	2    LABS:	 	    CARDIAC MARKERS:          11 Oct 2019 10:08    135    |  94     |  28     ----------------------------<  105    3.3     |  34     |  0.78   10 Oct 2019 07:45    128    |  94     |  30     ----------------------------<  137    4.2     |  27     |  0.85   09 Oct 2019 07:20    131    |  93     |  33     ----------------------------<  119    4.7     |  30     |  1.03     Ca    8.7        11 Oct 2019 10:08    TPro  7.3    /  Alb  3.4    /  TBili  0.8    /  DBili  x      /  AST  35     /  ALT  38     /  AlkPhos  96     10 Oct 2019 07:45                          10.8   8.77  )-----------( 319      ( 11 Oct 2019 10:08 )             35.4 ,                       11.7   8.90  )-----------( 329      ( 10 Oct 2019 07:45 )             38.0 ,                       10.9   9.87  )-----------( 401      ( 09 Oct 2019 07:20 )             35.6   proBNP:   Lipid Profile:   HgA1c:   TSH: Thyroid Stimulating Hormone, Serum: 1.260 uU/mL (10-10 @ 07:45) Patient is a 85y old  Female who presents with a chief complaint of dyspnea (10 Oct 2019 17:38)    PAST MEDICAL & SURGICAL HISTORY:  Coronary bypass graft mechanical complication  ST elevation myocardial infarction (STEMI), unspecified artery  BETSY (acute kidney injury)  Insomnia, unspecified type  Gastroesophageal reflux disease without esophagitis  Acute congestive heart failure, unspecified congestive heart failure type  Hypertension  S/P CABG x 1: 9 days ago  S/P TKR (total knee replacement) using cement, bilateral    INTERVAL HISTORY: Patient in bed sleeping, O2 NC in use, reports improvement in dyspnea. Denies chest pain and palpitations.  	  MEDICATIONS:  MEDICATIONS  (STANDING):  apixaban 2.5 milliGRAM(s) Oral every 12 hours  aspirin enteric coated 81 milliGRAM(s) Oral daily  atorvastatin 20 milliGRAM(s) Oral at bedtime  Biotene Dry Mouth Oral Rinse 5 milliLiter(s) Swish and Spit daily  buDESOnide  80 MICROgram(s)/formoterol 4.5 MICROgram(s) Inhaler 2 Puff(s) Inhalation two times a day  diltiazem    Tablet 30 milliGRAM(s) Oral every 6 hours  docusate sodium 100 milliGRAM(s) Oral three times a day  FLUoxetine 40 milliGRAM(s) Oral daily  furosemide   Injectable 60 milliGRAM(s) IV Push every 12 hours  influenza   Vaccine 0.5 milliLiter(s) IntraMuscular once  lisinopril 20 milliGRAM(s) Oral daily  metoprolol tartrate 50 milliGRAM(s) Oral two times a day  pantoprazole    Tablet 40 milliGRAM(s) Oral before breakfast  potassium chloride    Tablet ER 40 milliEquivalent(s) Oral every 4 hours    MEDICATIONS  (PRN):  acetaminophen   Tablet .. 650 milliGRAM(s) Oral every 6 hours PRN Temp greater or equal to 38C (100.4F), Moderate Pain (4 - 6)  ALBUTerol    90 MICROgram(s) HFA Inhaler 1 Puff(s) Inhalation every 4 hours PRN Shortness of Breath and/or Wheezing  ALBUTerol/ipratropium for Nebulization 3 milliLiter(s) Nebulizer every 6 hours PRN Shortness of Breath and/or Wheezing  zolpidem 5 milliGRAM(s) Oral at bedtime PRN Insomnia    Vitals:  T(F): 97.4 (10-11-19 @ 10:43), Max: 98 (10-10-19 @ 17:14)  HR: 90 (10-11-19 @ 10:43) (82 - 96)  BP: 155/79 (10-11-19 @ 10:43) (130/77 - 162/81)  RR: 18 (10-11-19 @ 10:43) (18 - 18)  SpO2: 99% (10-11-19 @ 10:43) (97% - 100%)  Wt(kg): --63.1 Kg--> 60.5 kg    10-10 @ 07:01  -  10-11 @ 07:00  --------------------------------------------------------  IN:    Oral Fluid: 120 mL  Total IN: 120 mL    OUT:    Voided: 806 mL  Total OUT: 806 mL    Total NET: -686 mL      10-11 @ 07:01  -  10-11 @ 15:11  --------------------------------------------------------  IN:    Oral Fluid: 480 mL  Total IN: 480 mL    OUT:    Voided: 1 mL  Total OUT: 1 mL    Total NET: 479 mL    PHYSICAL EXAM:  Neuro: Awake, responsive  CV: S1 S2 irregular   Lungs: Diminished at bases  GI: Soft, BS +, ND, NT  Extremities: Satish LE edema    TELEMETRY: AFIB 80  	    RADIOLOGY: < from: Xray Chest 1 View- PORTABLE-Routine (10.10.19 @ 11:50) >  INTERPRETATION:  AP semierect chest on October 10, 2019 at 11:19 AM.   Patient is short of breath.    Heart is likely enlarged. Sternotomy noted.    There is likely a hiatal hernia.    Moderate to large right effusion has increased from October 4. There is   also an increasing mild left effusion.    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    IMPRESSION: Increasing effusions.    < end of copied text >      DIAGNOSTIC TESTING:    [ x] Echocardiogram: < from: TTE Echo Doppler w/o Cont (08.23.19 @ 16:30) >  Summary:   1. Left ventricular ejection fraction, by visual estimation, is 55 to   60%.   2. Normal left ventricular internal cavity size.   3. Spectral Doppler shows restrictive pattern of left ventricular  myocardial filling (Grade III diastolic dysfunction).   4. Normal right ventricular size and function.   5. The left atrium is normal in size.   6. The right atrium is normal in size.   7. There is no evidence of pericardial effusion.   8. Mild to moderate mitral annular calcification.   9. Mild to moderate mitral valve regurgitation.  10. Thickening of the anterior and posterior mitral valve leaflets.  11. Structurally normal tricuspid valve, with normal leaflet excursion.  12. Moderate aorticregurgitation.  13. Structurally normal pulmonic valve, with normal leaflet excursion.  14. Estimated pulmonary artery systolic pressure is 47.9 mmHg assuming a   right atrial pressure of 10 mmHg, which is consistent with mild pulmonary   hypertension.  15. Mitral valve mean gradient is 4.9 mmHg consistent with mild mitral   stenosis.    < end of copied text >    [ x]  Catheterization: < from: Cardiac Cath Lab - Adult (04.18.16 @ 18:01) >  CORONARY VESSELS: The coronary circulation is right dominant.  LM:   --  Distal left main: There was a 60 % stenosis.  LAD:   --  Ostial LAD: There was a 90 % stenosis.  --  Mid LAD: There was a 90 % stenosis.  CX:   --  Proximal circumflex: There was a 90 % stenosis.  RCA:   --  Mid RCA: There was a 100 % stenosis.  COMPLICATIONS: There were no complications.  DIAGNOSTIC RECOMMENDATIONS: Consultation with a cardiac surgeon will be  obtained for coronary artery bypass grafting.    < end of copied text >    LABS:	 	      11 Oct 2019 10:08    135    |  94     |  28     ----------------------------<  105    3.3     |  34     |  0.78   10 Oct 2019 07:45    128    |  94     |  30     ----------------------------<  137    4.2     |  27     |  0.85   09 Oct 2019 07:20    131    |  93     |  33     ----------------------------<  119    4.7     |  30     |  1.03     Ca    8.7        11 Oct 2019 10:08    TPro  7.3    /  Alb  3.4    /  TBili  0.8    /  DBili  x      /  AST  35     /  ALT  38     /  AlkPhos  96     10 Oct 2019 07:45                          10.8   8.77  )-----------( 319      ( 11 Oct 2019 10:08 )             35.4 ,                       11.7   8.90  )-----------( 329      ( 10 Oct 2019 07:45 )             38.0 ,                       10.9   9.87  )-----------( 401      ( 09 Oct 2019 07:20 )             35.6   TSH: Thyroid Stimulating Hormone, Serum: 1.260 uU/mL (10-10 @ 07:45)

## 2019-10-11 NOTE — PROGRESS NOTE ADULT - SUBJECTIVE AND OBJECTIVE BOX
[facility] NEPHROLOGY SERVICES, [facility]  NEPHROLOGY AND HYPERTENSION  [address]  [address]  [place] [address]  [phone]    MD BIN HALL MD ANDREY GONCHARUK, MD MADHU KORRAPATI, MD YELENA ROSENBERG, MD BINNY KOSHY, MD CHRISTOPHER CAPUTO, MD EDWARD BOVER, MD          Patient comfortable no distress    MEDICATIONS  (STANDING):  apixaban 2.5 milliGRAM(s) Oral every 12 hours  aspirin enteric coated 81 milliGRAM(s) Oral daily  atorvastatin 20 milliGRAM(s) Oral at bedtime  Biotene Dry Mouth Oral Rinse 5 milliLiter(s) Swish and Spit daily  buDESOnide  80 MICROgram(s)/formoterol 4.5 MICROgram(s) Inhaler 2 Puff(s) Inhalation two times a day  diltiazem    Tablet 30 milliGRAM(s) Oral every 6 hours  docusate sodium 100 milliGRAM(s) Oral three times a day  FLUoxetine 40 milliGRAM(s) Oral daily  furosemide   Injectable 60 milliGRAM(s) IV Push every 12 hours  influenza   Vaccine 0.5 milliLiter(s) IntraMuscular once  lisinopril 20 milliGRAM(s) Oral daily  metoprolol tartrate 50 milliGRAM(s) Oral two times a day  pantoprazole    Tablet 40 milliGRAM(s) Oral before breakfast  potassium chloride    Tablet ER 40 milliEquivalent(s) Oral every 4 hours    MEDICATIONS  (PRN):  acetaminophen   Tablet .. 650 milliGRAM(s) Oral every 6 hours PRN Temp greater or equal to 38C (100.4F), Moderate Pain (4 - 6)  ALBUTerol    90 MICROgram(s) HFA Inhaler 1 Puff(s) Inhalation every 4 hours PRN Shortness of Breath and/or Wheezing  ALBUTerol/ipratropium for Nebulization 3 milliLiter(s) Nebulizer every 6 hours PRN Shortness of Breath and/or Wheezing  zolpidem 5 milliGRAM(s) Oral at bedtime PRN Insomnia      10-10-19 @ 07:01  -  10-11-19 @ 07:00  --------------------------------------------------------  IN: 120 mL / OUT: 806 mL / NET: -686 mL    10-11-19 @ 07:01  -  10-11-19 @ 16:11  --------------------------------------------------------  IN: 480 mL / OUT: 1 mL / NET: 479 mL      PHYSICAL EXAM:      T(C): 36.3 (10-11-19 @ 10:43), Max: 36.7 (10-10-19 @ 17:14)  HR: 90 (10-11-19 @ 10:43) (82 - 96)  BP: 155/79 (10-11-19 @ 10:43) (130/77 - 162/81)  RR: 18 (10-11-19 @ 10:43) (18 - 18)  SpO2: 99% (10-11-19 @ 10:43) (97% - 100%)  Wt(kg): --  Respiratory: scattered rhonchi anteriorly, decreased BS at bases  Cardiovascular: S1 S2  Gastrointestinal: soft NT ND +BS  Extremities:   2 edema                                    10.8   8.77  )-----------( 319      ( 11 Oct 2019 10:08 )             35.4     10-11    135  |  94<L>  |  28<H>  ----------------------------<  105<H>  3.3<L>   |  34<H>  |  0.78    Ca    8.7      11 Oct 2019 10:08    TPro  7.3  /  Alb  3.4  /  TBili  0.8  /  DBili  x   /  AST  35  /  ALT  38  /  AlkPhos  96  10-10    ABG - ( 10 Oct 2019 11:06 )  pH, Arterial: x     pH, Blood: 7.48  /  pCO2: 42    /  pO2: 100   / HCO3: 30    / Base Excess: 6.6   /  SaO2: 97                LIVER FUNCTIONS - ( 10 Oct 2019 07:45 )  Alb: 3.4 g/dL / Pro: 7.3 gm/dL / ALK PHOS: 96 U/L / ALT: 38 U/L / AST: 35 U/L / GGT: x           Creatinine Trend: 0.78<--, 0.85<--, 1.03<--, 0.78<--, 0.66<--      Assessment     Hypervolemic  hyponatremia, chronic;       Plan:  Continue loop diuretic; FR;   Will follow.    Lyle Heaton MD

## 2019-10-12 LAB
ANION GAP SERPL CALC-SCNC: 5 MMOL/L — SIGNIFICANT CHANGE UP (ref 5–17)
BUN SERPL-MCNC: 25 MG/DL — HIGH (ref 7–23)
CALCIUM SERPL-MCNC: 8.1 MG/DL — LOW (ref 8.5–10.1)
CHLORIDE SERPL-SCNC: 95 MMOL/L — LOW (ref 96–108)
CO2 SERPL-SCNC: 35 MMOL/L — HIGH (ref 22–31)
CREAT SERPL-MCNC: 0.73 MG/DL — SIGNIFICANT CHANGE UP (ref 0.5–1.3)
GLUCOSE SERPL-MCNC: 137 MG/DL — HIGH (ref 70–99)
HCT VFR BLD CALC: 33.4 % — LOW (ref 34.5–45)
HGB BLD-MCNC: 10.1 G/DL — LOW (ref 11.5–15.5)
MAGNESIUM SERPL-MCNC: 1.8 MG/DL — SIGNIFICANT CHANGE UP (ref 1.6–2.6)
MCHC RBC-ENTMCNC: 23.9 PG — LOW (ref 27–34)
MCHC RBC-ENTMCNC: 30.2 GM/DL — LOW (ref 32–36)
MCV RBC AUTO: 79.1 FL — LOW (ref 80–100)
NRBC # BLD: 0 /100 WBCS — SIGNIFICANT CHANGE UP (ref 0–0)
PHOSPHATE SERPL-MCNC: 2.4 MG/DL — LOW (ref 2.5–4.5)
PLATELET # BLD AUTO: 332 K/UL — SIGNIFICANT CHANGE UP (ref 150–400)
POTASSIUM SERPL-MCNC: 4 MMOL/L — SIGNIFICANT CHANGE UP (ref 3.5–5.3)
POTASSIUM SERPL-SCNC: 4 MMOL/L — SIGNIFICANT CHANGE UP (ref 3.5–5.3)
RBC # BLD: 4.22 M/UL — SIGNIFICANT CHANGE UP (ref 3.8–5.2)
RBC # FLD: 15.9 % — HIGH (ref 10.3–14.5)
SODIUM SERPL-SCNC: 135 MMOL/L — SIGNIFICANT CHANGE UP (ref 135–145)
WBC # BLD: 7.73 K/UL — SIGNIFICANT CHANGE UP (ref 3.8–10.5)
WBC # FLD AUTO: 7.73 K/UL — SIGNIFICANT CHANGE UP (ref 3.8–10.5)

## 2019-10-12 PROCEDURE — 99232 SBSQ HOSP IP/OBS MODERATE 35: CPT

## 2019-10-12 RX ORDER — SODIUM,POTASSIUM PHOSPHATES 278-250MG
2 POWDER IN PACKET (EA) ORAL ONCE
Refills: 0 | Status: COMPLETED | OUTPATIENT
Start: 2019-10-12 | End: 2019-10-12

## 2019-10-12 RX ADMIN — LISINOPRIL 20 MILLIGRAM(S): 2.5 TABLET ORAL at 05:10

## 2019-10-12 RX ADMIN — APIXABAN 2.5 MILLIGRAM(S): 2.5 TABLET, FILM COATED ORAL at 05:11

## 2019-10-12 RX ADMIN — Medication 30 MILLIGRAM(S): at 17:38

## 2019-10-12 RX ADMIN — Medication 81 MILLIGRAM(S): at 11:09

## 2019-10-12 RX ADMIN — Medication 60 MILLIGRAM(S): at 17:37

## 2019-10-12 RX ADMIN — Medication 100 MILLIGRAM(S): at 21:46

## 2019-10-12 RX ADMIN — ATORVASTATIN CALCIUM 20 MILLIGRAM(S): 80 TABLET, FILM COATED ORAL at 21:44

## 2019-10-12 RX ADMIN — Medication 5 MILLILITER(S): at 11:09

## 2019-10-12 RX ADMIN — Medication 100 MILLIGRAM(S): at 05:10

## 2019-10-12 RX ADMIN — Medication 650 MILLIGRAM(S): at 01:05

## 2019-10-12 RX ADMIN — Medication 50 MILLIGRAM(S): at 05:10

## 2019-10-12 RX ADMIN — Medication 60 MILLIGRAM(S): at 05:10

## 2019-10-12 RX ADMIN — Medication 30 MILLIGRAM(S): at 05:11

## 2019-10-12 RX ADMIN — Medication 100 MILLIGRAM(S): at 13:18

## 2019-10-12 RX ADMIN — BUDESONIDE AND FORMOTEROL FUMARATE DIHYDRATE 2 PUFF(S): 160; 4.5 AEROSOL RESPIRATORY (INHALATION) at 05:11

## 2019-10-12 RX ADMIN — Medication 30 MILLIGRAM(S): at 11:09

## 2019-10-12 RX ADMIN — APIXABAN 2.5 MILLIGRAM(S): 2.5 TABLET, FILM COATED ORAL at 17:38

## 2019-10-12 RX ADMIN — Medication 650 MILLIGRAM(S): at 00:05

## 2019-10-12 RX ADMIN — Medication 3 MILLILITER(S): at 04:38

## 2019-10-12 RX ADMIN — Medication 650 MILLIGRAM(S): at 21:46

## 2019-10-12 RX ADMIN — Medication 650 MILLIGRAM(S): at 22:08

## 2019-10-12 RX ADMIN — BUDESONIDE AND FORMOTEROL FUMARATE DIHYDRATE 2 PUFF(S): 160; 4.5 AEROSOL RESPIRATORY (INHALATION) at 17:38

## 2019-10-12 RX ADMIN — PANTOPRAZOLE SODIUM 40 MILLIGRAM(S): 20 TABLET, DELAYED RELEASE ORAL at 06:23

## 2019-10-12 RX ADMIN — Medication 40 MILLIGRAM(S): at 11:09

## 2019-10-12 RX ADMIN — Medication 2 PACKET(S): at 21:43

## 2019-10-12 RX ADMIN — ZOLPIDEM TARTRATE 5 MILLIGRAM(S): 10 TABLET ORAL at 21:47

## 2019-10-12 RX ADMIN — Medication 50 MILLIGRAM(S): at 17:38

## 2019-10-12 NOTE — PROGRESS NOTE ADULT - SUBJECTIVE AND OBJECTIVE BOX
Subjective: confused, agitated. Pos dyspnea, on VM at present.       MEDICATIONS  (STANDING):  apixaban 2.5 milliGRAM(s) Oral every 12 hours  aspirin enteric coated 81 milliGRAM(s) Oral daily  atorvastatin 20 milliGRAM(s) Oral at bedtime  Biotene Dry Mouth Oral Rinse 5 milliLiter(s) Swish and Spit daily  buDESOnide  80 MICROgram(s)/formoterol 4.5 MICROgram(s) Inhaler 2 Puff(s) Inhalation two times a day  diltiazem    Tablet 30 milliGRAM(s) Oral every 6 hours  docusate sodium 100 milliGRAM(s) Oral three times a day  FLUoxetine 40 milliGRAM(s) Oral daily  furosemide   Injectable 60 milliGRAM(s) IV Push every 12 hours  influenza   Vaccine 0.5 milliLiter(s) IntraMuscular once  lisinopril 20 milliGRAM(s) Oral daily  metoprolol tartrate 50 milliGRAM(s) Oral two times a day  pantoprazole    Tablet 40 milliGRAM(s) Oral before breakfast    MEDICATIONS  (PRN):  acetaminophen   Tablet .. 650 milliGRAM(s) Oral every 6 hours PRN Temp greater or equal to 38C (100.4F), Moderate Pain (4 - 6)  ALBUTerol    90 MICROgram(s) HFA Inhaler 1 Puff(s) Inhalation every 4 hours PRN Shortness of Breath and/or Wheezing  ALBUTerol/ipratropium for Nebulization 3 milliLiter(s) Nebulizer every 6 hours PRN Shortness of Breath and/or Wheezing  zolpidem 5 milliGRAM(s) Oral at bedtime PRN Insomnia          T(C): 36.2 (10-12-19 @ 04:30), Max: 36.3 (10-11-19 @ 10:43)  HR: 91 (10-12-19 @ 04:30) (90 - 104)  BP: 145/78 (10-12-19 @ 04:30) (131/67 - 157/89)  RR: 17 (10-12-19 @ 04:30) (17 - 18)  SpO2: 100% (10-12-19 @ 04:30) (97% - 100%)  Wt(kg): --    ABG - ( 10 Oct 2019 11:06 )  pH, Arterial: x     pH, Blood: 7.48  /  pCO2: 42    /  pO2: 100   / HCO3: 30    / Base Excess: 6.6   /  SaO2: 97                  I&O's Detail    11 Oct 2019 07:01  -  12 Oct 2019 07:00  --------------------------------------------------------  IN:    Oral Fluid: 600 mL  Total IN: 600 mL    OUT:    Voided: 1001 mL  Total OUT: 1001 mL    Total NET: -401 mL               PHYSICAL EXAM:    GENERAL: dyspneic  HEAD:  Atraumatic, Normocephalic  EYES: EOMI, PERRLA, conjunctiva and sclera clear  ENMT: No tonsillar erythema, exudates, or enlargement; Moist mucous membranes, Good dentition, No lesions  NECK: Supple, No JVD, Normal thyroid  CHEST/LUNG: basilar crackles  HEART: Regular rate and rhythm; No murmurs, rubs, or gallops  ABDOMEN: Soft, Nontender, Nondistended; Bowel sounds present  EXTREMITIES:  pos edema          LABS:  CBC Full  -  ( 11 Oct 2019 10:08 )  WBC Count : 8.77 K/uL  RBC Count : 4.57 M/uL  Hemoglobin : 10.8 g/dL  Hematocrit : 35.4 %  Platelet Count - Automated : 319 K/uL  Mean Cell Volume : 77.5 fl  Mean Cell Hemoglobin : 23.6 pg  Mean Cell Hemoglobin Concentration : 30.5 gm/dL  Auto Neutrophil # : x  Auto Lymphocyte # : x  Auto Monocyte # : x  Auto Eosinophil # : x  Auto Basophil # : x  Auto Neutrophil % : x  Auto Lymphocyte % : x  Auto Monocyte % : x  Auto Eosinophil % : x  Auto Basophil % : x    10-11    135  |  94<L>  |  28<H>  ----------------------------<  105<H>  3.3<L>   |  34<H>  |  0.78    Ca    8.7      11 Oct 2019 10:08        Impression:  * HypoNa -- hypervolemia, pl effusion. Improved  * G3DD, mod MR, refractory fluid overload  * B/L effusions  * COPD    Recommendations:   * Keep off of Zaroxolyn due to its salt wasting effect  * Cont lasix 60mg IV BID  * Repeat CXR  * FR 1000cc/day

## 2019-10-12 NOTE — PROGRESS NOTE ADULT - SUBJECTIVE AND OBJECTIVE BOX
86 yo woman with CHF slowly improving.      Vital Signs Last 24 Hrs  T(C): 36.7 (12 Oct 2019 10:23), Max: 36.7 (12 Oct 2019 10:23)  T(F): 98 (12 Oct 2019 10:23), Max: 98 (12 Oct 2019 10:23)  HR: 78 (12 Oct 2019 10:23) (78 - 104)  BP: 165/67 (12 Oct 2019 10:23) (131/67 - 165/67)  BP(mean): --  RR: 18 (12 Oct 2019 10:23) (17 - 18)  SpO2: 100% (12 Oct 2019 10:23) (97% - 100%)    MEDICATIONS  (STANDING):  apixaban 2.5 milliGRAM(s) Oral every 12 hours  aspirin enteric coated 81 milliGRAM(s) Oral daily  atorvastatin 20 milliGRAM(s) Oral at bedtime  Biotene Dry Mouth Oral Rinse 5 milliLiter(s) Swish and Spit daily  buDESOnide  80 MICROgram(s)/formoterol 4.5 MICROgram(s) Inhaler 2 Puff(s) Inhalation two times a day  diltiazem    Tablet 30 milliGRAM(s) Oral every 6 hours  docusate sodium 100 milliGRAM(s) Oral three times a day  FLUoxetine 40 milliGRAM(s) Oral daily  furosemide   Injectable 60 milliGRAM(s) IV Push every 12 hours  influenza   Vaccine 0.5 milliLiter(s) IntraMuscular once  lisinopril 20 milliGRAM(s) Oral daily  metoprolol tartrate 50 milliGRAM(s) Oral two times a day  pantoprazole    Tablet 40 milliGRAM(s) Oral before breakfast    MEDICATIONS  (PRN):  acetaminophen   Tablet .. 650 milliGRAM(s) Oral every 6 hours PRN Temp greater or equal to 38C (100.4F), Moderate Pain (4 - 6)  ALBUTerol    90 MICROgram(s) HFA Inhaler 1 Puff(s) Inhalation every 4 hours PRN Shortness of Breath and/or Wheezing  ALBUTerol/ipratropium for Nebulization 3 milliLiter(s) Nebulizer every 6 hours PRN Shortness of Breath and/or Wheezing  zolpidem 5 milliGRAM(s) Oral at bedtime PRN Insomnia

## 2019-10-12 NOTE — PROGRESS NOTE ADULT - SUBJECTIVE AND OBJECTIVE BOX
Patient is a 85y old  Female who presents with a chief complaint of dyspnea (10 Oct 2019 17:38)    PAST MEDICAL & SURGICAL HISTORY:  Coronary bypass graft mechanical complication  ST elevation myocardial infarction (STEMI), unspecified artery  BETSY (acute kidney injury)  Insomnia, unspecified type  Gastroesophageal reflux disease without esophagitis  Acute congestive heart failure, unspecified congestive heart failure type  Hypertension  S/P CABG x 1: 9 days ago  S/P TKR (total knee replacement) using cement, bilateral    INTERVAL HISTORY: Patient in bed sleeping, O2 NC in use, dyspnea better. Denies chest pain and palpitations.      Vitals:  Vital Signs Last 24 Hrs  T(C): 36.7 (12 Oct 2019 10:23), Max: 36.7 (12 Oct 2019 10:23)  T(F): 98 (12 Oct 2019 10:23), Max: 98 (12 Oct 2019 10:23)  HR: 78 (12 Oct 2019 10:23) (78 - 104)  BP: 165/67 (12 Oct 2019 10:23) (131/67 - 165/67)  RR: 18 (12 Oct 2019 10:23) (17 - 18)  SpO2: 100% (12 Oct 2019 10:23) (97% - 100%)    PHYSICAL EXAM:  Neuro: Awake, responsive  CV: S1 S2 irregular   Lungs: Diminished at bases  GI: Soft, BS +, ND, NT  Extremities: Satish LE edema    TELEMETRY: AFIB 80  	    RADIOLOGY: < from: Xray Chest 1 View- PORTABLE-Routine (10.10.19 @ 11:50) >  INTERPRETATION:  AP semierect chest on October 10, 2019 at 11:19 AM.   Patient is short of breath.    Heart is likely enlarged. Sternotomy noted.    There is likely a hiatal hernia.    Moderate to large right effusion has increased from October 4. There is   also an increasing mild left effusion.    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    IMPRESSION: Increasing effusions.    < end of copied text >      DIAGNOSTIC TESTING:    [ x] Echocardiogram: < from: TTE Echo Doppler w/o Cont (08.23.19 @ 16:30) >  Summary:   1. Left ventricular ejection fraction, by visual estimation, is 55 to   60%.   2. Normal left ventricular internal cavity size.   3. Spectral Doppler shows restrictive pattern of left ventricular  myocardial filling (Grade III diastolic dysfunction).   4. Normal right ventricular size and function.   5. The left atrium is normal in size.   6. The right atrium is normal in size.   7. There is no evidence of pericardial effusion.   8. Mild to moderate mitral annular calcification.   9. Mild to moderate mitral valve regurgitation.  10. Thickening of the anterior and posterior mitral valve leaflets.  11. Structurally normal tricuspid valve, with normal leaflet excursion.  12. Moderate aorticregurgitation.  13. Structurally normal pulmonic valve, with normal leaflet excursion.  14. Estimated pulmonary artery systolic pressure is 47.9 mmHg assuming a   right atrial pressure of 10 mmHg, which is consistent with mild pulmonary   hypertension.  15. Mitral valve mean gradient is 4.9 mmHg consistent with mild mitral   stenosis.    < end of copied text >    [ x]  Catheterization: < from: Cardiac Cath Lab - Adult (04.18.16 @ 18:01) >  CORONARY VESSELS: The coronary circulation is right dominant.  LM:   --  Distal left main: There was a 60 % stenosis.  LAD:   --  Ostial LAD: There was a 90 % stenosis.  --  Mid LAD: There was a 90 % stenosis.  CX:   --  Proximal circumflex: There was a 90 % stenosis.  RCA:   --  Mid RCA: There was a 100 % stenosis.  COMPLICATIONS: There were no complications.  DIAGNOSTIC RECOMMENDATIONS: Consultation with a cardiac surgeon will be  obtained for coronary artery bypass grafting.    < end of copied text >    LABS:	 	                            10.1   7.73  )-----------( 332      ( 12 Oct 2019 08:46 )             33.4     10-12    135  |  95<L>  |  25<H>  ----------------------------<  137<H>  4.0   |  35<H>  |  0.73    Ca    8.1<L>      12 Oct 2019 08:46  Phos  2.4     10-12  Mg     1.8     10-12

## 2019-10-13 PROCEDURE — 99232 SBSQ HOSP IP/OBS MODERATE 35: CPT

## 2019-10-13 RX ORDER — SPIRONOLACTONE 25 MG/1
25 TABLET, FILM COATED ORAL DAILY
Refills: 0 | Status: DISCONTINUED | OUTPATIENT
Start: 2019-10-13 | End: 2019-10-14

## 2019-10-13 RX ADMIN — Medication 40 MILLIGRAM(S): at 11:18

## 2019-10-13 RX ADMIN — Medication 60 MILLIGRAM(S): at 06:11

## 2019-10-13 RX ADMIN — Medication 30 MILLIGRAM(S): at 00:52

## 2019-10-13 RX ADMIN — PANTOPRAZOLE SODIUM 40 MILLIGRAM(S): 20 TABLET, DELAYED RELEASE ORAL at 06:08

## 2019-10-13 RX ADMIN — SPIRONOLACTONE 25 MILLIGRAM(S): 25 TABLET, FILM COATED ORAL at 14:28

## 2019-10-13 RX ADMIN — Medication 100 MILLIGRAM(S): at 22:37

## 2019-10-13 RX ADMIN — ATORVASTATIN CALCIUM 20 MILLIGRAM(S): 80 TABLET, FILM COATED ORAL at 22:36

## 2019-10-13 RX ADMIN — Medication 30 MILLIGRAM(S): at 11:18

## 2019-10-13 RX ADMIN — Medication 650 MILLIGRAM(S): at 22:36

## 2019-10-13 RX ADMIN — APIXABAN 2.5 MILLIGRAM(S): 2.5 TABLET, FILM COATED ORAL at 06:08

## 2019-10-13 RX ADMIN — Medication 30 MILLIGRAM(S): at 17:11

## 2019-10-13 RX ADMIN — Medication 100 MILLIGRAM(S): at 06:08

## 2019-10-13 RX ADMIN — APIXABAN 2.5 MILLIGRAM(S): 2.5 TABLET, FILM COATED ORAL at 17:11

## 2019-10-13 RX ADMIN — Medication 81 MILLIGRAM(S): at 11:18

## 2019-10-13 RX ADMIN — Medication 50 MILLIGRAM(S): at 17:11

## 2019-10-13 RX ADMIN — Medication 650 MILLIGRAM(S): at 23:06

## 2019-10-13 RX ADMIN — Medication 60 MILLIGRAM(S): at 17:11

## 2019-10-13 RX ADMIN — Medication 5 MILLILITER(S): at 11:18

## 2019-10-13 RX ADMIN — Medication 50 MILLIGRAM(S): at 06:08

## 2019-10-13 RX ADMIN — LISINOPRIL 20 MILLIGRAM(S): 2.5 TABLET ORAL at 06:08

## 2019-10-13 RX ADMIN — ZOLPIDEM TARTRATE 5 MILLIGRAM(S): 10 TABLET ORAL at 22:36

## 2019-10-13 RX ADMIN — BUDESONIDE AND FORMOTEROL FUMARATE DIHYDRATE 2 PUFF(S): 160; 4.5 AEROSOL RESPIRATORY (INHALATION) at 17:11

## 2019-10-13 RX ADMIN — Medication 30 MILLIGRAM(S): at 06:08

## 2019-10-13 RX ADMIN — BUDESONIDE AND FORMOTEROL FUMARATE DIHYDRATE 2 PUFF(S): 160; 4.5 AEROSOL RESPIRATORY (INHALATION) at 06:11

## 2019-10-13 RX ADMIN — Medication 100 MILLIGRAM(S): at 14:28

## 2019-10-13 NOTE — PROGRESS NOTE ADULT - VASCULAR
Equal and normal pulses (carotid, femoral, dorsalis pedis)

## 2019-10-13 NOTE — PROGRESS NOTE ADULT - PROBLEM SELECTOR PROBLEM 1
Acute on chronic diastolic (congestive) heart failure
Acute on chronic diastolic (congestive) heart failure
CHF (congestive heart failure)
Acute on chronic diastolic (congestive) heart failure
CHF (congestive heart failure)
Acute on chronic diastolic (congestive) heart failure

## 2019-10-13 NOTE — PROGRESS NOTE ADULT - SUBJECTIVE AND OBJECTIVE BOX
86 yo lady with systolic CHF    Vital Signs Last 24 Hrs  T(C): 35.8 (13 Oct 2019 11:03), Max: 36.4 (13 Oct 2019 04:30)  T(F): 96.5 (13 Oct 2019 11:03), Max: 97.5 (13 Oct 2019 04:30)  HR: 89 (13 Oct 2019 11:03) (84 - 102)  BP: 139/70 (13 Oct 2019 11:03) (139/70 - 158/86)  BP(mean): --  RR: 18 (13 Oct 2019 11:03) (17 - 18)  SpO2: 100% (13 Oct 2019 11:03) (96% - 100%)    MEDICATIONS  (STANDING):  apixaban 2.5 milliGRAM(s) Oral every 12 hours  aspirin enteric coated 81 milliGRAM(s) Oral daily  atorvastatin 20 milliGRAM(s) Oral at bedtime  Biotene Dry Mouth Oral Rinse 5 milliLiter(s) Swish and Spit daily  buDESOnide  80 MICROgram(s)/formoterol 4.5 MICROgram(s) Inhaler 2 Puff(s) Inhalation two times a day  diltiazem    Tablet 30 milliGRAM(s) Oral every 6 hours  docusate sodium 100 milliGRAM(s) Oral three times a day  FLUoxetine 40 milliGRAM(s) Oral daily  furosemide   Injectable 60 milliGRAM(s) IV Push every 12 hours  influenza   Vaccine 0.5 milliLiter(s) IntraMuscular once  lisinopril 20 milliGRAM(s) Oral daily  metoprolol tartrate 50 milliGRAM(s) Oral two times a day  pantoprazole    Tablet 40 milliGRAM(s) Oral before breakfast    MEDICATIONS  (PRN):  acetaminophen   Tablet .. 650 milliGRAM(s) Oral every 6 hours PRN Temp greater or equal to 38C (100.4F), Moderate Pain (4 - 6)  ALBUTerol    90 MICROgram(s) HFA Inhaler 1 Puff(s) Inhalation every 4 hours PRN Shortness of Breath and/or Wheezing  ALBUTerol/ipratropium for Nebulization 3 milliLiter(s) Nebulizer every 6 hours PRN Shortness of Breath and/or Wheezing  zolpidem 5 milliGRAM(s) Oral at bedtime PRN Insomnia

## 2019-10-13 NOTE — PROGRESS NOTE ADULT - PROBLEM SELECTOR PROBLEM 2
Atrial fibrillation
Atrial fibrillation
Chronic obstructive pulmonary disease, unspecified COPD type
Atrial fibrillation
Atrial fibrillation
Atrial fibrillation, unspecified type
Atrial fibrillation, unspecified type
Essential hypertension

## 2019-10-13 NOTE — PROGRESS NOTE ADULT - ASSESSMENT
86 YO F with a PMH of CAD s/p CABG, HFpEF, HTN, GERD, OA s/p bilateral TKR presents to the ED for five days of shortness of breath.  Patient reports increased dyspnea on exertion and at rest during that time.  Patient reports cough with some clear to white sputum, also reports that she frequently has orthopnea and has noticed her legs swelling more recently.  Patient denies sick contacts at home, stating she lives alone but her sons are currently visiting.  Denies chest pain, palpitations, nausea, vomiting, diaphoresis, dizziness, or lightheadedness.   Found to be in rapid Afib with HRs 130-140s, started on Cardizem gtt with HRs improved with 70-90s  Kellie neg x 1  Echo 8/2019: LVEF 60%, DD, mild-mod MR, mod AI, mild MS/pHTN  ECG on admit:  afib w RVR 137bpm; lateral ST depressions   BNP (32408 ->8900) -> 14,000 on this admission  Tele: afib 70-90s    ·	Acute on chronic diastolic HF, EF 55-60%, Gr III DD  ·	Atrial fibrillation with RVR  ·	B/L pleural effusions  ·	Pulmonary HTN- mild  ·	CAD Hx, s/p CABG  ·	HTN  ·	GERD  ·	Hypokalemia  ·	Hyponatremia  ·	VHD ( mild-mod MR, mod AR, mild MS)    Plan    MEDICATIONS  (STANDING):  apixaban 2.5 milliGRAM(s) Oral every 12 hours  aspirin enteric coated 81 milliGRAM(s) Oral daily  atorvastatin 20 milliGRAM(s) Oral at bedtime  Biotene Dry Mouth Oral Rinse 5 milliLiter(s) Swish and Spit daily  buDESOnide  80 MICROgram(s)/formoterol 4.5 MICROgram(s) Inhaler 2 Puff(s) Inhalation two times a day  diltiazem    Tablet 30 milliGRAM(s) Oral every 6 hours  docusate sodium 100 milliGRAM(s) Oral three times a day  FLUoxetine 40 milliGRAM(s) Oral daily  furosemide   Injectable 60 milliGRAM(s) IV Push every 12 hours  influenza   Vaccine 0.5 milliLiter(s) IntraMuscular once  lisinopril 20 milliGRAM(s) Oral daily  metoprolol tartrate 50 milliGRAM(s) Oral two times a day  pantoprazole    Tablet 40 milliGRAM(s) Oral before breakfast  spironolactone 25 milliGRAM(s) Oral daily    AC for stroke prevention in setting of longstanding AF  fluid/salt restriction. PT/oob.  Keep negative fluid balance.    Kevin Moulton MD, FACC

## 2019-10-13 NOTE — PROGRESS NOTE ADULT - SUBJECTIVE AND OBJECTIVE BOX
Patient is a 85y old  Female who presents with a chief complaint of dyspnea (10 Oct 2019 17:38)    PAST MEDICAL & SURGICAL HISTORY:  Coronary bypass graft mechanical complication  ST elevation myocardial infarction (STEMI), unspecified artery  BETSY (acute kidney injury)  Insomnia, unspecified type  Gastroesophageal reflux disease without esophagitis  Acute congestive heart failure, unspecified congestive heart failure type  Hypertension  S/P CABG x 1: 9 days ago  S/P TKR (total knee replacement) using cement, bilateral    INTERVAL HISTORY: Patient in bed sleeping, O2 NC in use, dyspnea better. Denies chest pain and palpitations.  feels better today.    Vitals:  Vital Signs Last 24 Hrs  T(C): 35.8 (13 Oct 2019 11:03), Max: 36.4 (13 Oct 2019 04:30)  T(F): 96.5 (13 Oct 2019 11:03), Max: 97.5 (13 Oct 2019 04:30)  HR: 89 (13 Oct 2019 11:03) (84 - 102)  BP: 139/70 (13 Oct 2019 11:03) (139/70 - 158/86)  RR: 18 (13 Oct 2019 11:03) (17 - 18)  SpO2: 100% (13 Oct 2019 11:03) (96% - 100%)    PHYSICAL EXAM:  Neuro: Awake, responsive  CV: S1 S2 irregular   Lungs: Diminished at bases  GI: Soft, BS +, ND, NT  Extremities: Satish LE edema    TELEMETRY: AFIB controlled vent. response.  	    RADIOLOGY: < from: Xray Chest 1 View- PORTABLE-Routine (10.10.19 @ 11:50) >  INTERPRETATION:  AP semierect chest on October 10, 2019 at 11:19 AM.   Patient is short of breath.    Heart is likely enlarged. Sternotomy noted.    There is likely a hiatal hernia.    Moderate to large right effusion has increased from October 4. There is   also an increasing mild left effusion.    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    IMPRESSION: Increasing effusions.    < end of copied text >      DIAGNOSTIC TESTING:    [ x] Echocardiogram: < from: TTE Echo Doppler w/o Cont (08.23.19 @ 16:30) >  Summary:   1. Left ventricular ejection fraction, by visual estimation, is 55 to   60%.   2. Normal left ventricular internal cavity size.   3. Spectral Doppler shows restrictive pattern of left ventricular  myocardial filling (Grade III diastolic dysfunction).   4. Normal right ventricular size and function.   5. The left atrium is normal in size.   6. The right atrium is normal in size.   7. There is no evidence of pericardial effusion.   8. Mild to moderate mitral annular calcification.   9. Mild to moderate mitral valve regurgitation.  10. Thickening of the anterior and posterior mitral valve leaflets.  11. Structurally normal tricuspid valve, with normal leaflet excursion.  12. Moderate aorticregurgitation.  13. Structurally normal pulmonic valve, with normal leaflet excursion.  14. Estimated pulmonary artery systolic pressure is 47.9 mmHg assuming a   right atrial pressure of 10 mmHg, which is consistent with mild pulmonary   hypertension.  15. Mitral valve mean gradient is 4.9 mmHg consistent with mild mitral   stenosis.    < end of copied text >    [ x]  Catheterization: < from: Cardiac Cath Lab - Adult (04.18.16 @ 18:01) >  CORONARY VESSELS: The coronary circulation is right dominant.  LM:   --  Distal left main: There was a 60 % stenosis.  LAD:   --  Ostial LAD: There was a 90 % stenosis.  --  Mid LAD: There was a 90 % stenosis.  CX:   --  Proximal circumflex: There was a 90 % stenosis.  RCA:   --  Mid RCA: There was a 100 % stenosis.  COMPLICATIONS: There were no complications.  DIAGNOSTIC RECOMMENDATIONS: Consultation with a cardiac surgeon will be  obtained for coronary artery bypass grafting.    < end of copied text >    LABS:	 	                          10.1   7.73  )-----------( 332      ( 12 Oct 2019 08:46 )             33.4     10-12    135  |  95<L>  |  25<H>  ----------------------------<  137<H>  4.0   |  35<H>  |  0.73    Ca    8.1<L>      12 Oct 2019 08:46  Phos  2.4     10-12  Mg     1.8     10-12

## 2019-10-13 NOTE — PROGRESS NOTE ADULT - PROBLEM SELECTOR PROBLEM 3
Atrial fibrillation
Chronic obstructive pulmonary disease, unspecified COPD type
Essential hypertension
Chronic obstructive pulmonary disease, unspecified COPD type

## 2019-10-13 NOTE — PROGRESS NOTE ADULT - CARDIOVASCULAR
detailed exam
Regular rate & rhythm, normal S1, S2; no murmurs, gallops or rubs; no S3, S4

## 2019-10-14 ENCOUNTER — TRANSCRIPTION ENCOUNTER (OUTPATIENT)
Age: 84
End: 2019-10-14

## 2019-10-14 VITALS — HEART RATE: 94 BPM | OXYGEN SATURATION: 94 %

## 2019-10-14 LAB
ANION GAP SERPL CALC-SCNC: 5 MMOL/L — SIGNIFICANT CHANGE UP (ref 5–17)
BASE EXCESS BLDV CALC-SCNC: 10.6 MMOL/L — HIGH (ref -2–2)
BLOOD GAS COMMENTS, VENOUS: SIGNIFICANT CHANGE UP
BUN SERPL-MCNC: 24 MG/DL — HIGH (ref 7–23)
CALCIUM SERPL-MCNC: 8.9 MG/DL — SIGNIFICANT CHANGE UP (ref 8.5–10.1)
CHLORIDE SERPL-SCNC: 96 MMOL/L — SIGNIFICANT CHANGE UP (ref 96–108)
CO2 SERPL-SCNC: 36 MMOL/L — HIGH (ref 22–31)
CREAT SERPL-MCNC: 0.67 MG/DL — SIGNIFICANT CHANGE UP (ref 0.5–1.3)
GAS PNL BLDV: SIGNIFICANT CHANGE UP
GLUCOSE SERPL-MCNC: 156 MG/DL — HIGH (ref 70–99)
HCO3 BLDV-SCNC: 36 MMOL/L — HIGH (ref 21–29)
HOROWITZ INDEX BLDV+IHG-RTO: 36 — SIGNIFICANT CHANGE UP
MAGNESIUM SERPL-MCNC: 1.5 MG/DL — LOW (ref 1.6–2.6)
PCO2 BLDV: 56 MMHG — HIGH (ref 35–50)
PH BLDV: 7.43 — SIGNIFICANT CHANGE UP (ref 7.35–7.45)
PO2 BLDV: 62 MMHG — HIGH (ref 25–45)
POTASSIUM SERPL-MCNC: 3.3 MMOL/L — LOW (ref 3.5–5.3)
POTASSIUM SERPL-SCNC: 3.3 MMOL/L — LOW (ref 3.5–5.3)
SAO2 % BLDV: 90 % — HIGH (ref 67–88)
SODIUM SERPL-SCNC: 137 MMOL/L — SIGNIFICANT CHANGE UP (ref 135–145)

## 2019-10-14 PROCEDURE — 99232 SBSQ HOSP IP/OBS MODERATE 35: CPT

## 2019-10-14 RX ORDER — DILTIAZEM HCL 120 MG
120 CAPSULE, EXT RELEASE 24 HR ORAL DAILY
Refills: 0 | Status: DISCONTINUED | OUTPATIENT
Start: 2019-10-14 | End: 2019-10-14

## 2019-10-14 RX ORDER — MAGNESIUM SULFATE 500 MG/ML
2 VIAL (ML) INJECTION ONCE
Refills: 0 | Status: COMPLETED | OUTPATIENT
Start: 2019-10-14 | End: 2019-10-14

## 2019-10-14 RX ORDER — BUMETANIDE 0.25 MG/ML
2 INJECTION INTRAMUSCULAR; INTRAVENOUS EVERY 12 HOURS
Refills: 0 | Status: DISCONTINUED | OUTPATIENT
Start: 2019-10-14 | End: 2019-10-14

## 2019-10-14 RX ORDER — SODIUM,POTASSIUM PHOSPHATES 278-250MG
1 POWDER IN PACKET (EA) ORAL
Refills: 0 | Status: DISCONTINUED | OUTPATIENT
Start: 2019-10-14 | End: 2019-10-14

## 2019-10-14 RX ORDER — POTASSIUM CHLORIDE 20 MEQ
40 PACKET (EA) ORAL ONCE
Refills: 0 | Status: COMPLETED | OUTPATIENT
Start: 2019-10-14 | End: 2019-10-14

## 2019-10-14 RX ORDER — DILTIAZEM HCL 120 MG
1 CAPSULE, EXT RELEASE 24 HR ORAL
Qty: 0 | Refills: 0 | DISCHARGE
Start: 2019-10-14

## 2019-10-14 RX ORDER — SPIRONOLACTONE 25 MG/1
1 TABLET, FILM COATED ORAL
Qty: 0 | Refills: 0 | DISCHARGE
Start: 2019-10-14

## 2019-10-14 RX ORDER — APIXABAN 2.5 MG/1
1 TABLET, FILM COATED ORAL
Qty: 0 | Refills: 0 | DISCHARGE
Start: 2019-10-14

## 2019-10-14 RX ADMIN — Medication 30 MILLIGRAM(S): at 01:50

## 2019-10-14 RX ADMIN — APIXABAN 2.5 MILLIGRAM(S): 2.5 TABLET, FILM COATED ORAL at 05:59

## 2019-10-14 RX ADMIN — Medication 1 PACKET(S): at 11:40

## 2019-10-14 RX ADMIN — SPIRONOLACTONE 25 MILLIGRAM(S): 25 TABLET, FILM COATED ORAL at 06:35

## 2019-10-14 RX ADMIN — Medication 100 MILLIGRAM(S): at 13:22

## 2019-10-14 RX ADMIN — Medication 30 MILLIGRAM(S): at 11:21

## 2019-10-14 RX ADMIN — Medication 40 MILLIEQUIVALENT(S): at 13:22

## 2019-10-14 RX ADMIN — Medication 30 MILLIGRAM(S): at 05:59

## 2019-10-14 RX ADMIN — LISINOPRIL 20 MILLIGRAM(S): 2.5 TABLET ORAL at 05:59

## 2019-10-14 RX ADMIN — Medication 60 MILLIGRAM(S): at 06:35

## 2019-10-14 RX ADMIN — BUDESONIDE AND FORMOTEROL FUMARATE DIHYDRATE 2 PUFF(S): 160; 4.5 AEROSOL RESPIRATORY (INHALATION) at 06:00

## 2019-10-14 RX ADMIN — PANTOPRAZOLE SODIUM 40 MILLIGRAM(S): 20 TABLET, DELAYED RELEASE ORAL at 06:36

## 2019-10-14 RX ADMIN — Medication 81 MILLIGRAM(S): at 11:22

## 2019-10-14 RX ADMIN — Medication 5 MILLILITER(S): at 11:22

## 2019-10-14 RX ADMIN — Medication 100 MILLIGRAM(S): at 05:59

## 2019-10-14 RX ADMIN — Medication 50 GRAM(S): at 13:22

## 2019-10-14 RX ADMIN — Medication 40 MILLIGRAM(S): at 11:22

## 2019-10-14 RX ADMIN — Medication 50 MILLIGRAM(S): at 05:59

## 2019-10-14 NOTE — DISCHARGE NOTE PROVIDER - CARE PROVIDERS DIRECT ADDRESSES
,DirectAddress_Unknown,[email or web address]

## 2019-10-14 NOTE — DISCHARGE NOTE PROVIDER - NSDCCPCAREPLAN_GEN_ALL_CORE_FT
PRINCIPAL DISCHARGE DIAGNOSIS  Diagnosis: CHF (congestive heart failure)  Assessment and Plan of Treatment:       SECONDARY DISCHARGE DIAGNOSES  Diagnosis: Atrial fibrillation  Assessment and Plan of Treatment: Atrial fibrillation    Diagnosis: Chronic obstructive pulmonary disease, unspecified COPD type  Assessment and Plan of Treatment: Chronic obstructive pulmonary disease, unspecified COPD type    Diagnosis: Depression, unspecified depression type  Assessment and Plan of Treatment: Depression, unspecified depression type    Diagnosis: Essential hypertension  Assessment and Plan of Treatment: Essential hypertension    Diagnosis: CHF (congestive heart failure)  Assessment and Plan of Treatment:

## 2019-10-14 NOTE — PROGRESS NOTE ADULT - ATTENDING COMMENTS
Change to long acting CACB. Rates adequate, CHf appears resolving, change in AM to PO meds?  F/U in our office in 1-2 weeks post discharge for further titration of meds.

## 2019-10-14 NOTE — PROGRESS NOTE ADULT - ASSESSMENT
86 YO F with a PMH of CAD s/p CABG, HFpEF, HTN, GERD, OA s/p bilateral TKR presents to the ED for five days of shortness of breath.  Patient reports increased dyspnea on exertion and at rest during that time.  Patient reports cough with some clear to white sputum, also reports that she frequently has orthopnea and has noticed her legs swelling more recently.  Patient denies sick contacts at home, stating she lives alone but her sons are currently visiting.  Denies chest pain, palpitations, nausea, vomiting, diaphoresis, dizziness, or lightheadedness.   Found to be in rapid Afib with HRs 130-140s, started on Cardizem gtt with HRs improved with 70-90s  Kellie neg x 1  Echo 8/2019: LVEF 60%, DD, mild-mod MR, mod AI, mild MS/pHTN  ECG on admit:  afib w RVR 137bpm; lateral ST depressions   BNP (76676 ->8900) -> 14,000 on this admission  Tele: afib 70-90s    ·	Acute on chronic diastolic HF, EF 55-60%, Gr III DD  ·	Atrial fibrillation with RVR  ·	B/L pleural effusions  ·	Pulmonary HTN- mild  ·	CAD Hx, s/p CABG  ·	HTN  ·	GERD  ·	Hyponatremia  ·	VHD ( mild-mod MR, mod AR, mild MS)    Plan  -Cont with tele  - cont IV Lasix 60mg BID, significant LE edema persist, diuresis until euvolemic   -Monitor renal function/electrolytes/daily weights  -Cont Eliquis for Afib, monitor h/h  -CHF teaching, seen by dietician, fluid and salt restriction   -Cont with Cardizem 30 mg Q6H for rate control along with Metoprolol  -cont with supplemental O2/Duoneb  -cont asa/statin/lisinopril/metoprolol  -Renal following for Hyponatremia 128--> 135, FR 1000ml/24 hrs  -cont with PPI  -Activity as tolerated 86 YO F with a PMH of CAD s/p CABG, HFpEF, HTN, GERD, OA s/p bilateral TKR presents to the ED for five days of shortness of breath.  Patient reports increased dyspnea on exertion and at rest during that time.  Patient reports cough with some clear to white sputum, also reports that she frequently has orthopnea and has noticed her legs swelling more recently.  Patient denies sick contacts at home, stating she lives alone but her sons are currently visiting.  Denies chest pain, palpitations, nausea, vomiting, diaphoresis, dizziness, or lightheadedness.   Found to be in rapid Afib with HRs 130-140s, started on Cardizem gtt with HRs improved with 70-90s  Kellie neg x 1  Echo 8/2019: LVEF 60%, DD, mild-mod MR, mod AI, mild MS/pHTN  ECG on admit:  afib w RVR 137bpm; lateral ST depressions   BNP (80557 ->8900) -> 14,000 on this admission  Tele: afib 70-90s    ·	Acute on chronic diastolic HF, EF 55-60%, Gr III DD  ·	Atrial fibrillation with RVR  ·	B/L pleural effusions  ·	Pulmonary HTN- mild  ·	CAD Hx, s/p CABG  ·	HTN  ·	GERD  ·	Hyponatremia  ·	VHD ( mild-mod MR, mod AR, mild MS)    Plan  -Cont with tele  - cont IV Lasix 60mg BID, significant LE edema persist, diuresis until euvolemic   -Monitor renal function/electrolytes/daily weights  -Cont Eliquis for Afib, monitor h/h  -CHF teaching, seen by dietician, fluid and salt restriction   -Cont with Cardizem 30 mg Q6H for rate control along with Metoprolol  -cont with supplemental O2/Duoneb  -cont asa/statin/lisinopril/metoprolol  -Renal following for Hyponatremia 128--> 135, FR 1000ml/24 hrs  -cont with PPI  -Activity as tolerated 86 YO F with a PMH of CAD s/p CABG, HFpEF, HTN, GERD, OA s/p bilateral TKR presents to the ED for five days of shortness of breath.  Patient reports increased dyspnea on exertion and at rest during that time.  Patient reports cough with some clear to white sputum, also reports that she frequently has orthopnea and has noticed her legs swelling more recently.  Patient denies sick contacts at home, stating she lives alone but her sons are currently visiting.  Denies chest pain, palpitations, nausea, vomiting, diaphoresis, dizziness, or lightheadedness.   Found to be in rapid Afib with HRs 130-140s, started on Cardizem gtt with HRs improved with 70-90s  Kellie neg x 1  Echo 8/2019: LVEF 60%, DD, mild-mod MR, mod AI, mild MS/pHTN  ECG on admit:  afib w RVR 137bpm; lateral ST depressions   BNP (45906 ->8900) -> 14,000 on this admission  Tele: afib 70-90s    ·	Acute on chronic diastolic HF, EF 55-60%, Gr III DD  ·	Atrial fibrillation with RVR  ·	B/L pleural effusions  ·	Pulmonary HTN- mild  ·	CAD Hx, s/p CABG  ·	HTN  ·	GERD  ·	Hyponatremia, resolved now  ·	Hypokalemia  ·	Hypomagnesemia  ·	Hypophosphatemia   ·	VHD ( mild-mod MR, mod AR, mild MS)    Plan  -Cont with tele  -cont IV Lasix 60mg BID,  LE edema improving,  also on aldactone 25 daily   -Monitor renal function/electrolytes/daily weights  -Cont Eliquis for Afib, monitor h/h  -CHF teaching, seen by dietician, fluid and salt restriction   -Cont with Cardizem 30 mg Q6H for rate control along with Metoprolol  -cont with supplemental O2/Duoneb  -cont asa/statin/lisinopril/metoprolol  -Renal following for Hyponatremia 128--> 135, FR 1000ml/24 hrs  -cont with PPI  -Supplement potassium/mg/phos  -Activity as tolerated

## 2019-10-14 NOTE — CHART NOTE - NSCHARTNOTEFT_GEN_A_CORE
Assessment: Pt w/ improving CHF ; acute on chronic CHF ; A fib ; COPD    Factors impacting intake: [x ] none [ ] nausea  [ ] vomiting [ ] diarrhea [ ] constipation  [ ]chewing problems [ ] swallowing issues  [ ] other:     10/8 - Diet Prescription: Diet, DASH/TLC:   Sodium & Cholesterol Restricted  1000mL Fluid Restriction ([id number]) (10-08-19 @ 12:27)    Intake: 75 - 100 % meals. Pt without c/o at this time.     Current Weight: 10/14 - 129.1 (58.6 kg) Edema - 2+ ( R & L) ankle, foot ; 10/7 - 135.1 (61.3 kg) Edema - 1+/2+ ( R & L) leg  % Weight Change - 4.4 % / 2.7 kg loss x  7 days ( w/ noted lasix q 12 hours).     Physical Assessment - WDWN    Pertinent Medications: MEDICATIONS  (STANDING):  apixaban 2.5 milliGRAM(s) Oral every 12 hours  aspirin enteric coated 81 milliGRAM(s) Oral daily  atorvastatin 20 milliGRAM(s) Oral at bedtime  Biotene Dry Mouth Oral Rinse 5 milliLiter(s) Swish and Spit daily  buDESOnide  80 MICROgram(s)/formoterol 4.5 MICROgram(s) Inhaler 2 Puff(s) Inhalation two times a day  diltiazem    Tablet 30 milliGRAM(s) Oral every 6 hours  docusate sodium 100 milliGRAM(s) Oral three times a day  FLUoxetine 40 milliGRAM(s) Oral daily  furosemide   Injectable 60 milliGRAM(s) IV Push every 12 hours  influenza   Vaccine 0.5 milliLiter(s) IntraMuscular once  lisinopril 20 milliGRAM(s) Oral daily  metoprolol tartrate 50 milliGRAM(s) Oral two times a day  pantoprazole    Tablet 40 milliGRAM(s) Oral before breakfast  potassium phosphate / sodium phosphate powder 1 Packet(s) Oral three times a day before meals  spironolactone 25 milliGRAM(s) Oral daily    MEDICATIONS  (PRN):  acetaminophen   Tablet .. 650 milliGRAM(s) Oral every 6 hours PRN Temp greater or equal to 38C (100.4F), Moderate Pain (4 - 6)  ALBUTerol    90 MICROgram(s) HFA Inhaler 1 Puff(s) Inhalation every 4 hours PRN Shortness of Breath and/or Wheezing  ALBUTerol/ipratropium for Nebulization 3 milliLiter(s) Nebulizer every 6 hours PRN Shortness of Breath and/or Wheezing  zolpidem 5 milliGRAM(s) Oral at bedtime PRN Insomnia    Pertinent Labs: 10-12 Na135 mmol/L Glu 137 mg/dL<H> K+ 4.0 mmol/L Cr  0.73 mg/dL BUN 25 mg/dL<H> 10-12 Phos 2.4 mg/dL<L> 10-10 Alb 3.4 g/dL     CAPILLARY BLOOD GLUCOSE        Skin: WDL    Estimated Needs:   [x ] no change since previous assessment (10/7/19  )  [ ] recalculated:     Nutrition Diagnosis:    Nutrition Diagnosis:  Nutrition diagnosis yes... Nutrition Diagnostic #1.     Nutrition Diagnostic Terminology #1 Food & Nutrition Related Knowledge Deficit.     Etiology unwilling/disinterested.     Signs/Symptoms Reports following a regular diet without salt restrictions.     Goal/Expected Outcome Pt will be able to verbalize 3 CHF self management symptom skills.          Nutrition Diagnosis is [x ] ongoing  [ ] resolved [ ] not applicable     New Nutrition Diagnosis: [x ] not applicable       Interventions:   Recommend  [ x ] Continue current diet  [ ] Change Diet To:  [ ] Nutrition Supplement  [ ] Nutrition Support  [x ] Other: Reviewed CHF nutrition therapy    Monitoring and Evaluation:   [x ] PO intake [ x ] Tolerance to diet prescription [ x ] weights [ x ] labs[ x ] follow up per protocol  [ ] other:

## 2019-10-14 NOTE — PROGRESS NOTE ADULT - SUBJECTIVE AND OBJECTIVE BOX
Patient is a 85y old  Female who presents with a chief complaint of dyspnea (13 Oct 2019 15:17)    PAST MEDICAL & SURGICAL HISTORY:  Coronary bypass graft mechanical complication  ST elevation myocardial infarction (STEMI), unspecified artery  BETSY (acute kidney injury)  Insomnia, unspecified type  Gastroesophageal reflux disease without esophagitis  Acute congestive heart failure, unspecified congestive heart failure type  Hypertension  S/P CABG x 1: 9 days ago  S/P TKR (total knee replacement) using cement, bilateral    INTERVAL HISTORY: resting in bed, not in any acute distress   	  MEDICATIONS:  MEDICATIONS  (STANDING):  apixaban 2.5 milliGRAM(s) Oral every 12 hours  aspirin enteric coated 81 milliGRAM(s) Oral daily  atorvastatin 20 milliGRAM(s) Oral at bedtime  Biotene Dry Mouth Oral Rinse 5 milliLiter(s) Swish and Spit daily  buDESOnide  80 MICROgram(s)/formoterol 4.5 MICROgram(s) Inhaler 2 Puff(s) Inhalation two times a day  diltiazem    Tablet 30 milliGRAM(s) Oral every 6 hours  docusate sodium 100 milliGRAM(s) Oral three times a day  FLUoxetine 40 milliGRAM(s) Oral daily  furosemide   Injectable 60 milliGRAM(s) IV Push every 12 hours  influenza   Vaccine 0.5 milliLiter(s) IntraMuscular once  lisinopril 20 milliGRAM(s) Oral daily  metoprolol tartrate 50 milliGRAM(s) Oral two times a day  pantoprazole    Tablet 40 milliGRAM(s) Oral before breakfast  potassium phosphate / sodium phosphate powder 1 Packet(s) Oral three times a day before meals  spironolactone 25 milliGRAM(s) Oral daily    MEDICATIONS  (PRN):  acetaminophen   Tablet .. 650 milliGRAM(s) Oral every 6 hours PRN Temp greater or equal to 38C (100.4F), Moderate Pain (4 - 6)  ALBUTerol    90 MICROgram(s) HFA Inhaler 1 Puff(s) Inhalation every 4 hours PRN Shortness of Breath and/or Wheezing  ALBUTerol/ipratropium for Nebulization 3 milliLiter(s) Nebulizer every 6 hours PRN Shortness of Breath and/or Wheezing  zolpidem 5 milliGRAM(s) Oral at bedtime PRN Insomnia    Vitals:  T(F): 97 (10-14-19 @ 05:45), Max: 97.1 (10-13-19 @ 23:32)  HR: 105 (10-14-19 @ 05:45) (89 - 106)  BP: 163/89 (10-14-19 @ 05:45) (139/70 - 171/72)  RR: 20 (10-14-19 @ 05:45) (18 - 20)  SpO2: 100% (10-14-19 @ 05:45) (96% - 100%)  Wt(kg): --58.6 kg    10-13 @ 07:01  -  10-14 @ 07:00  --------------------------------------------------------  IN:    Oral Fluid: 440 mL  Total IN: 440 mL    OUT:  Total OUT: 0 mL    Total NET: 440 mL    PHYSICAL EXAM:  Neuro: Awake, responsive  CV: S1 S2 irregular, + SM  Lungs: CTABL  GI: Soft, BS +, ND, NT  Extremities: + LE edema    TELEMETRY: atrial fibrillation    RADIOLOGY:   < from: Xray Chest 1 View- PORTABLE-Routine (10.10.19 @ 11:50) >  Moderate to large right effusion has increased from October 4. There is   also an increasing mild left effusion.    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    IMPRESSION: Increasing effusions.    < end of copied text >    DIAGNOSTIC TESTING:    [x ] Echocardiogram: < from: TTE Echo Doppler w/o Cont (08.23.19 @ 16:30) >   1. Left ventricular ejection fraction, by visual estimation, is 55 to   60%.   2. Normal left ventricular internal cavity size.   3. Spectral Doppler shows restrictive pattern of left ventricular  myocardial filling (Grade III diastolic dysfunction).   4. Normal right ventricular size and function.   5. The left atrium is normal in size.   6. The right atrium is normal in size.   7. There is no evidence of pericardial effusion.   8. Mild to moderate mitral annular calcification.   9. Mild to moderate mitral valve regurgitation.  10. Thickening of the anterior and posterior mitral valve leaflets.  11. Structurally normal tricuspid valve, with normal leaflet excursion.  12. Moderate aorticregurgitation.  13. Structurally normal pulmonic valve, with normal leaflet excursion.  14. Estimated pulmonary artery systolic pressure is 47.9 mmHg assuming a   right atrial pressure of 10 mmHg, which is consistent with mild pulmonary   hypertension.  15. Mitral valve mean gradient is 4.9 mmHg consistent with mild mitral   stenosis.    < end of copied text >    LABS:	 	    12 Oct 2019 08:46    135    |  95     |  25     ----------------------------<  137    4.0     |  35     |  0.73                        10.1   7.73  )-----------( 332      ( 12 Oct 2019 08:46 )             33.4 Patient is a 85y old  Female who presents with a chief complaint of dyspnea (13 Oct 2019 15:17)    PAST MEDICAL & SURGICAL HISTORY:  Coronary bypass graft mechanical complication  ST elevation myocardial infarction (STEMI), unspecified artery  BETSY (acute kidney injury)  Insomnia, unspecified type  Gastroesophageal reflux disease without esophagitis  Acute congestive heart failure, unspecified congestive heart failure type  Hypertension  S/P CABG x 1: 9 days ago  S/P TKR (total knee replacement) using cement, bilateral    INTERVAL HISTORY: resting in bed, not in any acute distress, still with mild DILLARD   	  MEDICATIONS:  MEDICATIONS  (STANDING):  apixaban 2.5 milliGRAM(s) Oral every 12 hours  aspirin enteric coated 81 milliGRAM(s) Oral daily  atorvastatin 20 milliGRAM(s) Oral at bedtime  Biotene Dry Mouth Oral Rinse 5 milliLiter(s) Swish and Spit daily  buDESOnide  80 MICROgram(s)/formoterol 4.5 MICROgram(s) Inhaler 2 Puff(s) Inhalation two times a day  diltiazem    Tablet 30 milliGRAM(s) Oral every 6 hours  docusate sodium 100 milliGRAM(s) Oral three times a day  FLUoxetine 40 milliGRAM(s) Oral daily  furosemide   Injectable 60 milliGRAM(s) IV Push every 12 hours  influenza   Vaccine 0.5 milliLiter(s) IntraMuscular once  lisinopril 20 milliGRAM(s) Oral daily  metoprolol tartrate 50 milliGRAM(s) Oral two times a day  pantoprazole    Tablet 40 milliGRAM(s) Oral before breakfast  potassium phosphate / sodium phosphate powder 1 Packet(s) Oral three times a day before meals  spironolactone 25 milliGRAM(s) Oral daily    MEDICATIONS  (PRN):  acetaminophen   Tablet .. 650 milliGRAM(s) Oral every 6 hours PRN Temp greater or equal to 38C (100.4F), Moderate Pain (4 - 6)  ALBUTerol    90 MICROgram(s) HFA Inhaler 1 Puff(s) Inhalation every 4 hours PRN Shortness of Breath and/or Wheezing  ALBUTerol/ipratropium for Nebulization 3 milliLiter(s) Nebulizer every 6 hours PRN Shortness of Breath and/or Wheezing  zolpidem 5 milliGRAM(s) Oral at bedtime PRN Insomnia    Vitals:  T(F): 97 (10-14-19 @ 05:45), Max: 97.1 (10-13-19 @ 23:32)  HR: 105 (10-14-19 @ 05:45) (89 - 106)  BP: 163/89 (10-14-19 @ 05:45) (139/70 - 171/72)  RR: 20 (10-14-19 @ 05:45) (18 - 20)  SpO2: 100% (10-14-19 @ 05:45) (96% - 100%)  Wt(kg): --58.6 kg    10-13 @ 07:01  -  10-14 @ 07:00  --------------------------------------------------------  IN:    Oral Fluid: 440 mL  Total IN: 440 mL    OUT:  Total OUT: 0 mL    Total NET: 440 mL    PHYSICAL EXAM:  Neuro: Awake, responsive  CV: S1 S2 irregular, + SM  Lungs: CTABL  GI: Soft, BS +, ND, NT  Extremities: + LE edema    TELEMETRY: atrial fibrillation    RADIOLOGY:   < from: Xray Chest 1 View- PORTABLE-Routine (10.10.19 @ 11:50) >  Moderate to large right effusion has increased from October 4. There is   also an increasing mild left effusion.    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    IMPRESSION: Increasing effusions.    < end of copied text >    DIAGNOSTIC TESTING:    [x ] Echocardiogram: < from: TTE Echo Doppler w/o Cont (08.23.19 @ 16:30) >   1. Left ventricular ejection fraction, by visual estimation, is 55 to   60%.   2. Normal left ventricular internal cavity size.   3. Spectral Doppler shows restrictive pattern of left ventricular  myocardial filling (Grade III diastolic dysfunction).   4. Normal right ventricular size and function.   5. The left atrium is normal in size.   6. The right atrium is normal in size.   7. There is no evidence of pericardial effusion.   8. Mild to moderate mitral annular calcification.   9. Mild to moderate mitral valve regurgitation.  10. Thickening of the anterior and posterior mitral valve leaflets.  11. Structurally normal tricuspid valve, with normal leaflet excursion.  12. Moderate aorticregurgitation.  13. Structurally normal pulmonic valve, with normal leaflet excursion.  14. Estimated pulmonary artery systolic pressure is 47.9 mmHg assuming a   right atrial pressure of 10 mmHg, which is consistent with mild pulmonary   hypertension.  15. Mitral valve mean gradient is 4.9 mmHg consistent with mild mitral   stenosis.    < end of copied text >    LABS:	 	    12 Oct 2019 08:46    135    |  95     |  25     ----------------------------<  137    4.0     |  35     |  0.73                        10.1   7.73  )-----------( 332      ( 12 Oct 2019 08:46 )             33.4 Patient is a 85y old  Female who presents with a chief complaint of dyspnea (13 Oct 2019 15:17)    PAST MEDICAL & SURGICAL HISTORY:  Coronary bypass graft mechanical complication  ST elevation myocardial infarction (STEMI), unspecified artery  BETSY (acute kidney injury)  Insomnia, unspecified type  Gastroesophageal reflux disease without esophagitis  Acute congestive heart failure, unspecified congestive heart failure type  Hypertension  S/P CABG x 1: 9 days ago  S/P TKR (total knee replacement) using cement, bilateral    INTERVAL HISTORY: resting in bed, not in any acute distress, still with mild DILLARD   	  MEDICATIONS:  MEDICATIONS  (STANDING):  apixaban 2.5 milliGRAM(s) Oral every 12 hours  aspirin enteric coated 81 milliGRAM(s) Oral daily  atorvastatin 20 milliGRAM(s) Oral at bedtime  Biotene Dry Mouth Oral Rinse 5 milliLiter(s) Swish and Spit daily  buDESOnide  80 MICROgram(s)/formoterol 4.5 MICROgram(s) Inhaler 2 Puff(s) Inhalation two times a day  diltiazem    Tablet 30 milliGRAM(s) Oral every 6 hours  docusate sodium 100 milliGRAM(s) Oral three times a day  FLUoxetine 40 milliGRAM(s) Oral daily  furosemide   Injectable 60 milliGRAM(s) IV Push every 12 hours  influenza   Vaccine 0.5 milliLiter(s) IntraMuscular once  lisinopril 20 milliGRAM(s) Oral daily  metoprolol tartrate 50 milliGRAM(s) Oral two times a day  pantoprazole    Tablet 40 milliGRAM(s) Oral before breakfast  potassium phosphate / sodium phosphate powder 1 Packet(s) Oral three times a day before meals  spironolactone 25 milliGRAM(s) Oral daily    MEDICATIONS  (PRN):  acetaminophen   Tablet .. 650 milliGRAM(s) Oral every 6 hours PRN Temp greater or equal to 38C (100.4F), Moderate Pain (4 - 6)  ALBUTerol    90 MICROgram(s) HFA Inhaler 1 Puff(s) Inhalation every 4 hours PRN Shortness of Breath and/or Wheezing  ALBUTerol/ipratropium for Nebulization 3 milliLiter(s) Nebulizer every 6 hours PRN Shortness of Breath and/or Wheezing  zolpidem 5 milliGRAM(s) Oral at bedtime PRN Insomnia    Vitals:  T(F): 97 (10-14-19 @ 05:45), Max: 97.1 (10-13-19 @ 23:32)  HR: 105 (10-14-19 @ 05:45) (89 - 106)  BP: 163/89 (10-14-19 @ 05:45) (139/70 - 171/72)  RR: 20 (10-14-19 @ 05:45) (18 - 20)  SpO2: 100% (10-14-19 @ 05:45) (96% - 100%)  Wt(kg): --58.6 kg    10-13 @ 07:01  -  10-14 @ 07:00  --------------------------------------------------------  IN:    Oral Fluid: 440 mL  Total IN: 440 mL    OUT:  Total OUT: 0 mL    Total NET: 440 mL    PHYSICAL EXAM:  Neuro: Awake, responsive  CV: S1 S2 irregular, + SM  Lungs: diminished to bases with few bibasilar rales   GI: Soft, BS +, ND, NT  Extremities: + LE edema    TELEMETRY: atrial fibrillation    RADIOLOGY:   < from: Xray Chest 1 View- PORTABLE-Routine (10.10.19 @ 11:50) >  Moderate to large right effusion has increased from October 4. There is   also an increasing mild left effusion.    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    IMPRESSION: Increasing effusions.    < end of copied text >    DIAGNOSTIC TESTING:    [x ] Echocardiogram: < from: TTE Echo Doppler w/o Cont (08.23.19 @ 16:30) >   1. Left ventricular ejection fraction, by visual estimation, is 55 to   60%.   2. Normal left ventricular internal cavity size.   3. Spectral Doppler shows restrictive pattern of left ventricular  myocardial filling (Grade III diastolic dysfunction).   4. Normal right ventricular size and function.   5. The left atrium is normal in size.   6. The right atrium is normal in size.   7. There is no evidence of pericardial effusion.   8. Mild to moderate mitral annular calcification.   9. Mild to moderate mitral valve regurgitation.  10. Thickening of the anterior and posterior mitral valve leaflets.  11. Structurally normal tricuspid valve, with normal leaflet excursion.  12. Moderate aorticregurgitation.  13. Structurally normal pulmonic valve, with normal leaflet excursion.  14. Estimated pulmonary artery systolic pressure is 47.9 mmHg assuming a   right atrial pressure of 10 mmHg, which is consistent with mild pulmonary   hypertension.  15. Mitral valve mean gradient is 4.9 mmHg consistent with mild mitral   stenosis.    < end of copied text >    LABS:	 	    12 Oct 2019 08:46    135    |  95     |  25     ----------------------------<  137    4.0     |  35     |  0.73                        10.1   7.73  )-----------( 332      ( 12 Oct 2019 08:46 )             33.4

## 2019-10-14 NOTE — DISCHARGE NOTE PROVIDER - CARE PROVIDER_API CALL
Alejo Stuart)  Medicine  [address] [place] [address]  Phone: [phone]  Fax: [phone]  Follow Up Time:     Dino Choudhary)  Cardiology; Cardiovascular Disease; Nuclear Cardiology  [address] [place] [address]  Phone: [phone]  Fax: [phone]  Follow Up Time:

## 2019-10-14 NOTE — DISCHARGE NOTE NURSING/CASE MANAGEMENT/SOCIAL WORK - NSDCPEPT PROEDHF_GEN_ALL_CORE
Low salt diet/Activities as tolerated/Call primary care provider for follow up after discharge/Report signs and symptoms to primary care provider/Monitor weight daily

## 2019-10-14 NOTE — PROGRESS NOTE ADULT - PROVIDER SPECIALTY LIST ADULT
Cardiology
Internal Medicine
Nephrology
Nephrology
Cardiology
Nephrology
Cardiology
Cardiology

## 2019-10-14 NOTE — DISCHARGE NOTE PROVIDER - HOSPITAL COURSE
86 YO F with a PMH of CAD s/p CABG, HFpEF, HTN, GERD, OA s/p bilateral TKR presents to the ED for five days of shortness of breath.  Patient reports increased dyspnea on exertion and at rest during that time.  Found to be in rapid Afib with HRs 130-140s, started on Cardizem gtt with HRs improved with 70-90s    Kellie neg x 1    Echo 8/2019: LVEF 60%, DD, mild-mod MR, mod AI, mild MS/pHTN        Acute on chronic diastolic HF, EF 55-60%, Gr III DD    Atrial fibrillation with RVR    B/L pleural effusions    Pulmonary HTN- mild    CAD Hx, s/p CABG    HTN    GERD    Hyponatremia    VHD ( mild-mod MR, mod AR, mild MS)

## 2019-10-14 NOTE — PROGRESS NOTE ADULT - SUBJECTIVE AND OBJECTIVE BOX
[facility] NEPHROLOGY SERVICES, [facility]  NEPHROLOGY AND HYPERTENSION  [address]  [address]  [place] [address]  [phone]    MD BIN HALL MD ANDREY GONCHARUK, MD MADHU KORRAPATI, MD YELENA ROSENBERG, MD BINNY KOSHY, MD CHRISTOPHER CAPUTO, MD EDWARD BOVER, MD          Patient better no sob ; comfortable; conversive;     MEDICATIONS  (STANDING):  apixaban 2.5 milliGRAM(s) Oral every 12 hours  aspirin enteric coated 81 milliGRAM(s) Oral daily  atorvastatin 20 milliGRAM(s) Oral at bedtime  Biotene Dry Mouth Oral Rinse 5 milliLiter(s) Swish and Spit daily  buDESOnide  80 MICROgram(s)/formoterol 4.5 MICROgram(s) Inhaler 2 Puff(s) Inhalation two times a day  diltiazem    milliGRAM(s) Oral daily  docusate sodium 100 milliGRAM(s) Oral three times a day  FLUoxetine 40 milliGRAM(s) Oral daily  furosemide   Injectable 60 milliGRAM(s) IV Push every 12 hours  influenza   Vaccine 0.5 milliLiter(s) IntraMuscular once  lisinopril 20 milliGRAM(s) Oral daily  metoprolol tartrate 50 milliGRAM(s) Oral two times a day  pantoprazole    Tablet 40 milliGRAM(s) Oral before breakfast  potassium phosphate / sodium phosphate powder 1 Packet(s) Oral three times a day before meals  spironolactone 25 milliGRAM(s) Oral daily    MEDICATIONS  (PRN):  acetaminophen   Tablet .. 650 milliGRAM(s) Oral every 6 hours PRN Temp greater or equal to 38C (100.4F), Moderate Pain (4 - 6)  ALBUTerol    90 MICROgram(s) HFA Inhaler 1 Puff(s) Inhalation every 4 hours PRN Shortness of Breath and/or Wheezing  ALBUTerol/ipratropium for Nebulization 3 milliLiter(s) Nebulizer every 6 hours PRN Shortness of Breath and/or Wheezing  zolpidem 5 milliGRAM(s) Oral at bedtime PRN Insomnia      10-13-19 @ 07:01  -  10-14-19 @ 07:00  --------------------------------------------------------  IN: 440 mL / OUT: 0 mL / NET: 440 mL      PHYSICAL EXAM:      T(C): 36.4 (10-14-19 @ 11:10), Max: 36.4 (10-14-19 @ 11:10)  HR: 94 (10-14-19 @ 12:15) (92 - 106)  BP: 143/60 (10-14-19 @ 11:10) (139/74 - 171/72)  RR: 20 (10-14-19 @ 11:10) (18 - 20)  SpO2: 94% (10-14-19 @ 12:15) (94% - 100%)  Wt(kg): --  Respiratory: clear anteriorly, decreased BS at bases  Cardiovascular: S1 S2  Gastrointestinal: soft NT ND +BS  Extremities:  1 edema                10-14    137  |  96  |  24<H>  ----------------------------<  156<H>  3.3<L>   |  36<H>  |  0.67    Ca    8.9      14 Oct 2019 11:38  Mg     1.5     10-14          Creatinine Trend: 0.67<--, 0.73<--, 0.78<--, 0.85<--, 1.03<--, 0.78<--      Assessment   Hypervolemic  hyponatremia, chronic;   Hypokalemia; hypomag    Plan:  Continue loop diuretic;   Change to PO diuretics   Replete Mg;   Will follow.  Lyle Heaton MD [facility] NEPHROLOGY SERVICES, [facility]  NEPHROLOGY AND HYPERTENSION  [address]  [address]  [place] [address]  [phone]    MD BIN HALL MD ANDREY GONCHARUK, MD MADHU KORRAPATI, MD YELENA ROSENBERG, MD BINNY KOSHY, MD CHRISTOPHER CAPUTO, MD EDWARD BOVER, MD          Patient better no sob ; comfortable; conversive;     MEDICATIONS  (STANDING):  apixaban 2.5 milliGRAM(s) Oral every 12 hours  aspirin enteric coated 81 milliGRAM(s) Oral daily  atorvastatin 20 milliGRAM(s) Oral at bedtime  Biotene Dry Mouth Oral Rinse 5 milliLiter(s) Swish and Spit daily  buDESOnide  80 MICROgram(s)/formoterol 4.5 MICROgram(s) Inhaler 2 Puff(s) Inhalation two times a day  diltiazem    milliGRAM(s) Oral daily  docusate sodium 100 milliGRAM(s) Oral three times a day  FLUoxetine 40 milliGRAM(s) Oral daily  furosemide   Injectable 60 milliGRAM(s) IV Push every 12 hours  influenza   Vaccine 0.5 milliLiter(s) IntraMuscular once  lisinopril 20 milliGRAM(s) Oral daily  metoprolol tartrate 50 milliGRAM(s) Oral two times a day  pantoprazole    Tablet 40 milliGRAM(s) Oral before breakfast  potassium phosphate / sodium phosphate powder 1 Packet(s) Oral three times a day before meals  spironolactone 25 milliGRAM(s) Oral daily    MEDICATIONS  (PRN):  acetaminophen   Tablet .. 650 milliGRAM(s) Oral every 6 hours PRN Temp greater or equal to 38C (100.4F), Moderate Pain (4 - 6)  ALBUTerol    90 MICROgram(s) HFA Inhaler 1 Puff(s) Inhalation every 4 hours PRN Shortness of Breath and/or Wheezing  ALBUTerol/ipratropium for Nebulization 3 milliLiter(s) Nebulizer every 6 hours PRN Shortness of Breath and/or Wheezing  zolpidem 5 milliGRAM(s) Oral at bedtime PRN Insomnia      10-13-19 @ 07:01  -  10-14-19 @ 07:00  --------------------------------------------------------  IN: 440 mL / OUT: 0 mL / NET: 440 mL      PHYSICAL EXAM:      T(C): 36.4 (10-14-19 @ 11:10), Max: 36.4 (10-14-19 @ 11:10)  HR: 94 (10-14-19 @ 12:15) (92 - 106)  BP: 143/60 (10-14-19 @ 11:10) (139/74 - 171/72)  RR: 20 (10-14-19 @ 11:10) (18 - 20)  SpO2: 94% (10-14-19 @ 12:15) (94% - 100%)  Wt(kg): --  Respiratory: clear anteriorly, decreased BS at bases  Cardiovascular: S1 S2  Gastrointestinal: soft NT ND +BS  Extremities:  1 edema                10-14    137  |  96  |  24<H>  ----------------------------<  156<H>  3.3<L>   |  36<H>  |  0.67    Ca    8.9      14 Oct 2019 11:38  Mg     1.5     10-14          Creatinine Trend: 0.67<--, 0.73<--, 0.78<--, 0.85<--, 1.03<--, 0.78<--      Assessment   Hypervolemic  hyponatremia, chronic;   Hypokalemia; hypomag    Plan:   Change to PO diuretics   Replete Mg; Phos; Potassium;   Will follow.    Lyle Heaton MD

## 2019-10-14 NOTE — PROGRESS NOTE ADULT - REASON FOR ADMISSION
dyspnea

## 2019-10-14 NOTE — DISCHARGE NOTE NURSING/CASE MANAGEMENT/SOCIAL WORK - PATIENT PORTAL LINK FT
You can access the FollowMyHealth Patient Portal offered by [facility] by registering at the following website: [email or web address]. By joining [employer]’s FollowMyHealth portal, you will also be able to view your health information using other applications (apps) compatible with our system.

## 2019-10-15 ENCOUNTER — INBOUND DOCUMENT (OUTPATIENT)
Age: 84
End: 2019-10-15

## 2019-10-18 DIAGNOSIS — E87.1 HYPO-OSMOLALITY AND HYPONATREMIA: ICD-10-CM

## 2019-10-18 DIAGNOSIS — J44.9 CHRONIC OBSTRUCTIVE PULMONARY DISEASE, UNSPECIFIED: ICD-10-CM

## 2019-10-18 DIAGNOSIS — R06.02 SHORTNESS OF BREATH: ICD-10-CM

## 2019-10-18 DIAGNOSIS — E87.6 HYPOKALEMIA: ICD-10-CM

## 2019-10-18 DIAGNOSIS — I11.0 HYPERTENSIVE HEART DISEASE WITH HEART FAILURE: ICD-10-CM

## 2019-10-18 DIAGNOSIS — Z79.82 LONG TERM (CURRENT) USE OF ASPIRIN: ICD-10-CM

## 2019-10-18 DIAGNOSIS — I08.3 COMBINED RHEUMATIC DISORDERS OF MITRAL, AORTIC AND TRICUSPID VALVES: ICD-10-CM

## 2019-10-18 DIAGNOSIS — Z95.1 PRESENCE OF AORTOCORONARY BYPASS GRAFT: ICD-10-CM

## 2019-10-18 DIAGNOSIS — I25.10 ATHEROSCLEROTIC HEART DISEASE OF NATIVE CORONARY ARTERY WITHOUT ANGINA PECTORIS: ICD-10-CM

## 2019-10-18 DIAGNOSIS — I50.33 ACUTE ON CHRONIC DIASTOLIC (CONGESTIVE) HEART FAILURE: ICD-10-CM

## 2019-10-18 DIAGNOSIS — Z96.653 PRESENCE OF ARTIFICIAL KNEE JOINT, BILATERAL: ICD-10-CM

## 2019-10-18 DIAGNOSIS — K21.9 GASTRO-ESOPHAGEAL REFLUX DISEASE WITHOUT ESOPHAGITIS: ICD-10-CM

## 2019-10-18 DIAGNOSIS — I25.2 OLD MYOCARDIAL INFARCTION: ICD-10-CM

## 2019-10-18 DIAGNOSIS — Z91.011 ALLERGY TO MILK PRODUCTS: ICD-10-CM

## 2019-10-18 DIAGNOSIS — J90 PLEURAL EFFUSION, NOT ELSEWHERE CLASSIFIED: ICD-10-CM

## 2019-10-18 DIAGNOSIS — I48.91 UNSPECIFIED ATRIAL FIBRILLATION: ICD-10-CM

## 2020-04-23 NOTE — OCCUPATIONAL THERAPY INITIAL EVALUATION ADULT - KINESTHESIA, RUE, OT EVAL
"  RSAHARD AGUAYO    Patient Age: 37year old   Refill request by: Phone. Patient wants a call back? Yes Ok to leave response (including medical information) on answering machine  Refill to be: ePrescribed to [facility], [address] [place] [address]    Medication requested to be refilled:   escitalopram (LEXAPRO) 5 MG tablet    Medication prescribe on 02/24/20, per Horacio grier med check in 4-6 week, please advice ok to book tele visit or needs to be seen in the office after June     Pt states she took her last pill today, no medication left    Routed to Provider's Clinical Pool     WEIGHT AND HEIGHT:   Wt Readings from Last 1 Encounters:   09/09/19 89.8 kg (198 lb)     Ht Readings from Last 1 Encounters:   09/09/19 5' 6"" (1.676 m)     BMI Readings from Last 1 Encounters:   09/09/19 31.96 kg/mÂ²       ALLERGIES: no known allergies. Current Outpatient Medications   Medication   â¢ escitalopram (LEXAPRO) 5 MG tablet   â¢ ALPRAZolam (XANAX) 2 MG tablet   â¢ HYDROcodone-acetaminophen (NORCO)  MG per tablet     No current facility-administered medications for this visit.       PHARMACY to use:           Pharmacy preference(s) on file:   [address] [address]  Phone: [phone] Fax: [phone] INFO: Dragan Pierson to leave response (including medical information) on answering machine  ROUTING: Patient's physician/staff        PCP: No Pcp         INS: Payor: [address] [place] [address] Road / Plan: PPO [id number] / Product Type: PPO MISC   PATIENT ADDRESS:  [id number] [place] [address] [address] [id number]    " within normal limits

## 2020-09-01 NOTE — H&P ADULT - NSHPPOADEEPVENOUSTHROMB_GEN_A_CORE
How Severe Are Your Spot(S)?: moderate
What Type Of Note Output Would You Prefer (Optional)?: Bullet Format
What Is The Reason For Today's Visit?: Full Body Skin Examination
What Is The Reason For Today's Visit? (Being Monitored For X): concerning skin lesions on an annual basis
no

## 2021-01-01 NOTE — PHYSICAL THERAPY INITIAL EVALUATION ADULT - GENERAL OBSERVATIONS, REHAB EVAL
Minimal Pt was seen in supine c cardiac monitor and Pt had supplemental O2 donned throughout P.T. intervention. Pt needed frequent rest while performing task to prevent SOB. at 4l/min  through nasal cannula, alert and Ox4. Pt was comfortable and motivated. Pt had supplemental Pt had supplemental O2 donned throughout P.T. intervention.
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
## 2021-04-21 NOTE — PHYSICAL THERAPY INITIAL EVALUATION ADULT - ADL SKILLS, REHAB EVAL
Admission Report at [facility]  [facility] admitted your patient to [address] [place] [address] service with the following disciplines:      PT and OT  Response needed, please respond via tiger text vs work phone [phone] for a verbal order for [facility]  Primary focus of home health care Nephrology    Patient stated goals of care to get back to mobility like she was 3 weeks ago  to be able to be left alone during the day without any problem with mobility  to be able to get OOB  Anticipated visit pattern Starting week of 04/25/21  2wk3 and 1wk2    Significant clinical findings inc SOB and fatigue during and following minimal activity  needs Ax2 for sit to stand transfers  able to ambulate short household distances room to room with seated rest breaks with use of rollator  Request for additional disciplines [facility]    Request for medication clarification duplication of Vit D3  Calcium vit D and senna and psyllium    Potential barriers to goal achievement dec endurance and limited activity tolerance  needs significant A for mobility  Other pertinent information very supportive family    Thank you for allowing us to participate in the care of your patient        Gildardo Lazo, PT [address] [place]/needs device/independent

## 2021-06-15 NOTE — ED ADULT NURSE NOTE - NSFALLRSKINDICATORS_ED_ALL_ED
Detail Level: Zone Render In Strict Bullet Format?: No Initiate Treatment: Ketaconozole shampoo 2% and fluocinonide 0.05% solution no

## 2021-06-20 NOTE — PATIENT PROFILE ADULT. - AS SC BRADEN ACTIVITY
(2) chairfast
CAPILLARY BLOOD GLUCOSE      POCT Blood Glucose.: 277 mg/dL (2021 07:26)  POCT Blood Glucose.: 234 mg/dL (2021 03:44)  POCT Blood Glucose.: 273 mg/dL (2021 02:24)  POCT Blood Glucose.: 251 mg/dL (2021 00:29)  POCT Blood Glucose.: 304 mg/dL (2021 23:33)                          13.7   9.36  )-----------( 226      ( 2021 20:42 )             40.9     06-    138  |  105  |  21  ----------------------------<  318<H>  4.7   |  30  |  1.05    Ca    9.3      2021 20:42  Mg     2.4         TPro  8.2  /  Alb  3.1<L>  /  TBili  0.3  /  DBili  x   /  AST  22  /  ALT  26  /  AlkPhos  72  -19    PT/INR - ( 2021 20:42 )   PT: 12.1 sec;   INR: 1.05 ratio         PTT - ( 2021 20:42 )  PTT:24.6 sec      Urinalysis Basic - ( 2021 23:18 )    Color: Yellow / Appearance: Clear / S.015 / pH: x  Gluc: x / Ketone: Negative  / Bili: Negative / Urobili: Negative mg/dL   Blood: x / Protein: 500 mg/dL / Nitrite: Negative   Leuk Esterase: Negative / RBC: 3-5 /HPF / WBC 0-2   Sq Epi: x / Non Sq Epi: Occasional / Bacteria: Occasional

## 2021-12-01 NOTE — ED BEHAVIORAL HEALTH ASSESSMENT NOTE - NS ED [facility] DEMOGRAPHICS MEDICAL RECORD REVIEWED YES RECORDS
Alma Rosa presents today with No chief complaint on file.    HISTORY OF PRESENT ILLNESS:   Alma Rosa Velarde is a very pleasant 55 year old female for follow up of type 2 diabetes.  Patient was diagnosed with diabetes 5-6 years ago.  She has complications including peripheral neuropathy, cardiovascular disease, Charcot deformity, and history of diabetic foot ulcers.  Her last A1c was 7.3% on 10/25/2021; previous was 8.5% in June.  At her last visit in October with Dr. Sorto, Victoza decreased and insulin adjusted.  Levemir decreased further at visit with Diabetes Education in November.  Verbal permission given by *** for *** to be present during office visit today.    Her significant PMH includes obesity, GERD, depression/anxiety (follows with Pyschiatry), hypertension, hypercholesteremia, coronary artery disease/MI s/p PCI, Charcot deformity s/p right below knee amputation (08/2021), COPD    Alma Rosa's current diabetic medications prescribed are:  Glipizide 10 mg 1 tablet every morning; 1/2 tablet every evening  Levemir 30 units every morning and 15 units every evening  Victoza 1.2 mg daily  Metformin 1000 mg BID    Compliant/Self-management barriers:  ***  Date of last appointment with Diabetes Education/Dietitian: 11/09/2021    Previous diabetic medications and/or medical devices:   Pioglitazone   Farxiga  Novolog     Glucagon:  ***  Carries a form of glucose at all times:  {Yes_No_---:900085}  Ketone strips: ***    Injection sites: ***  Rotating: ***    Eating Patterns:  Encouraged to target 30-45 grams of carbohydrates with each meal and 15-30 grams with each snack.    Physical Activity:  ***  Sleep Behaviors:  ***    Meter or sensor downloaded for her appointment.  (*** to ***)  Average Readings/Day: ***  Fasting average:  ***  Pre-lunch average:  ***  Pre-supper average:  ***  Bedtime average:  ***    DIABETES HISTORY:   Alma Rosa has type 2 diabetes.  Diagnosed: 2015/2016.  Her disease course has been  {STABLE:124728}.  Previous hypoglycemic symptoms include {Diabetes S/S:081847}.  Previous hyperglycemic symptoms include {SABHYPER:181995}.   Diabetes complications include peripheral neuropathy, cardiovascular disease, Charcot deformity, and history of diabetic foot ulcers.  She {does/does not:450761} have hypoglycemia unawareness.      DIABETIC HEALTH MAINTENANCE:  The ASCVD Risk score (Giles LAUGHLIN Jr., et al., 2013) failed to calculate for the following reasons:    The patient has a prior MI or stroke diagnosis  Is patient on an ACE/ARB?:  Yes  Is patient on aspirin therapy?:  Yes  Is patient on statin/fibrate therapy?:  Yes  Date of last ophthalmic exam: ***  See nurses' notes for further health maintenance    DIABETES LABS:  Hemoglobin A1C (%)   Date Value   10/25/2021 7.3 (H)   06/18/2021 8.5 (H)   03/09/2021 8.2 (H)   12/07/2020 7.5 (H)     GLUCOSE, BEDSIDE - POINT OF CARE (mg/dL)   Date Value   08/27/2021 202 (H)   08/27/2021 163 (H)     Hemoglobin A1C, POC (%)   Date Value   10/15/2021 6.8 (A)      Cholesterol (mg/dL)   Date Value   06/18/2021 126   03/09/2021 118     HDL (mg/dL)   Date Value   06/18/2021 30 (L)   03/09/2021 30 (L)     LDL (mg/dL)   Date Value   06/18/2021 60   03/09/2021 47     Cholesterol/ HDL Ratio (no units)   Date Value   06/18/2021 4.2   03/09/2021 3.9     Triglycerides (mg/dL)   Date Value   06/18/2021 181 (H)   03/09/2021 203 (H)     MICROALBUMIN/CREATININE (mg/g)   Date Value   04/28/2019 19.4     Lab Results   Component Value Date    SODIUM 142 10/25/2021    POTASSIUM 3.8 10/25/2021    CHLORIDE 102 10/25/2021    CO2 29 10/25/2021    GLUCOSE 175 (H) 10/25/2021    BUN 8 10/25/2021    CREATININE 0.82 10/25/2021    GFRA 51 01/14/2020    GFRNA 44 01/14/2020    AST 11 03/09/2021    GPT 11 03/09/2021    ALKPT 76 03/09/2021    ALBUMIN 3.2 (L) 03/09/2021    RBC 3.45 (L) 08/27/2021    HGB 8.2 (L) 08/27/2021    HCT 27.8 (L) 08/27/2021     TSH (mcUnits/mL)   Date Value   10/25/2021 1.129        SIGNIFICANT ACTIVE PROBLEMS:   Patient Active Problem List   Diagnosis   • Neuropathy, inflammatory or toxic; consider etiology superimposed upon DM jesu given neuropathy sxs predated DM; could have PN with prediabetes/metabolic syndrome   • Diabetic polyneuropathy associated with type 2 diabetes mellitus (CMS/HCC)   • Neuropathic pain: BLE, both hands and R T6 trunk   • Lesion of right ulnar nerve   • Lumbosacral root lesion; R S1 chronic active per EMG on 11/30/15   • Benign essential hypertension   • PA (psoriatic arthritis) (CMS/HCC)   • Coronary artery disease involving native coronary artery of native heart without angina pectoris   • COPD, moderate (CMS/HCC)   • Closed fracture of left ankle   • History of placement of stent in LAD coronary artery   • STEMI involving left anterior descending coronary artery (CMS/HCC)   • Acute deep vein thrombosis (DVT) of brachial vein of right upper extremity (CMS/HCC)   • Cellulitis due to methicillin-resistant Staphylococcus aureus (MRSA)   • Moderate episode of recurrent major depressive disorder (CMS/[facility])   • JL (generalized anxiety disorder)   • Venous stasis   • Deformity of foot   • 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 Take along with 75 mg and 37.5 mg capsule daily. 90 capsule 1   • venlafaxine XR (EFFEXOR XR) 37.5 MG 24 hr capsule Take 1 capsule by mouth daily. In addition to 150 mg and 75 mg daily  90 capsule 1   • venlafaxine XR (EFFEXOR XR) 75 MG 24 hr capsule Take 1 capsule by mouth daily. Take along with 150 mg and 37.5 mg daily 90 capsule 1   • [START ON 12/17/2021] HYDROcodone-acetaminophen (NORCO) 7.5-325 MG per tablet Take 1 tablet by mouth every 6 hours as needed. Do not start before December 17, 2021. 120 tablet 0   • HYDROcodone-acetaminophen (NORCO) 7.5-325 MG per tablet Take 1 tablet by mouth every 6 hours as needed. 120 tablet 0   • pregabalin (LYRICA) 200 MG capsule Take 1 capsule by mouth 4 times daily. 120 capsule 1   • nortriptyline (PAMELOR) 50 MG capsule Take 1 capsule by mouth daily. 30 capsule 4   • [START ON 12/17/2021] oxyCODONE ER (Xtampza ER) 18 MG Capsule Extended Release 12 hour Abuse-Deterrent Take 1 capsule by mouth every 12 hours. Do not start before December 17, 2021. 60 capsule 0   • oxyCODONE ER (Xtampza ER) 18 MG Capsule Extended Release 12 hour Abuse-Deterrent Take 1 capsule by mouth every 12 hours. 60 capsule 0   • glipiZIDE (GLUCOTROL) 10 MG tablet 10 mg in the morning, 5 mg in the evening 180 tablet 3   • insulin detemir (LEVEMIR FLEXTOUCH) 100 UNIT/ML pen-injector 30 units in the morning and 15 units at bedtime 15 mL 3   • liraglutide (VICTOZA) 18 MG/3ML pen-injector Inject 1.2 mg into the skin daily. 18 mL 3   • ARIPiprazole (ABILIFY) 5 MG tablet Take 1 tablet by mouth daily. 30 tablet 3   • losartan (COZAAR) 100 MG tablet Take 1 tablet by mouth daily. 90 tablet 0   • albuterol (ProAir HFA) 108 (90 Base) MCG/ACT inhaler Inhale 2 puffs into the lungs every 4 hours as needed for Shortness of Breath or Wheezing. 8.5 g 0   • HYDROcodone-acetaminophen (NORCO) 7.5-325 MG per tablet Take 1 tablet by mouth every 6 hours as needed. 120 tablet 0   • HYDROcodone-acetaminophen (NORCO) 7.5-325 MG per tablet Take 1 tablet by mouth every 6 hours as needed. 120 tablet 0   • pregabalin (LYRICA) 225 MG capsule Take 1 capsule by mouth 4 times daily. 120 capsule 1   • nortriptyline  (PAMELOR) 50 MG capsule Take 1 capsule by mouth daily. 30 capsule 5   • oxyCODONE ER (Xtampza ER) 18 MG Capsule Extended Release 12 hour Abuse-Deterrent Take 1 capsule by mouth every 12 hours. 60 capsule 0   • oxyCODONE ER (Xtampza ER) 18 MG Capsule Extended Release 12 hour Abuse-Deterrent Take 1 capsule by mouth every 12 hours. 60 capsule 0   • ferrous sulfate 325 (65 FE) MG tablet Take 1 tablet by mouth 3 times daily. 90 tablet 0   • atorvastatin (LIPITOR) 80 MG tablet Take 1 tablet by mouth nightly. 90 tablet 3   • polyethylene glycol (MIRALAX) 17 g packet Take 17 g by mouth daily as needed (constipation). Stir and dissolve powder in any 4 to 8 ounces of beverage, then drink. 1 each 0   • naLOXone (NARCAN) 4 MG/0.1ML nasal spray Spray the content of 1 device into 1 nostril. Call 911. May repeat with 2nd device in alternate nostril if no response in 2-3 minutes. 2 each 1   • nystatin (MYCOSTATIN) 743370 UNIT/GM powder Apply topically 3 times daily. 60 g 3   • fluticasone (FLONASE) 50 MCG/ACT nasal spray Spray 2 sprays in each nostril daily. 16 g 0   • pantoprazole (PROTONIX) 40 MG tablet Take 1 tablet by mouth 2 times daily. 180 tablet 3   • metformin (GLUCOPHAGE) 1000 MG tablet Take 1 tablet by mouth 2 times daily (with meals). 180 tablet 3   • metoPROLOL succinate (TOPROL-XL) 100 MG 24 hr tablet Take 0.5 tablets by mouth 2 times daily. 90 tablet 3   • Insulin Pen Needle (B-D U/F PEN NEEDLE 5/16\") 31G X 8 MM Misc Use to inject insulin 2 times daily. Remove needle cover(s) to expose needle before injecting. 200 each 3   • HYDROcodone-acetaminophen (NORCO) 7.5-325 MG per tablet Take 1 tablet by mouth every 6 hours as needed. 120 tablet 0   • pregabalin (LYRICA) 225 MG capsule Take 1 capsule by mouth 4 times daily. 120 capsule 2   • oxyCODONE ER (Xtampza ER) 18 MG Capsule Extended Release 12 hour Abuse-Deterrent Take 1 capsule by mouth every 12 hours. 60 capsule 0   • pregabalin (LYRICA) 225 MG capsule Take 1  capsule by mouth 4 times daily. 120 capsule 1   • oxyCODONE ER (Xtampza ER) 18 MG Capsule Extended Release 12 hour Abuse-Deterrent Take 1 capsule by mouth every 12 hours. 60 capsule 0   • nortriptyline (PAMELOR) 25 MG capsule Take 1 capsule by mouth daily. (Patient taking differently: Take 25 mg by mouth nightly. ) 30 capsule 1   • bumetanide (BUMEX) 2 MG tablet Take 1 tablet by mouth 2 times daily. 60 tablet 3   • potassium CHLORIDE (KLOR-CON M) 20 MEQ gt ER tablet Take 2 tablets by mouth 2 times daily. (Patient taking differently: Take 20 mEq by mouth daily. ) 120 tablet 5   • magnesium lactate (MAGTAB) 84 MG (7MEQ) Tab CR Take 2 tablets by mouth 2 times daily (with meals). 120 tablet 5   • DISPENSE Please fit and dispense double upright metal AFO for right lower extremity   Please fit and dispense extra depth diabetic shoes with custom offloading multidensity inserts 1 Device 0   • fenofibrate micronized (LOFIBRA) 134 MG capsule Take 1 capsule by mouth daily (before breakfast). 30 capsule 11   • isosorbide mononitrate (IMDUR) 30 MG 24 hr tablet Take 1 tablet by mouth daily. (Patient taking differently: Take 30 mg by mouth every morning. ) 90 tablet 3   • DISPENSE Please fit and dispense a left lower extremity CROW boot 1 Device 0   • hydrOXYzine (ATARAX) 25 MG tablet Take half a tab to 2 tabs every 6 hours as needed for anxiety. 60 tablet 3   • ibuprofen (MOTRIN) 200 MG tablet Take 400 mg by mouth every 6 hours as needed for Pain.     • triamcinolone (ARISTOCORT) 0.1 % cream Apply topically 2 times daily. 30 g 11   • nystatin-triamcinolone (MYCOLOG II) 192044-5.1 UNIT/GM-% cream Apply to corner of mouth twice daily until healed 30 g 3   • GuaiFENesin 1200 MG 12 hr tablet Take 1 tablet by mouth every 12 hours. 180 tablet 3   • ipratropium-albuterol (DUONEB) 0.5-2.5 (3) MG/3ML nebulizer solution Take 3 mLs by nebulization every 4 hours as needed for Wheezing or Shortness of Breath. 360 mL 12   • ASPIRIN 81 PO  Take 81 mg by mouth every morning. Prescribed by Cardiologist     • umeclidinium-vilanterol (Anoro Ellipta) 62.5-25 MCG/INH inhaler Inhale 1 puff into the lungs daily. 60 each 11   • Cholecalciferol (VITAMIN D PO) Take 5,000 Units by mouth daily.        No current facility-administered medications for this visit.       PAST MEDICAL HISTORY:  Past Medical History:   Diagnosis Date   • Acid reflux    • Charcot foot due to diabetes mellitus (CMS/[facility])    • Chronic pain    • Congestive cardiac failure (CMS/[facility])    • COPD (chronic obstructive pulmonary disease) (CMS/[facility])    • Coronary artery disease    • Depression    • Diabetes (CMS/[facility])    • Disc degeneration, lumbar    • Foot deformity    • Foot ulcer (CMS/[facility])     right foot admission 2/16/20   • Fracture    • JL (generalized anxiety disorder) 12/22/2019   • High blood pressure    • High cholesterol    • Idiopathic peripheral neuropathy    • MI (myocardial infarction) (CMS/[facility]) 05/2018   • MRSA (methicillin resistant Staphylococcus aureus)    • Obesity (BMI 30-39.9)    • Osteomyelitis (CMS/[facility])    • Peripheral neuropathy    • Personal history of diseases of the skin and subcutaneous tissue    • Pneumonia    • Psoriasis    • Psoriatic arthropathy (CMS/[facility])    • Sacroiliitis (CMS/[facility])    • Spondylolisthesis, lumbar region    • Status post insertion of drug-eluting stent into left anterior descending (LAD) artery    • Trouble swallowing 07/2021    Patient stated that she had choked on her food at a wedding July 2021. This is new and she needs to see her doctor.   • Venous stasis         FAMILY HISTORY:  Family History   Problem Relation Age of Onset   • Diabetes Father    • Heart disease Father    • High blood pressure Father    • Gastrointestinal Father    • Myocardial Infarction Father    • Cancer Mother 70        Breast    • High blood pressure Mother    • Stroke Mother    • Diabetes Mother    • Hypertension Mother    • Glaucoma Sister    • Diabetes Sister    •  Hypertension Sister    • Genitourinary Sister         bladder dysfunction   • Seizure Disorder Sister    • Cancer Sister         Thyroid   • Sleep Apnea Sister    • Asthma Daughter    • Allergic Rhinitis Daughter        SOCIAL HISTORY:  Social History     Tobacco Use   • Smoking status: Light Tobacco Smoker     Packs/day: 1.50     Years: 35.00     Pack years: 52.50     Types: Cigarettes     Last attempt to quit: 3/30/2019     Years since quittin.6   • Smokeless tobacco: Never Used   Vaping Use   • Vaping Use: never used   Substance Use Topics   • Alcohol use: Not Currently   • Drug use: Never     Comment: Coffee \"all day long\" 4-8 cups per day, has soda near bedtime        ALLERGIES:  ALLERGIES:   Allergen Reactions   • Mirtazapine Other (See Comments)     Horrible dreams, hallucinating, bizarre behavior   • Adhesive   (Environmental) RASH   • Bactrim Ds Other (See Comments)     Muscle pain, dizziness and sweating   • Codeine NAUSEA   • Erythromycin GI UPSET   • Hydrochlorothiazide MYALGIA   • Latex   (Environmental) HIVES   • Macrolides And Ketolides Other (See Comments)   • Nickel Other (See Comments)   • Nucynta [Tapentadol] RASH   • Wellbutrin [Bupropion] HIVES   • Amitriptyline Other (See Comments)     Excessive sleep and bad dreams.       REVIEW OF SYSTEMS:                GENERAL/CONSTITUTIONAL GASTROINTESTINAL   [] YES   [x] NO  Excessive fatigue [] YES   [x] NO  Abdominal pain   [] YES   [x] NO  Excessive weakness [] YES   [x] NO  Indigestion-heartburn   [] YES   [x] NO  Unexplained weight loss [] YES   [x] NO  Nausea   [] YES   [x] NO  Unexplained weight gain [] YES   [x] NO  Vomiting   [] YES   [x] NO  Change in appetite [] YES   [x] NO  Diarrhea   [] YES   [x] NO  Fevers [] YES   [x] NO  Constipation   [] YES   [x] NO  Chills [] YES   [x] NO  Black or blood stools   [] YES   [x] NO  Cold intolerance    [] YES   [x] NO  Heat intolerance GENITOURINARY   [] YES   [x] NO  Excessive sweating [] YES   [x]  NO  Frequent urination   [] YES   [x] NO  Excessive thirst [] YES   [x] NO  Excessive urination   [] YES   [x] NO  Loss of sexual interest [] YES   [x] NO  Difficulty-painful urination    [] YES   [x] NO  Blood in the urine   NEUROLOGIC [] YES   [x] NO  Menstrual problems   [] YES   [x] NO  Frequent headaches    [] YES   [x] NO  Severe headaches MUSCULOSKELETAL   [] YES   [x] NO  Lightheadedness [] YES   [x] NO  Swollen joints   [] YES   [x] NO  Dizziness [] YES   [x] NO  Stiff or painful joints   [] YES   [x] NO  Loss of consciousness [] YES   [x] NO  Neck pain   [] YES   [x] NO  Numbness-tingling hands [] YES   [x] NO  Back pain   [] YES   [x] NO  Numbness-tingling feet [] YES   [x] NO  Muscle pain   [] YES   [x] NO  Tremors    [] YES   [x] NO  Seizures SKIN    [] YES   [x] NO  Rash   HEENT [] YES   [x] NO  Excessive dryness   [] YES   [x] NO  Blurry vision [] YES   [x] NO  Abnormal hair growth   [] YES   [x] NO  Double vision [] YES   [x] NO  Abnormal hair loss   [] YES   [x] NO  Visual disturbance [] YES   [x] NO  Ulcer   [] YES   [x] NO  Eye pain [] YES   [x] NO  New growths or lumps   [] YES   [x] NO  Hoarse/voice changes [] YES   [x] NO  Easy bruising or bleeding   [] YES   [x] NO  Difficult swallowing    [] YES   [x] NO  Neck swelling PSYCHIATRIC    [] YES   [x] NO  Difficulty concentrating   RESPIRATORY [] YES   [x] NO  Changes in mood   [] YES   [x] NO  Frequent cough [] YES   [x] NO  Restlessness   [] YES   [x] NO  Snoring [] YES   [x] NO  Changes in sleep pattern   [] YES   [x] NO  Wheezing [] YES   [x] NO  Suicidal thoughts   [] YES   [x] NO  Unusual SOB [] YES   [x] NO  Homicidal thoughts       CARDIOVASCULAR    [] YES   [x] NO  Chest pain or discomfort    [] YES   [x] NO  Palpitation/irregular beat    [] YES   [x] NO  Leg swelling      On the review of systems checklist today a few concerns were listed.  Nothing acute.  Keeps in regular contact with PCP.    PHYSICAL EXAMINATION:   Visit Vitals  LMP   (LMP Unknown)      General:  Well-nourished appearing female, in no obvious or acute distress.   Psychiatric:  Alert and oriented x3.  Pleasant and appropriate, normal insight.  Neck:  Supple, normal range of motion for age.  No jugular venous distention (JVD), no audible bruits.  No palpable thyroid abnormalities or thyromegaly.    Chest:  Clear to auscultation bilaterally.  No wheezing, no rales.  Respiratory effort is normal, no accessory muscle use.   Cardiovascular:  Heart demonstrates regular rate and rhythm, non-tachycardic.  No audible murmurs or rubs.  Abdomen:  Soft, nontender, nondistended.  Good bowel sounds throughout.  No palpable masses or hepatomegaly.  Absence of lipohypertrophy.  Neurologic:  Sensation intact to light touch in lower extremities.  Able to sense vibration with a 128 Hz tuning fork when applied to the bony prominence at the dorsum of the first toe bilaterally.  Patellar, Achilles, and biceps deep tendon reflexes present 2+ bilaterally.    Diabetic Foot Exam Documentation:  {Diabetic Foot Exam:[id number]}     ASSESSMENT AND PLAN:   No diagnosis found.    It was a pleasure to meet with Alma Rosa today.  Her last A1c was 7.3% reflecting fair control.  Will target an A1c of less than 7% at her next check.       No follow-ups on file.    I have answered all of Alma Rosa's questions and she is comfortable with the plan of care as outlined. Alma Rosa verbalizes understanding of the follow-up plan including office visits and labs.  She agrees to call the provider if having side effects to medications or if having blood sugars less than 70.  She was provided with an After Visit Summary.      Provider spent *** minutes total time on today's encounter including preparation and reviewing chart, counseling patient on treatment plan, ordering necessary laboratory tests and medications, and documentation.                   Hospital chart

## 2022-03-07 NOTE — ED PROVIDER NOTE - CHIEF COMPLAINT
The patient is a 84y Female complaining of pain, lower leg. [Negative] : Heme/Lymph [Fatigue] : no fatigue [Recent Weight Gain (___ Lbs)] : no recent weight gain [Recent Weight Loss (___ Lbs)] : no recent weight loss [Blurred Vision] : no blurred vision [Nausea] : no nausea [Constipation] : no constipation [Vomiting] : no vomiting [Diarrhea] : no diarrhea [Polyuria] : no polyuria [Headaches] : no headaches [Dizziness] : no dizziness [Pain/Numbness of Digits] : no pain/numbness of digits [Polydipsia] : no polydipsia [FreeTextEntry9] : posterior neck pain

## 2022-05-31 NOTE — DISCHARGE NOTE ADULT - PATIENT PORTAL LINK FT
Shift assessment completed, see flowsheets. Medications administered, see MAR. Vital signs stable. Trach with #8 shiley in place with T-piece @ 7L, SPO2 98%. Plan of care discussed with patient. Safety precautions in place. Call light and bedside table within reach. Pt denies further needs at this time. Will continue to monitor.   Berry Ivy RN You can access the [facility] Portal, offered by [facility], by registering with the following website: [email or web address]

## 2022-07-01 NOTE — ED ADULT NURSE NOTE - PAIN: PRECIPITATING/AGGRAVATING FACTORS
activity - Pt recently DC s/p covid infection now with superimposed PNA -Bacterial infection,   - Meet sepsis criteria with fever, tachycardia, leukocytosis.   - Elevated lactate 2.6--> Normal , elevated Pro conner   - CT CAP: Mild-to-moderate patchy airspace disease or consolidation both lower lobes. Mild airspace disease and/or atelectasis at the lingula.  (It is a preliminary result)  - s/p Zosyn and vanco x1 dose, NS 2lt bolus ,   -Tylenol suppository, Zofran IV x1.   - Continue  vanco f& Start Cefepime as d/w ID DR ANA Bhardwaj  - f/u procal, MRSA PCR, lactate, blood cultures x 2 .  -Incentive spirometry , Inh PRN

## 2022-08-18 NOTE — ED ADULT NURSE NOTE - NS ED NURSE RECORD ANOTHER VITAL SIGN
Physical Therapy  Visit Type: treatment  Precautions:  Medical precautions:  fall risk; standard precautions and contact precautions.  The patient is a 66 year old female admitted on 8/5/2022.  Pt admitted with diagnosis of End stage renal disease on dialysis (CMS/[facility]) (N18.6, Z99.2)  Pneumonia of left lower lobe due to infectious organism (J18.9)  Community acquired pneumonia of left lower lobe of lung (J18.9) presents with low back pain and L hip pain.    Prior to admission pt lives in a house, with her daughter, son in law, and grandson. Avoids using steps.  Patient ambulates modified independent  and does require assist for ADLs.    Per chart, pt was feeling nauseous and threw up yesterday.               Lines:     Basic: IV      Lines in chart and on patient reviewed, precautions maintained throughout session.  Safety Measures: bed alarm and chair alarm    SUBJECTIVE  Patient agreed to participate in therapy this date.  \"I want to order breakfast before I work with you\"  Patient / Family Goal: return home     OBJECTIVE     Oriented to person, place, time and situation     Arousal alertness: appropriate responses to stimuli    Affect/Behavior: alert and cooperative  Patient activity tolerance: 1 to 1 activity to rest  Functional Communication/Cognition    Overall status:  Within functional limits    Form of communication:  Verbal   Attention span:  Appears intact    Commands: follows all commands and directions consistently.  Range of Motion (measured in degrees unless otherwise noted, active unless indicated)  WFL: RLE, LLE  Strength (out of 5 unless otherwise indicated)   WFL: LLE, RLE  Balance (trials measured in sec unless otherwise indicted)    Sitting: Static: stand by assist double upper extremity support, Dynamic: stand by assist    Standing - Firm Surface - Eyes Open: Static: stand by assist double upper extremity support  Dynamic: stand by assist double upper extremity support    Transfers:     Assistive devices: gait belt, 2-wheeled walker and 1 person    Sit to stand: stand by assist    Stand to sit: stand by assist  Training completed:    Tasks: sit to stand and stand to sit    Education details: body mechanics, patient safety and patient demonstrates understanding  Gait/Ambulation:     Assistance: contact guard/touching/steadying assist and stand by assist   Assistive device: gait belt, 2-wheeled walker and 1 person    Distance (ft): 60    Pattern: step through    Type: decreased dwayne and shuffling    Surface: even  Training Completed:    Tasks: gait training on level surfaces    Education details: body mechanics and patient safety      Interventions     Seated    Lower Extremity: Bilateral: seated hip flexion, knee flexion and knee extensions, AROM, 10 reps, 2 sets  Skilled input: Verbal instruction/cues and tactile instruction/cues  Verbal Consent: Writer verbally educated and received verbal consent for hand placement, positioning of patient, and techniques to be performed today from patient for therapist position for techniques as described above and how they are pertinent to the patient's plan of care.       ASSESSMENT   Impairments: range of motion, strength, shortness of breath, pain, activity tolerance and endurance  Functional Limitations: ambulation   Pt up in a chair, cleared for tx by RN, pt on iso prec for c-diff; first attempt pt requested for therapist to return later , wanted to order her breakfast; second attempt pt agreed to therapy , was able to walk 60 ft using RW and sba to ensure safety; pt was able to perform LE ex's per grid however due to breakfast arrival tx was ended; educated pt on importance of mobility, pt stated that she walks 3 xday to/from bathroom; will cont PT to ensure safe mobility for disch home.  RN not available for hand off.    Discharge Recommendations  Recommendation for Discharge: PT IL: Patient requires  intermittent assistance to perform mobility and/or  ADLs safely, Patient is appropriate for Physical Therapy 1-3 times per week              Progress: improving as expected    • Skilled therapy is required to address these limitations in attempt to maximize the patient's independence.    Education Provided:   Learning Assessment:  - Primary learner: patient  - Are they ready to learn: yes  - Preferred learning style: verbal  - Barriers to learning: no barriers apparent at this time  Education provided during session:  - Receiving Education: patient  - Results of above outlined education: Verbalizes understanding    Patient at End of Session:   Location: in chair  Safety measures: alarm system in place/re-engaged, call light within reach, lines intact and equipment intact    PLAN   Suggestions for next session as indicated: PT Frequency: 3 days/week      Interventions: strengthening, gait training, safety education, ROM, body mechanics and endurance training  Agreement to plan and goals: patient agrees with goals and treatment plan        GOALS  Review Date: 8/18/2022  Long Term Goals: (to be met by time of discharge from hospital)  Sit to supine: Patient will complete sit to supine independent.  Status: progressing/ongoing  Supine to sit: Patient will complete supine to sit independent.  Status: progressing/ongoing  Sit to stand: Patient will complete sit to stand transfer with gait belt and 2-wheeled walker, modified independent.   Status: progressing/ongoing  Ambulation (even): Patient will ambulate on even surface for 150 feet with gait belt and 2-wheeled walker, modified independent.   Status: progressing/ongoing  3-4 steps: Patient will ambulate 3-4 steps with gait belt using 2 rails, modified independent.     Documented in the chart in the following areas: Assessment.      Therapy procedure time and total treatment time can be found documented on the Time Entry flowsheet   Yes

## 2022-08-28 NOTE — PROGRESS NOTE ADULT - ASSESSMENT
Patient with hyponatremia improving. Add lasix
82 yo F w/ Left ankle fracture s/p fall    Medical management for current comorbitidies  Social work and adult protective services recommended given previous admission and no solution to problem  NWB Left ankle in trilam splint  Keep elevated, Ice, rest  WBAT LUE, no sling needed, okay to use platform walker if patient tolerates  pain control  DVT prophylaxis   will discuss with attending and change plan as needed
[place]
Patient is doing better. Repeat labs
Patient with multiple fractures awaiting rehab.
Patient is stable on meds. Give IV lasix and repeat labs.
Patient hyponatremia is resolving. DC for AM
Patient is better. Not ready for DC yet.
Patient with improving hyponatremia. Will need rehab.

## 2023-03-30 NOTE — H&P ADULT - PROBLEM/PLAN-7
Mom contacted  States patient started throwing up at 3 am this morning. Around 6 episodes, per mom. Mom states last vomiting episode was 2.5 hours ago. Drinking breast milk. Still having wet diapers. No other symptoms noted. Supportive care measures regarding vomiting discussed. If worsens, call back.  Mom verbalized understanding
Patient was late for today's exam with DMR ( DMR was living for [facility] location) mom advised to got to ER,2 hours later mom   come back ( can't wait in ER ) to see if any doctor  Is able can see patient,Schedule was full and  mom  was advise to go to immediate care, OK per mom but mom want to speak with MD or nurse . Please advise .
DISPLAY PLAN FREE TEXT

## 2023-11-27 NOTE — DISCHARGE NOTE ADULT - NS MD DC FALL RISK RISK
Pt spouse called office, has questions regarding medication management discussed at [facility] with Dr. Marisol Rivero. LOV note reviewed. \"Since you are doing well, PMR quiet, stop methotrexate. Only on one methotrexate per week, just stop it. Continue Prednisone 5 mg per day. Sed rate 23 ok  CRP  negative. Anemia present Hemoglobin 10.3  Iron one daily, multivitamin daily  Labs recheck 3 months. Return to office 3 months\"    Voices understanding of above. For information on Fall & Injury Prevention, visit [email or web address]

## 2024-03-25 NOTE — ED PROVIDER NOTE - NS ED MD TWO NIGHTS YN
Medicare Wellness Visit  Plan for Preventive Care    A good way for you to stay healthy is to use preventive care.  Medicare covers many services that can help you stay healthy.* The goal of these services is to find any health problems as quickly as possible. Finding problems early can help make them easier to treat.  Your personal plan below lists the services you may need and when they are due.      Health Maintenance Summary     Shingles Vaccine (1 of 2)  Overdue - never done    Influenza Vaccine (1)  Overdue since 9/1/2023    COVID-19 Vaccine (6 - 2023-24 season)  Overdue since 9/1/2023    Traditional Medicare- Medicare Wellness Visit (Yearly)  Due since 3/1/2024    Breast Cancer Screening (Every 2 Years)  Scheduled for 6/7/2024    Depression Screening (Yearly)  Next due on 3/25/2025    Colorectal Cancer Screen- (Colonoscopy - Every 10 Years)  Next due on 3/19/2028    DTaP/Tdap/Td Vaccine (2 - Td or Tdap)  Next due on 11/30/2030    Hepatitis C Screening   Completed    Pneumococcal Vaccine 65+   Completed    Osteoporosis Screening   Completed    Meningococcal Vaccine   Aged Out    Hepatitis B Vaccine (For Physician/[facility] Discussion)   Aged Out    HPV Vaccine   Aged Out           Preventive Care for Women and Men    Heart Screenings (Cardiovascular):  Blood tests are used to check your cholesterol, lipid and triglyceride levels. High levels can increase your risk for heart disease and stroke. High levels can be treated with medications, diet and exercise. Lowering your levels can help keep your heart and blood vessels healthy.  Your provider will order these tests if they are needed.    An ultrasound is done to see if you have an abdominal aortic aneurysm (AAA).  This is an enlargement of one of the main blood vessels that delivers blood to the body.   In the [place], 9,000 deaths are caused by AAA.  You may not even know you have this problem and as many as 1 in 3 people will have a serious problem if it  is not treated.  Early diagnosis allows for more effective treatment and cure.  If you have a family history of AAA or are a male age 65-75 who has smoked, you are at higher risk of an AAA.  Your provider can order this test, if needed.    Colorectal Screening:  There are many tests that are used to check for cancer of your colon and rectum. You and your provider should discuss what test is best for you and when to have it done.  Options include:  Screening Colonoscopy: exam of the entire colon, seen through a flexible lighted tube.  Flexible Sigmoidoscopy: exam of the last third (sigmoid portion) of the colon and rectum, seen through a flexible lighted tube.  Cologuard DNA stool test: a sample of your stool is used to screen for cancer and unseen blood in your stool.  Fecal Occult Blood Test: a sample of your stool is studied to find any unseen blood    Flu Shot:  An immunization that helps to prevent influenza (the flu). You should get this every year. The best time to get the shot is in the fall.    Pneumococcal Shot:  Vaccines help prevent pneumococcal disease, which is any type of illness caused by Streptococcus pneumoniae bacteria. There are two kinds of pneumococcal vaccines available in the [place]:   Pneumococcal conjugate vaccines (PCV20 or Yghejsh35®)  Pneumococcal polysaccharide vaccine (PPSV23 or Xtezjcary34®)  For those who have never received any pneumococcal conjugate vaccine, CDC recommends PVC20 for adults 65 years or older and adults 19 through 64 years old with certain medical conditions or risk factors.   For those who have previously received PCV13, this should be followed by a dose of PPSV23.     Hepatitis B Shot:  An immunization that helps to protect people from getting Hepatitis B. Hepatitis B is a virus that spreads through contact with infected blood or body fluids. Many people with the virus do not have symptoms.  The virus can lead to serious problems, such as liver disease. Some  people are at higher risk than others. Your doctor will tell you if you need this shot.     Diabetes Screening:  A test to measure sugar (glucose) in your blood is called a fasting blood sugar. Fasting means you cannot have food or drink for at least 8 hours before the test. This test can detect diabetes long before you may notice symptoms.    Glaucoma Screening:  Glaucoma screening is performed by your eye doctor. The test measures the fluid pressure inside your eyes to determine if you have glaucoma.     Hepatitis C Screening:  A blood test to see if you have the hepatitis C virus.  Hepatitis C attacks the liver and is a major cause of chronic liver disease.  Medicare will cover a single screening for all adults born between 1945 & 1965, or high risk patients (people who have injected illegal drugs or people who have had blood transfusions).  High risk patients who continue to inject illegal drugs can be screened for Hepatitis C every year.    Smoking and Tobacco-Use Cessation Counseling:  Tobacco is the single greatest cause of disease and early death in our country today. Medication and counseling together can increase a person’s chance of quitting for good.   Medicare covers two quitting attempts per year, with four counseling sessions per attempt (eight sessions in a 12 month period)    Preventive Screening tests for Women    Screening Mammograms and Breast Exams:  An x-ray of your breasts to check for breast cancer before you or your doctor may be able to feel it.  If breast cancer is found early it can usually be treated with success.    Pelvic Exams and Pap Tests:  An exam to check for cervical and vaginal cancer. A Pap test is a lab test in which cells are taken from your cervix and sent to the lab to look for signs of cervical cancer. If cancer of the cervix is found early, chances for a cure are good. Testing can generally end at age 65, or if a woman has a hysterectomy for a benign condition. Your  provider may recommend more frequent testing if certain abnormal results are found.    Bone Mass Measurements:  A painless x-ray of your bone density to see if you are at risk for a broken bone. Bone density refers to the thickness of bones or how tightly the bone tissue is packed.    Preventive Screening tests for Men    Prostate Screening:  Should you have a prostate cancer test (PSA)?  It is up to you to decide if you want a prostate cancer test. Talk to your clinician to find out if the test is right for you.  Things for you to consider and talk about should include:  Benefits and harms of the test  Your family history  How your race/ethnicity may influence the test  If the test may impact other medical conditions you have  Your values on screenings and treatments    *Medicare pays for many preventive services to keep you healthy. For some of these services, you might have to pay a deductible, coinsurance, and / or copayment.  The amounts vary depending on the type of services you need and the kind of Medicare health plan you have.    For further details on screenings offered by Medicare please visit: https://www.medicare.gov/coverage/preventive-screening-services      Yes

## 2024-06-26 NOTE — BEHAVIORAL HEALTH ASSESSMENT NOTE - NS ED BHA MED ROS EYES
Left detailed message on machine per Dr. Chi  Typically will need to start medication months before travel   May not be effective by the time she goes on vacation  She can make an appt to discuss   Ok to do virtual       Patient advised can use maria eugenia or call the office at [phone] to schedule.        No complaints

## 2024-08-08 NOTE — PHYSICAL THERAPY INITIAL EVALUATION ADULT - DIAGNOSIS, PT EVAL
Called & spoke with spouse. Advised to expect to get a call from Dr. Sky's office on next steps. Additionally, provided office contact number.   R29.6 Recurrent falls

## 2024-08-12 NOTE — PATIENT PROFILE ADULT. - TRANSFUSION PREMEDICATION REQUIRED
8/12/2024  Social Work Discharge Planning: Pt is here for groin wounds-debridement. Hypotension. Catheter was replaced this morning. This worker met with Pt to discuss  role and transition of care/discharge planning.Pt came from [facility] and per liaison will need a precert to return. Pt plans to return if he is able. Awaiting therapy evals when Pt is able.  JULIANN and transport form (started) are in chart. Electronically signed by JUWAN Mcnally on 8/12/2024 at 2:19 PM         none

## 2024-08-29 NOTE — PATIENT PROFILE ADULT - NSPROGENARRIVEDFROM_GEN_A_NUR
PATIENT INSTRUCTIONS    Treatment:  Activities as tolerated  Range of motion as tolerated    Follow-Up:  Call or return to the clinic as needed if these symptoms worsen fail to improve as anticipated, or if new symptoms develop.        Time left: 8/29/2024 9:00 AM     Please note: 24 hour notice for cancellation of appointment is required.    You may receive a survey in the mail, or via the e-mail address that you have provided.  We would appreciate if you could fill out the survey and provide us with any feedback on your experience regarding your visit today. Thank you for allowing us to provide you with your health care needs.     Do not hesitate to call if you are experiencing severe pain, worsening or change in your pain, have symptoms of infection (fever, warmth, redness, increased drainage), or have any other problem that concerns you ~ [phone] (or [phone] after hours).    Please remember when requesting refills on pain medication that the request should be made by Thursday at the latest. [facility] is open Monday-Friday, 8am-5pm, and closed on the weekends.  No narcotic refills will be filled after hours.    Additional Educational Resources:  For additional resources regarding your symptoms, diagnosis, or further health information, please visit the Health Resources section on [email or web address] or the Online Health Resources section in [employer].       home
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
## 2024-09-23 NOTE — ED PROVIDER NOTE - CROS ED RESP ALL NEG
[facility] Call Center    Phone Message    Reason for Call: Medication Refill Request    Has the patient contacted the pharmacy for the refill? Yes   Name of medication being requested: Estradiol  Provider who prescribed the medication: Dr. Mane   Pharmacy: [place], [place]  Date medication is needed: ASAP    Action Taken: Message routed to: [facility] nurse [place]    Date of Service: 9/23/2024 Tereza Lacey                                                                     
- - -

## 2024-11-11 NOTE — PATIENT PROFILE ADULT - PROVIDER NAME
Dad called to request an extension on CL prescription because patient is starting basketball. Explained I can extend prescription if we make an appointment for patient to come back in for an eye exam. Appt made for 1/16 at 2:20pm. Dad transferred to optical to order CL.   
Dr Alejo Stuart

## 2024-11-25 NOTE — PROVIDER CONTACT NOTE (CRITICAL VALUE NOTIFICATION) - DATE AND TIME:
BCC Progress Note      2024    Farhad Balderas    :  1970    MRN:  [id number]    Referring MD: Jona Simpson MD  [facility] [address] [place] [address]      Subjective: She is doing well, no issues. Afebrile.      ECOG PS:  (1) Restricted in physically strenuous activity, ambulatory and able to do work of light nature    KPS: 80% Normal activity with effort; some signs or symptoms of disease    ROS:  As noted above, otherwise remainder of 10-point ROS negative      Physical Exam:     Vital Signs:  BP (!) 141/85   Pulse (!) 116   Temp 98.1 °F (36.7 °C) (Oral)   Resp 18   Wt 111.6 kg (246 lb)   SpO2 97%   BMI 39.11 kg/m²     Weight:    Wt Readings from Last 3 Encounters:   24 111.6 kg (246 lb)   24 110.5 kg (243 lb 9.7 oz)   24 110.7 kg (244 lb 0.8 oz)       General: Awake, alert and oriented.  HEENT: normocephalic, alopecia, PERRL, no scleral erythema or icterus, Oral mucosa moist and intact, throat clear  NECK: supple   BACK: Straight   SKIN: warm dry and intact without lesions rashes or masses  CHEST: CTA bilaterally without use of accessory muscles  CV: Normal S1 S2, RRR, no MRG  ABD: NT ND normoactive BS  EXTREMITIES: without edema, denies calf tenderness  NEURO: CN II - XII grossly intact  CATHETER: PAC    Laboratory Data:  CBC:   Recent Labs     24  0842 24  0834 24  0848   WBC 0.5* 0.5* 0.4*   HGB 8.5* 8.8* 8.4*   HCT 25.9* 26.9* 25.9*   MCV 86.6 87.5 87.2    186 184     BMP/Mag:  Recent Labs     24  0842 24  0834 24  0848    142 139   K 4.1 3.9 3.7    107 104   CO2 27 25 26   PHOS 4.0 4.5 3.4   BUN 11 8 8   CREATININE 0.8 0.7 0.7   MG  --   --  1.64*     LIVP:   Recent Labs     24  0848   AST 13*   ALT 12   BILIDIR 0.2   BILITOT 0.5   ALKPHOS 44     Coags:   Recent Labs     24  0848   PROTIME 14.0   INR 1.06       Uric Acid No results for input(s): \"LABURIC\" in the last 72  05-Nov-2017 17:58 05-Nov-2017 18:05

## 2025-01-03 NOTE — PATIENT PROFILE ADULT - ..
Medication: sacubitril-valsartan (Entresto) 49-51 MG per tablet   Last office visit date: 07/16/2024  Medication Refill Protocol Failed.  Protocol approved due to: identified sig mis match, same prescription.    
23-Aug-2019 11:41:41

## 2025-01-16 NOTE — ED ADULT NURSE NOTE - CINV DISCH EXIT CARE INSTR PROVIDE
Medication: Norco  Last office visit date: 11/22/24  Medication Refill Protocol Failed.  Failed criteria: see below. Sent to clinician to review.     Opioid Refill Protocol - 12 Month Protocol - ALWAYS forward to ordering provider [id number]/16/2025 11:38 AM   Protocol Details FORWARD TO PROVIDER - Refill requests for these medications must ALWAYS be forwarded to the ordering provider, even if all criteria are met    PDMP has been reviewed in last 24 hours    Urine/serum drug screen on file in the appropriate time frame according to Opioid Risk Tool (ORT) score    Active/up-to-date opioid agreement/consent on file    Naloxone on current med list and has other factors (see details for factors)         yes

## 2025-01-20 NOTE — BEHAVIORAL HEALTH ASSESSMENT NOTE - AFFECT CONGRUENCE
Patient here for hearing testing   She feels her left ear has gotten worse over the last few years  Last test with us was in 2021- she then obtained hearing aids from [employer]    Otoscopic Inspection:  right ear: moderate cerumen and TM partially visualized  left ear: excessive cerumen and TM could not be visualized    Flat tympanogram for the left- so further testing was deferred   Patient will make appt with ENT for cerumen management and hearing testing to follow      Congruent

## 2025-02-14 NOTE — PROGRESS NOTE ADULT - ASSESSMENT
86 YO F with a PMH of CAD s/p CABG, HFpEF, HTN, GERD, OA s/p bilateral TKR presents to the ED for five days of shortness of breath.  Patient reports increased dyspnea on exertion and at rest during that time.  Patient reports cough with some clear to white sputum, also reports that she frequently has orthopnea and has noticed her legs swelling more recently.  Patient denies sick contacts at home, stating she lives alone but her sons are currently visiting.  Denies chest pain, palpitations, nausea, vomiting, diaphoresis, dizziness, or lightheadedness.   Found to be in rapid Afib with HRs 130-140s, started on Cardizem gtt with HRs improved with 70-90s  Kellie neg x 1  Echo 8/2019: LVEF 60%, DD, mild-mod MR, mod AI, mild MS/pHTN  ECG on admit:  afib w RVR 137bpm; lateral ST depressions   BNP (17081 ->8900) -> 14,000 on this admission  Tele: afib 70-90s    ·	Acute on chronic diastolic HF, EF 55-60%, Gr III DD  ·	Atrial fibrillation with RVR  ·	B/L pleural effusions  ·	Pulmonary HTN- mild  ·	CAD Hx, s/p CABG  ·	HTN  ·	GERD  ·	Hypokalemia  ·	Hyponatremia  ·	VHD ( mild-mod MR, mod AR, mild MS)    Plan    MEDICATIONS  (STANDING):  apixaban 2.5 milliGRAM(s) Oral every 12 hours  aspirin enteric coated 81 milliGRAM(s) Oral daily  atorvastatin 20 milliGRAM(s) Oral at bedtime  Biotene Dry Mouth Oral Rinse 5 milliLiter(s) Swish and Spit daily  buDESOnide  80 MICROgram(s)/formoterol 4.5 MICROgram(s) Inhaler 2 Puff(s) Inhalation two times a day  diltiazem    Tablet 30 milliGRAM(s) Oral every 6 hours  docusate sodium 100 milliGRAM(s) Oral three times a day  FLUoxetine 40 milliGRAM(s) Oral daily  furosemide   Injectable 60 milliGRAM(s) IV Push every 12 hours  influenza   Vaccine 0.5 milliLiter(s) IntraMuscular once  lisinopril 20 milliGRAM(s) Oral daily  metoprolol tartrate 50 milliGRAM(s) Oral two times a day  pantoprazole    Tablet 40 milliGRAM(s) Oral before breakfast    diurese/fluid/salt restriction. PT/oob.  Kevin Moulton MD, FACC Reviewed nausea nutrition therapy; Educated on the importance of consuming a diet adequate in protein rich food sources; encouraged prioritizing protein foods first at meal times; recommended small, frequent nutrient dense meals. Discussed the benefits of oral nutrition supplementation; recommended having small sips in between meals to optimize protein intake. Pt receptive to diet education and made aware RD remains available upon request./teach back/(2) meets goals/outcomes/verbalization

## 2025-06-04 NOTE — ED BEHAVIORAL HEALTH ASSESSMENT NOTE - SUBSTANCE ISSUES AND PLAN (INCLUDE STANDING AND PRN MEDICATION)
Biometrics completed.    Results reviewed with a Registered Nurse; understanding of results and   educational materials was verbalized.   substances do not seem to be a factor in this case